# Patient Record
Sex: FEMALE | Race: BLACK OR AFRICAN AMERICAN | NOT HISPANIC OR LATINO | Employment: UNEMPLOYED
[De-identification: names, ages, dates, MRNs, and addresses within clinical notes are randomized per-mention and may not be internally consistent; named-entity substitution may affect disease eponyms.]

---

## 2017-01-04 ENCOUNTER — RECORDS - HEALTHEAST (OUTPATIENT)
Dept: ADMINISTRATIVE | Facility: OTHER | Age: 32
End: 2017-01-04

## 2017-01-26 ENCOUNTER — RECORDS - HEALTHEAST (OUTPATIENT)
Dept: ADMINISTRATIVE | Facility: OTHER | Age: 32
End: 2017-01-26

## 2017-02-03 ENCOUNTER — RECORDS - HEALTHEAST (OUTPATIENT)
Dept: ADMINISTRATIVE | Facility: OTHER | Age: 32
End: 2017-02-03

## 2017-02-10 ENCOUNTER — RECORDS - HEALTHEAST (OUTPATIENT)
Dept: ADMINISTRATIVE | Facility: OTHER | Age: 32
End: 2017-02-10

## 2017-02-20 ENCOUNTER — RECORDS - HEALTHEAST (OUTPATIENT)
Dept: ADMINISTRATIVE | Facility: OTHER | Age: 32
End: 2017-02-20

## 2017-04-06 ENCOUNTER — RECORDS - HEALTHEAST (OUTPATIENT)
Dept: ADMINISTRATIVE | Facility: OTHER | Age: 32
End: 2017-04-06

## 2017-11-14 ENCOUNTER — RECORDS - HEALTHEAST (OUTPATIENT)
Dept: ADMINISTRATIVE | Facility: OTHER | Age: 32
End: 2017-11-14

## 2018-03-08 ENCOUNTER — RECORDS - HEALTHEAST (OUTPATIENT)
Dept: ADMINISTRATIVE | Facility: OTHER | Age: 33
End: 2018-03-08

## 2018-03-15 ENCOUNTER — RECORDS - HEALTHEAST (OUTPATIENT)
Dept: ADMINISTRATIVE | Facility: OTHER | Age: 33
End: 2018-03-15

## 2018-03-22 ENCOUNTER — RECORDS - HEALTHEAST (OUTPATIENT)
Dept: ADMINISTRATIVE | Facility: OTHER | Age: 33
End: 2018-03-22

## 2018-04-19 ENCOUNTER — RECORDS - HEALTHEAST (OUTPATIENT)
Dept: ADMINISTRATIVE | Facility: OTHER | Age: 33
End: 2018-04-19

## 2018-05-30 ENCOUNTER — RECORDS - HEALTHEAST (OUTPATIENT)
Dept: ADMINISTRATIVE | Facility: OTHER | Age: 33
End: 2018-05-30

## 2018-08-20 ENCOUNTER — RECORDS - HEALTHEAST (OUTPATIENT)
Dept: ADMINISTRATIVE | Facility: OTHER | Age: 33
End: 2018-08-20

## 2018-09-06 ENCOUNTER — RECORDS - HEALTHEAST (OUTPATIENT)
Dept: ADMINISTRATIVE | Facility: OTHER | Age: 33
End: 2018-09-06

## 2018-09-20 ENCOUNTER — RECORDS - HEALTHEAST (OUTPATIENT)
Dept: ADMINISTRATIVE | Facility: OTHER | Age: 33
End: 2018-09-20

## 2018-09-26 ENCOUNTER — RECORDS - HEALTHEAST (OUTPATIENT)
Dept: ADMINISTRATIVE | Facility: OTHER | Age: 33
End: 2018-09-26

## 2018-10-11 ENCOUNTER — RECORDS - HEALTHEAST (OUTPATIENT)
Dept: ADMINISTRATIVE | Facility: OTHER | Age: 33
End: 2018-10-11

## 2018-10-18 ENCOUNTER — RECORDS - HEALTHEAST (OUTPATIENT)
Dept: ADMINISTRATIVE | Facility: OTHER | Age: 33
End: 2018-10-18

## 2018-10-22 ENCOUNTER — RECORDS - HEALTHEAST (OUTPATIENT)
Dept: ADMINISTRATIVE | Facility: OTHER | Age: 33
End: 2018-10-22

## 2018-10-26 ENCOUNTER — RECORDS - HEALTHEAST (OUTPATIENT)
Dept: ADMINISTRATIVE | Facility: OTHER | Age: 33
End: 2018-10-26

## 2018-12-04 ENCOUNTER — RECORDS - HEALTHEAST (OUTPATIENT)
Dept: ADMINISTRATIVE | Facility: OTHER | Age: 33
End: 2018-12-04

## 2019-03-06 ENCOUNTER — RECORDS - HEALTHEAST (OUTPATIENT)
Dept: ADMINISTRATIVE | Facility: OTHER | Age: 34
End: 2019-03-06

## 2019-03-20 ENCOUNTER — RECORDS - HEALTHEAST (OUTPATIENT)
Dept: ADMINISTRATIVE | Facility: OTHER | Age: 34
End: 2019-03-20

## 2019-07-22 ENCOUNTER — COMMUNICATION - HEALTHEAST (OUTPATIENT)
Dept: SCHEDULING | Facility: CLINIC | Age: 34
End: 2019-07-22

## 2019-07-22 ENCOUNTER — OFFICE VISIT - HEALTHEAST (OUTPATIENT)
Dept: FAMILY MEDICINE | Facility: CLINIC | Age: 34
End: 2019-07-22

## 2019-07-22 DIAGNOSIS — N95.2 ATROPHIC VAGINITIS: ICD-10-CM

## 2019-07-22 DIAGNOSIS — N89.8 VAGINAL ITCHING: ICD-10-CM

## 2019-07-22 DIAGNOSIS — R05.8 ALLERGIC COUGH: ICD-10-CM

## 2019-07-22 DIAGNOSIS — R05.9 COUGH: ICD-10-CM

## 2019-07-22 DIAGNOSIS — R10.9 RIGHT FLANK PAIN: ICD-10-CM

## 2019-07-22 LAB
ALBUMIN UR-MCNC: NEGATIVE MG/DL
APPEARANCE UR: CLEAR
BACTERIA #/AREA URNS HPF: ABNORMAL HPF
BILIRUB UR QL STRIP: NEGATIVE
CLUE CELLS: NORMAL
COLOR UR AUTO: YELLOW
GLUCOSE UR STRIP-MCNC: NEGATIVE MG/DL
HGB UR QL STRIP: ABNORMAL
KETONES UR STRIP-MCNC: NEGATIVE MG/DL
LEUKOCYTE ESTERASE UR QL STRIP: ABNORMAL
NITRATE UR QL: NEGATIVE
PH UR STRIP: 6.5 [PH] (ref 5–8)
RBC #/AREA URNS AUTO: ABNORMAL HPF
SP GR UR STRIP: 1.01 (ref 1–1.03)
SQUAMOUS #/AREA URNS AUTO: ABNORMAL LPF
TRICHOMONAS, WET PREP: NORMAL
UROBILINOGEN UR STRIP-ACNC: ABNORMAL
WBC #/AREA URNS AUTO: ABNORMAL HPF
YEAST, WET PREP: NORMAL

## 2019-07-22 ASSESSMENT — MIFFLIN-ST. JEOR: SCORE: 1616.68

## 2019-07-23 ENCOUNTER — HOSPITAL ENCOUNTER (OUTPATIENT)
Dept: ULTRASOUND IMAGING | Facility: HOSPITAL | Age: 34
Discharge: HOME | End: 2019-07-23

## 2019-07-23 ENCOUNTER — COMMUNICATION - HEALTHEAST (OUTPATIENT)
Dept: FAMILY MEDICINE | Facility: CLINIC | Age: 34
End: 2019-07-23

## 2019-07-23 DIAGNOSIS — A49.1 STREPTOCOCCUS INFECTION, GROUP B: ICD-10-CM

## 2019-07-23 DIAGNOSIS — R10.9 RIGHT FLANK PAIN: ICD-10-CM

## 2019-07-23 LAB — BACTERIA SPEC CULT: ABNORMAL

## 2019-08-02 ENCOUNTER — COMMUNICATION - HEALTHEAST (OUTPATIENT)
Dept: FAMILY MEDICINE | Facility: CLINIC | Age: 34
End: 2019-08-02

## 2019-08-08 ENCOUNTER — COMMUNICATION - HEALTHEAST (OUTPATIENT)
Dept: FAMILY MEDICINE | Facility: CLINIC | Age: 34
End: 2019-08-08

## 2019-09-27 ENCOUNTER — OFFICE VISIT - HEALTHEAST (OUTPATIENT)
Dept: FAMILY MEDICINE | Facility: CLINIC | Age: 34
End: 2019-09-27

## 2019-09-27 DIAGNOSIS — R05.9 COUGH: ICD-10-CM

## 2019-09-27 DIAGNOSIS — G44.209 TENSION-TYPE HEADACHE, NOT INTRACTABLE, UNSPECIFIED CHRONICITY PATTERN: ICD-10-CM

## 2019-09-27 DIAGNOSIS — R06.2 WHEEZING: ICD-10-CM

## 2019-09-27 DIAGNOSIS — J30.9 ALLERGIC RHINITIS, UNSPECIFIED SEASONALITY, UNSPECIFIED TRIGGER: ICD-10-CM

## 2019-09-27 ASSESSMENT — MIFFLIN-ST. JEOR: SCORE: 1575.86

## 2019-11-03 ENCOUNTER — COMMUNICATION - HEALTHEAST (OUTPATIENT)
Dept: FAMILY MEDICINE | Facility: CLINIC | Age: 34
End: 2019-11-03

## 2019-11-03 DIAGNOSIS — G44.209 TENSION-TYPE HEADACHE, NOT INTRACTABLE, UNSPECIFIED CHRONICITY PATTERN: ICD-10-CM

## 2020-01-15 ENCOUNTER — COMMUNICATION - HEALTHEAST (OUTPATIENT)
Dept: FAMILY MEDICINE | Facility: CLINIC | Age: 35
End: 2020-01-15

## 2020-01-16 ENCOUNTER — OFFICE VISIT - HEALTHEAST (OUTPATIENT)
Dept: FAMILY MEDICINE | Facility: CLINIC | Age: 35
End: 2020-01-16

## 2020-01-16 DIAGNOSIS — N92.6 MISSED MENSES: ICD-10-CM

## 2020-01-16 DIAGNOSIS — Z23 NEED FOR IMMUNIZATION AGAINST INFLUENZA: ICD-10-CM

## 2020-01-16 DIAGNOSIS — Z32.01 PREGNANCY TEST POSITIVE: ICD-10-CM

## 2020-01-16 DIAGNOSIS — O20.0 THREATENED ABORTION IN EARLY PREGNANCY: ICD-10-CM

## 2020-01-16 DIAGNOSIS — O46.8X1 SUBCHORIONIC HEMATOMA IN FIRST TRIMESTER, SINGLE OR UNSPECIFIED FETUS: ICD-10-CM

## 2020-01-16 DIAGNOSIS — O41.8X10 SUBCHORIONIC HEMATOMA IN FIRST TRIMESTER, SINGLE OR UNSPECIFIED FETUS: ICD-10-CM

## 2020-01-16 LAB
AMPHETAMINES UR QL SCN: NORMAL
BARBITURATES UR QL: NORMAL
BASOPHILS # BLD AUTO: 0 THOU/UL (ref 0–0.2)
BASOPHILS NFR BLD AUTO: 1 % (ref 0–2)
BENZODIAZ UR QL: NORMAL
CANNABINOIDS UR QL SCN: NORMAL
COCAINE UR QL: NORMAL
CREAT UR-MCNC: 180.7 MG/DL
EOSINOPHIL # BLD AUTO: 0.3 THOU/UL (ref 0–0.4)
EOSINOPHIL NFR BLD AUTO: 5 % (ref 0–6)
ERYTHROCYTE [DISTWIDTH] IN BLOOD BY AUTOMATED COUNT: 13.7 % (ref 11–14.5)
HCG UR QL: POSITIVE
HCT VFR BLD AUTO: 34.7 % (ref 35–47)
HGB BLD-MCNC: 11.2 G/DL (ref 12–16)
HIV 1+2 AB+HIV1 P24 AG SERPL QL IA: NEGATIVE
LYMPHOCYTES # BLD AUTO: 0.9 THOU/UL (ref 0.8–4.4)
LYMPHOCYTES NFR BLD AUTO: 17 % (ref 20–40)
MCH RBC QN AUTO: 28.7 PG (ref 27–34)
MCHC RBC AUTO-ENTMCNC: 32.3 G/DL (ref 32–36)
MCV RBC AUTO: 89 FL (ref 80–100)
MONOCYTES # BLD AUTO: 0.5 THOU/UL (ref 0–0.9)
MONOCYTES NFR BLD AUTO: 9 % (ref 2–10)
NEUTROPHILS # BLD AUTO: 3.6 THOU/UL (ref 2–7.7)
NEUTROPHILS NFR BLD AUTO: 67 % (ref 50–70)
OPIATES UR QL SCN: NORMAL
OXYCODONE UR QL: NORMAL
PCP UR QL SCN: NORMAL
PLATELET # BLD AUTO: 190 THOU/UL (ref 140–440)
PMV BLD AUTO: 12.5 FL (ref 8.5–12.5)
RBC # BLD AUTO: 3.9 MILL/UL (ref 3.8–5.4)
WBC: 5.4 THOU/UL (ref 4–11)

## 2020-01-16 ASSESSMENT — MIFFLIN-ST. JEOR: SCORE: 1576.99

## 2020-01-17 LAB
ABO/RH(D): NORMAL
ABORH REPEAT: NORMAL
ANTIBODY SCREEN: NEGATIVE
C TRACH DNA SPEC QL PROBE+SIG AMP: NEGATIVE
HBV SURFACE AG SERPL QL IA: NEGATIVE
LEAD BLDV-MCNC: <2 UG/DL (ref 0–4.9)
N GONORRHOEA DNA SPEC QL NAA+PROBE: NEGATIVE
RUBV IGG SERPL QL IA: POSITIVE
T PALLIDUM AB SER QL: NEGATIVE

## 2020-01-18 LAB
COLLECTION METHOD: NORMAL
LEAD BLD-MCNC: NORMAL UG/DL

## 2020-02-04 ENCOUNTER — COMMUNICATION - HEALTHEAST (OUTPATIENT)
Dept: SCHEDULING | Facility: CLINIC | Age: 35
End: 2020-02-04

## 2020-02-05 ENCOUNTER — OFFICE VISIT - HEALTHEAST (OUTPATIENT)
Dept: FAMILY MEDICINE | Facility: CLINIC | Age: 35
End: 2020-02-05

## 2020-02-05 ENCOUNTER — COMMUNICATION - HEALTHEAST (OUTPATIENT)
Dept: FAMILY MEDICINE | Facility: CLINIC | Age: 35
End: 2020-02-05

## 2020-02-05 DIAGNOSIS — O21.0 HYPEREMESIS GRAVIDARUM: ICD-10-CM

## 2020-02-05 DIAGNOSIS — R05.9 COUGH: ICD-10-CM

## 2020-02-05 DIAGNOSIS — Z3A.20 20 WEEKS GESTATION OF PREGNANCY: ICD-10-CM

## 2020-02-05 DIAGNOSIS — G47.00 INSOMNIA, UNSPECIFIED TYPE: ICD-10-CM

## 2020-02-05 LAB
DEPRECATED S PYO AG THROAT QL EIA: NORMAL
FLUAV AG SPEC QL IA: NORMAL
FLUBV AG SPEC QL IA: NORMAL

## 2020-02-05 ASSESSMENT — MIFFLIN-ST. JEOR: SCORE: 1559.97

## 2020-02-06 ENCOUNTER — PRENATAL OFFICE VISIT - HEALTHEAST (OUTPATIENT)
Dept: FAMILY MEDICINE | Facility: CLINIC | Age: 35
End: 2020-02-06

## 2020-02-06 DIAGNOSIS — Z34.90 PREGNANCY, UNSPECIFIED GESTATIONAL AGE: ICD-10-CM

## 2020-02-06 DIAGNOSIS — Z23 NEED FOR IMMUNIZATION AGAINST INFLUENZA: ICD-10-CM

## 2020-02-06 DIAGNOSIS — J98.01 BRONCHOSPASM: ICD-10-CM

## 2020-02-06 DIAGNOSIS — K21.9 GASTROESOPHAGEAL REFLUX DISEASE WITHOUT ESOPHAGITIS: ICD-10-CM

## 2020-02-06 LAB — GROUP A STREP BY PCR: NORMAL

## 2020-02-06 RX ORDER — ALBUTEROL SULFATE 90 UG/1
2 AEROSOL, METERED RESPIRATORY (INHALATION) EVERY 6 HOURS PRN
Qty: 18 G | Refills: 11 | Status: ON HOLD | OUTPATIENT
Start: 2020-02-06 | End: 2021-10-31

## 2020-02-06 ASSESSMENT — MIFFLIN-ST. JEOR: SCORE: 1559.97

## 2020-02-07 ENCOUNTER — HOSPITAL ENCOUNTER (OUTPATIENT)
Dept: ULTRASOUND IMAGING | Facility: HOSPITAL | Age: 35
Discharge: HOME | End: 2020-02-07
Attending: FAMILY MEDICINE

## 2020-02-07 DIAGNOSIS — Z3A.20 20 WEEKS GESTATION OF PREGNANCY: ICD-10-CM

## 2020-02-10 ENCOUNTER — COMMUNICATION - HEALTHEAST (OUTPATIENT)
Dept: FAMILY MEDICINE | Facility: CLINIC | Age: 35
End: 2020-02-10

## 2020-02-17 ENCOUNTER — RECORDS - HEALTHEAST (OUTPATIENT)
Dept: ADMINISTRATIVE | Facility: OTHER | Age: 35
End: 2020-02-17

## 2020-03-02 ENCOUNTER — RECORDS - HEALTHEAST (OUTPATIENT)
Dept: ADMINISTRATIVE | Facility: OTHER | Age: 35
End: 2020-03-02

## 2020-03-05 ENCOUNTER — RECORDS - HEALTHEAST (OUTPATIENT)
Dept: ADMINISTRATIVE | Facility: OTHER | Age: 35
End: 2020-03-05

## 2020-03-10 ENCOUNTER — COMMUNICATION - HEALTHEAST (OUTPATIENT)
Dept: SCHEDULING | Facility: CLINIC | Age: 35
End: 2020-03-10

## 2020-03-16 ENCOUNTER — PRENATAL OFFICE VISIT - HEALTHEAST (OUTPATIENT)
Dept: FAMILY MEDICINE | Facility: CLINIC | Age: 35
End: 2020-03-16

## 2020-03-16 DIAGNOSIS — O21.0 HYPEREMESIS GRAVIDARUM: ICD-10-CM

## 2020-03-16 DIAGNOSIS — O22.02 OBSTETRIC VARICOSE VEINS IN SECOND TRIMESTER: ICD-10-CM

## 2020-03-16 DIAGNOSIS — G47.00 INSOMNIA, UNSPECIFIED TYPE: ICD-10-CM

## 2020-03-16 DIAGNOSIS — Z34.90 PREGNANCY, UNSPECIFIED GESTATIONAL AGE: ICD-10-CM

## 2020-03-16 ASSESSMENT — MIFFLIN-ST. JEOR: SCORE: 1571.31

## 2020-03-23 ENCOUNTER — COMMUNICATION - HEALTHEAST (OUTPATIENT)
Dept: FAMILY MEDICINE | Facility: CLINIC | Age: 35
End: 2020-03-23

## 2020-03-23 DIAGNOSIS — Z34.90 PREGNANCY, UNSPECIFIED GESTATIONAL AGE: ICD-10-CM

## 2020-04-03 ENCOUNTER — COMMUNICATION - HEALTHEAST (OUTPATIENT)
Dept: FAMILY MEDICINE | Facility: CLINIC | Age: 35
End: 2020-04-03

## 2020-04-13 ENCOUNTER — AMBULATORY - HEALTHEAST (OUTPATIENT)
Dept: FAMILY MEDICINE | Facility: CLINIC | Age: 35
End: 2020-04-13

## 2020-04-13 ENCOUNTER — COMMUNICATION - HEALTHEAST (OUTPATIENT)
Dept: FAMILY MEDICINE | Facility: CLINIC | Age: 35
End: 2020-04-13

## 2020-04-13 DIAGNOSIS — Z34.90 PREGNANCY, UNSPECIFIED GESTATIONAL AGE: ICD-10-CM

## 2020-04-14 ENCOUNTER — AMBULATORY - HEALTHEAST (OUTPATIENT)
Dept: LAB | Facility: CLINIC | Age: 35
End: 2020-04-14

## 2020-04-14 ENCOUNTER — PRENATAL OFFICE VISIT - HEALTHEAST (OUTPATIENT)
Dept: FAMILY MEDICINE | Facility: CLINIC | Age: 35
End: 2020-04-14

## 2020-04-14 DIAGNOSIS — O21.0 HYPEREMESIS GRAVIDARUM: ICD-10-CM

## 2020-04-14 DIAGNOSIS — K59.01 SLOW TRANSIT CONSTIPATION: ICD-10-CM

## 2020-04-14 DIAGNOSIS — Z34.90 PREGNANCY, UNSPECIFIED GESTATIONAL AGE: ICD-10-CM

## 2020-04-14 DIAGNOSIS — H10.13 ALLERGIC CONJUNCTIVITIS, BILATERAL: ICD-10-CM

## 2020-04-14 DIAGNOSIS — D50.8 IRON DEFICIENCY ANEMIA SECONDARY TO INADEQUATE DIETARY IRON INTAKE: ICD-10-CM

## 2020-04-14 DIAGNOSIS — G47.00 INSOMNIA, UNSPECIFIED TYPE: ICD-10-CM

## 2020-04-14 LAB
ALBUMIN UR-MCNC: NEGATIVE MG/DL
APPEARANCE UR: ABNORMAL
BACTERIA #/AREA URNS HPF: ABNORMAL HPF
BILIRUB UR QL STRIP: NEGATIVE
COLOR UR AUTO: YELLOW
FASTING STATUS PATIENT QL REPORTED: NO
GLUCOSE 1H P 50 G GLC PO SERPL-MCNC: 124 MG/DL (ref 70–139)
GLUCOSE UR STRIP-MCNC: NEGATIVE MG/DL
HGB BLD-MCNC: 9.5 G/DL (ref 12–16)
HGB UR QL STRIP: NEGATIVE
KETONES UR STRIP-MCNC: ABNORMAL MG/DL
LEUKOCYTE ESTERASE UR QL STRIP: ABNORMAL
MUCOUS THREADS #/AREA URNS LPF: ABNORMAL LPF
NITRATE UR QL: NEGATIVE
PH UR STRIP: 7 [PH] (ref 5–8)
RBC #/AREA URNS AUTO: ABNORMAL HPF
SP GR UR STRIP: 1.01 (ref 1–1.03)
SQUAMOUS #/AREA URNS AUTO: >100 LPF
UROBILINOGEN UR STRIP-ACNC: ABNORMAL
WBC #/AREA URNS AUTO: ABNORMAL HPF

## 2020-04-14 ASSESSMENT — MIFFLIN-ST. JEOR: SCORE: 1576.98

## 2020-04-15 LAB — BACTERIA SPEC CULT: NORMAL

## 2020-04-20 ENCOUNTER — RECORDS - HEALTHEAST (OUTPATIENT)
Dept: ADMINISTRATIVE | Facility: OTHER | Age: 35
End: 2020-04-20

## 2020-04-28 ENCOUNTER — PRENATAL OFFICE VISIT - HEALTHEAST (OUTPATIENT)
Dept: FAMILY MEDICINE | Facility: CLINIC | Age: 35
End: 2020-04-28

## 2020-04-28 DIAGNOSIS — J30.2 SEASONAL ALLERGIC RHINITIS, UNSPECIFIED TRIGGER: ICD-10-CM

## 2020-04-28 DIAGNOSIS — O09.893 SUPERVISION OF OTHER HIGH RISK PREGNANCIES, THIRD TRIMESTER: ICD-10-CM

## 2020-04-28 DIAGNOSIS — O22.00 VARICOSE VEINS DURING PREGNANCY, ANTEPARTUM: ICD-10-CM

## 2020-04-28 ASSESSMENT — MIFFLIN-ST. JEOR: SCORE: 1589.46

## 2020-05-08 ENCOUNTER — COMMUNICATION - HEALTHEAST (OUTPATIENT)
Dept: FAMILY MEDICINE | Facility: CLINIC | Age: 35
End: 2020-05-08

## 2020-05-15 ENCOUNTER — PRENATAL OFFICE VISIT - HEALTHEAST (OUTPATIENT)
Dept: FAMILY MEDICINE | Facility: CLINIC | Age: 35
End: 2020-05-15

## 2020-05-15 DIAGNOSIS — O09.893 SUPERVISION OF OTHER HIGH RISK PREGNANCIES, THIRD TRIMESTER: ICD-10-CM

## 2020-05-15 DIAGNOSIS — O28.3 ABNORMAL FETAL ULTRASOUND: ICD-10-CM

## 2020-05-15 DIAGNOSIS — K21.9 GASTROESOPHAGEAL REFLUX DISEASE WITHOUT ESOPHAGITIS: ICD-10-CM

## 2020-05-15 DIAGNOSIS — D50.8 IRON DEFICIENCY ANEMIA SECONDARY TO INADEQUATE DIETARY IRON INTAKE: ICD-10-CM

## 2020-05-15 LAB
FERRITIN SERPL-MCNC: <2 NG/ML (ref 10–130)
HGB BLD-MCNC: 8.9 G/DL (ref 12–16)
IRON SERPL-MCNC: 30 UG/DL (ref 42–175)

## 2020-05-15 RX ORDER — CALCIUM CARBONATE 500 MG/1
1 TABLET, CHEWABLE ORAL DAILY
Qty: 90 TABLET | Refills: 3 | Status: SHIPPED | OUTPATIENT
Start: 2020-05-15 | End: 2023-04-28

## 2020-05-15 ASSESSMENT — MIFFLIN-ST. JEOR: SCORE: 1603.07

## 2020-05-16 LAB
ALLERGIC TO PENICILLIN: NO
GP B STREP DNA SPEC QL NAA+PROBE: NEGATIVE

## 2020-05-19 ENCOUNTER — AMBULATORY - HEALTHEAST (OUTPATIENT)
Dept: FAMILY MEDICINE | Facility: CLINIC | Age: 35
End: 2020-05-19

## 2020-05-19 DIAGNOSIS — D50.8 IRON DEFICIENCY ANEMIA SECONDARY TO INADEQUATE DIETARY IRON INTAKE: ICD-10-CM

## 2020-05-19 DIAGNOSIS — Z34.90 PREGNANCY, UNSPECIFIED GESTATIONAL AGE: ICD-10-CM

## 2020-05-19 DIAGNOSIS — O21.0 HYPEREMESIS GRAVIDARUM: ICD-10-CM

## 2020-05-21 ENCOUNTER — COMMUNICATION - HEALTHEAST (OUTPATIENT)
Dept: FAMILY MEDICINE | Facility: CLINIC | Age: 35
End: 2020-05-21

## 2020-05-26 ENCOUNTER — OFFICE VISIT - HEALTHEAST (OUTPATIENT)
Dept: FAMILY MEDICINE | Facility: CLINIC | Age: 35
End: 2020-05-26

## 2020-05-26 DIAGNOSIS — O09.893 SUPERVISION OF OTHER HIGH RISK PREGNANCIES, THIRD TRIMESTER: ICD-10-CM

## 2020-05-26 DIAGNOSIS — D50.8 IRON DEFICIENCY ANEMIA SECONDARY TO INADEQUATE DIETARY IRON INTAKE: ICD-10-CM

## 2020-05-26 DIAGNOSIS — Z34.90 PREGNANCY, UNSPECIFIED GESTATIONAL AGE: ICD-10-CM

## 2020-05-27 ENCOUNTER — RECORDS - HEALTHEAST (OUTPATIENT)
Dept: ADMINISTRATIVE | Facility: OTHER | Age: 35
End: 2020-05-27

## 2020-05-27 ENCOUNTER — COMMUNICATION - HEALTHEAST (OUTPATIENT)
Dept: FAMILY MEDICINE | Facility: CLINIC | Age: 35
End: 2020-05-27

## 2020-05-29 ENCOUNTER — INFUSION - HEALTHEAST (OUTPATIENT)
Dept: INFUSION THERAPY | Facility: HOSPITAL | Age: 35
End: 2020-05-29

## 2020-05-29 DIAGNOSIS — D50.8 IRON DEFICIENCY ANEMIA SECONDARY TO INADEQUATE DIETARY IRON INTAKE: ICD-10-CM

## 2020-05-29 DIAGNOSIS — Z34.90 PREGNANCY, UNSPECIFIED GESTATIONAL AGE: ICD-10-CM

## 2020-05-29 DIAGNOSIS — O21.0 HYPEREMESIS GRAVIDARUM: ICD-10-CM

## 2020-06-01 ENCOUNTER — INFUSION - HEALTHEAST (OUTPATIENT)
Dept: INFUSION THERAPY | Facility: HOSPITAL | Age: 35
End: 2020-06-01

## 2020-06-01 DIAGNOSIS — O21.0 HYPEREMESIS GRAVIDARUM: ICD-10-CM

## 2020-06-01 DIAGNOSIS — D50.8 IRON DEFICIENCY ANEMIA SECONDARY TO INADEQUATE DIETARY IRON INTAKE: ICD-10-CM

## 2020-06-01 DIAGNOSIS — Z34.90 PREGNANCY, UNSPECIFIED GESTATIONAL AGE: ICD-10-CM

## 2020-06-03 ENCOUNTER — INFUSION - HEALTHEAST (OUTPATIENT)
Dept: INFUSION THERAPY | Facility: HOSPITAL | Age: 35
End: 2020-06-03

## 2020-06-03 ENCOUNTER — PRENATAL OFFICE VISIT - HEALTHEAST (OUTPATIENT)
Dept: FAMILY MEDICINE | Facility: CLINIC | Age: 35
End: 2020-06-03

## 2020-06-03 DIAGNOSIS — Z34.90 PREGNANCY, UNSPECIFIED GESTATIONAL AGE: ICD-10-CM

## 2020-06-03 DIAGNOSIS — O21.0 HYPEREMESIS GRAVIDARUM: ICD-10-CM

## 2020-06-03 DIAGNOSIS — O09.893 SUPERVISION OF OTHER HIGH RISK PREGNANCIES, THIRD TRIMESTER: ICD-10-CM

## 2020-06-03 DIAGNOSIS — D50.8 IRON DEFICIENCY ANEMIA SECONDARY TO INADEQUATE DIETARY IRON INTAKE: ICD-10-CM

## 2020-06-03 ASSESSMENT — MIFFLIN-ST. JEOR: SCORE: 1591.73

## 2020-06-05 ENCOUNTER — INFUSION - HEALTHEAST (OUTPATIENT)
Dept: INFUSION THERAPY | Facility: HOSPITAL | Age: 35
End: 2020-06-05

## 2020-06-05 DIAGNOSIS — O21.0 HYPEREMESIS GRAVIDARUM: ICD-10-CM

## 2020-06-05 DIAGNOSIS — Z34.90 PREGNANCY, UNSPECIFIED GESTATIONAL AGE: ICD-10-CM

## 2020-06-05 DIAGNOSIS — D50.8 IRON DEFICIENCY ANEMIA SECONDARY TO INADEQUATE DIETARY IRON INTAKE: ICD-10-CM

## 2020-06-08 ENCOUNTER — COMMUNICATION - HEALTHEAST (OUTPATIENT)
Dept: FAMILY MEDICINE | Facility: CLINIC | Age: 35
End: 2020-06-08

## 2020-06-09 ENCOUNTER — INFUSION - HEALTHEAST (OUTPATIENT)
Dept: INFUSION THERAPY | Facility: HOSPITAL | Age: 35
End: 2020-06-09

## 2020-06-09 ENCOUNTER — COMMUNICATION - HEALTHEAST (OUTPATIENT)
Dept: SCHEDULING | Facility: CLINIC | Age: 35
End: 2020-06-09

## 2020-06-09 DIAGNOSIS — Z34.90 PREGNANCY, UNSPECIFIED GESTATIONAL AGE: ICD-10-CM

## 2020-06-09 DIAGNOSIS — D50.8 IRON DEFICIENCY ANEMIA SECONDARY TO INADEQUATE DIETARY IRON INTAKE: ICD-10-CM

## 2020-06-09 DIAGNOSIS — O21.0 HYPEREMESIS GRAVIDARUM: ICD-10-CM

## 2020-06-10 ENCOUNTER — PRENATAL OFFICE VISIT - HEALTHEAST (OUTPATIENT)
Dept: FAMILY MEDICINE | Facility: CLINIC | Age: 35
End: 2020-06-10

## 2020-06-10 DIAGNOSIS — O09.893 SUPERVISION OF OTHER HIGH RISK PREGNANCIES, THIRD TRIMESTER: ICD-10-CM

## 2020-06-10 DIAGNOSIS — D50.8 IRON DEFICIENCY ANEMIA SECONDARY TO INADEQUATE DIETARY IRON INTAKE: ICD-10-CM

## 2020-06-10 DIAGNOSIS — Z34.90 PREGNANCY, UNSPECIFIED GESTATIONAL AGE: ICD-10-CM

## 2020-06-19 ENCOUNTER — COMMUNICATION - HEALTHEAST (OUTPATIENT)
Dept: FAMILY MEDICINE | Facility: CLINIC | Age: 35
End: 2020-06-19

## 2020-06-19 ENCOUNTER — PRENATAL OFFICE VISIT - HEALTHEAST (OUTPATIENT)
Dept: FAMILY MEDICINE | Facility: CLINIC | Age: 35
End: 2020-06-19

## 2020-06-19 DIAGNOSIS — O09.893 SUPERVISION OF OTHER HIGH RISK PREGNANCIES, THIRD TRIMESTER: ICD-10-CM

## 2020-06-19 ASSESSMENT — MIFFLIN-ST. JEOR: SCORE: 1582.65

## 2020-06-24 ENCOUNTER — COMMUNICATION - HEALTHEAST (OUTPATIENT)
Dept: FAMILY MEDICINE | Facility: CLINIC | Age: 35
End: 2020-06-24

## 2020-06-25 ENCOUNTER — PRENATAL OFFICE VISIT - HEALTHEAST (OUTPATIENT)
Dept: FAMILY MEDICINE | Facility: CLINIC | Age: 35
End: 2020-06-25

## 2020-06-25 ENCOUNTER — HOSPITAL ENCOUNTER (OUTPATIENT)
Dept: OBGYN | Facility: HOSPITAL | Age: 35
Discharge: HOME | End: 2020-06-25
Attending: FAMILY MEDICINE | Admitting: FAMILY MEDICINE

## 2020-06-25 ENCOUNTER — HOSPITAL ENCOUNTER (OUTPATIENT)
Dept: ULTRASOUND IMAGING | Facility: HOSPITAL | Age: 35
Discharge: HOME | End: 2020-06-25
Attending: FAMILY MEDICINE

## 2020-06-25 ENCOUNTER — COMMUNICATION - HEALTHEAST (OUTPATIENT)
Dept: FAMILY MEDICINE | Facility: CLINIC | Age: 35
End: 2020-06-25

## 2020-06-25 DIAGNOSIS — O48.0 POST-TERM PREGNANCY, 40-42 WEEKS OF GESTATION: ICD-10-CM

## 2020-06-25 DIAGNOSIS — O09.893 SUPERVISION OF OTHER HIGH RISK PREGNANCIES, THIRD TRIMESTER: ICD-10-CM

## 2020-06-25 ASSESSMENT — MIFFLIN-ST. JEOR
SCORE: 1600.8

## 2020-08-03 ENCOUNTER — COMMUNICATION - HEALTHEAST (OUTPATIENT)
Dept: SCHEDULING | Facility: CLINIC | Age: 35
End: 2020-08-03

## 2020-08-03 DIAGNOSIS — R05.9 COUGH: ICD-10-CM

## 2020-08-03 DIAGNOSIS — Z20.822 EXPOSURE TO COVID-19 VIRUS: ICD-10-CM

## 2020-08-17 ENCOUNTER — COMMUNICATION - HEALTHEAST (OUTPATIENT)
Dept: FAMILY MEDICINE | Facility: CLINIC | Age: 35
End: 2020-08-17

## 2020-10-19 ENCOUNTER — COMMUNICATION - HEALTHEAST (OUTPATIENT)
Dept: FAMILY MEDICINE | Facility: CLINIC | Age: 35
End: 2020-10-19

## 2020-10-19 DIAGNOSIS — J30.9 ALLERGIC RHINITIS, UNSPECIFIED SEASONALITY, UNSPECIFIED TRIGGER: ICD-10-CM

## 2020-10-28 ENCOUNTER — OFFICE VISIT - HEALTHEAST (OUTPATIENT)
Dept: FAMILY MEDICINE | Facility: CLINIC | Age: 35
End: 2020-10-28

## 2020-10-28 DIAGNOSIS — N91.2 AMENORRHEA: ICD-10-CM

## 2020-10-28 DIAGNOSIS — M25.562 PAIN IN BOTH KNEES, UNSPECIFIED CHRONICITY: ICD-10-CM

## 2020-10-28 DIAGNOSIS — H10.13 ALLERGIC CONJUNCTIVITIS, BILATERAL: ICD-10-CM

## 2020-10-28 DIAGNOSIS — M25.561 PAIN IN BOTH KNEES, UNSPECIFIED CHRONICITY: ICD-10-CM

## 2020-10-28 LAB
ERYTHROCYTE [DISTWIDTH] IN BLOOD BY AUTOMATED COUNT: 12 % (ref 11–14.5)
FSH SERPL-ACNC: <3 MIU/ML
HCG UR QL: NEGATIVE
HCT VFR BLD AUTO: 39.2 % (ref 35–47)
HGB BLD-MCNC: 13.1 G/DL (ref 12–16)
MCH RBC QN AUTO: 29.9 PG (ref 27–34)
MCHC RBC AUTO-ENTMCNC: 33.5 G/DL (ref 32–36)
MCV RBC AUTO: 89 FL (ref 80–100)
PLATELET # BLD AUTO: 183 THOU/UL (ref 140–440)
PMV BLD AUTO: 9.1 FL (ref 7–10)
PROLACTIN SERPL-MCNC: 25.2 NG/ML (ref 0–20)
RBC # BLD AUTO: 4.39 MILL/UL (ref 3.8–5.4)
T4 FREE SERPL-MCNC: 0.9 NG/DL (ref 0.7–1.8)
TSH SERPL DL<=0.005 MIU/L-ACNC: 1.75 UIU/ML (ref 0.3–5)
WBC: 5.5 THOU/UL (ref 4–11)

## 2020-10-28 RX ORDER — CARBOXYMETHYLCELLULOSE SODIUM 10 MG/ML
GEL OPHTHALMIC
Qty: 30 ML | Refills: 12 | Status: SHIPPED | COMMUNITY
Start: 2020-10-28 | End: 2022-03-03

## 2020-10-28 ASSESSMENT — MIFFLIN-ST. JEOR: SCORE: 1648.43

## 2020-11-11 ENCOUNTER — COMMUNICATION - HEALTHEAST (OUTPATIENT)
Dept: FAMILY MEDICINE | Facility: CLINIC | Age: 35
End: 2020-11-11

## 2020-11-11 DIAGNOSIS — J30.9 ALLERGIC RHINITIS, UNSPECIFIED SEASONALITY, UNSPECIFIED TRIGGER: ICD-10-CM

## 2020-11-16 RX ORDER — CETIRIZINE HYDROCHLORIDE 5 MG/1
TABLET ORAL
Qty: 30 TABLET | Refills: 11 | Status: SHIPPED | OUTPATIENT
Start: 2020-11-16 | End: 2021-12-03

## 2021-04-29 ENCOUNTER — COMMUNICATION - HEALTHEAST (OUTPATIENT)
Dept: FAMILY MEDICINE | Facility: CLINIC | Age: 36
End: 2021-04-29

## 2021-04-29 ENCOUNTER — AMBULATORY - HEALTHEAST (OUTPATIENT)
Dept: LAB | Facility: CLINIC | Age: 36
End: 2021-04-29

## 2021-04-29 ENCOUNTER — AMBULATORY - HEALTHEAST (OUTPATIENT)
Dept: FAMILY MEDICINE | Facility: CLINIC | Age: 36
End: 2021-04-29

## 2021-04-29 DIAGNOSIS — R11.2 INTRACTABLE VOMITING WITH NAUSEA, UNSPECIFIED VOMITING TYPE: ICD-10-CM

## 2021-04-29 DIAGNOSIS — Z34.90 PREGNANCY, UNSPECIFIED GESTATIONAL AGE: ICD-10-CM

## 2021-04-29 LAB — HCG UR QL: POSITIVE

## 2021-04-29 RX ORDER — PRENATAL VIT/IRON FUM/FOLIC AC 27MG-0.8MG
1 TABLET ORAL DAILY
Qty: 30 TABLET | Refills: 11 | Status: SHIPPED | OUTPATIENT
Start: 2021-04-29 | End: 2021-09-24

## 2021-05-07 ENCOUNTER — AMBULATORY - HEALTHEAST (OUTPATIENT)
Dept: FAMILY MEDICINE | Facility: CLINIC | Age: 36
End: 2021-05-07

## 2021-05-07 DIAGNOSIS — R11.2 INTRACTABLE VOMITING WITH NAUSEA, UNSPECIFIED VOMITING TYPE: ICD-10-CM

## 2021-05-07 RX ORDER — PYRIDOXINE HCL (VITAMIN B6) 50 MG
50 TABLET ORAL 3 TIMES DAILY
Qty: 90 TABLET | Refills: 3 | Status: ON HOLD | OUTPATIENT
Start: 2021-05-07 | End: 2021-11-02

## 2021-05-26 VITALS
SYSTOLIC BLOOD PRESSURE: 98 MMHG | RESPIRATION RATE: 18 BRPM | DIASTOLIC BLOOD PRESSURE: 56 MMHG | TEMPERATURE: 98 F | OXYGEN SATURATION: 98 % | HEART RATE: 96 BPM

## 2021-05-27 VITALS
TEMPERATURE: 97.9 F | HEART RATE: 85 BPM | OXYGEN SATURATION: 99 % | DIASTOLIC BLOOD PRESSURE: 63 MMHG | SYSTOLIC BLOOD PRESSURE: 95 MMHG

## 2021-05-27 VITALS
DIASTOLIC BLOOD PRESSURE: 55 MMHG | SYSTOLIC BLOOD PRESSURE: 98 MMHG | HEART RATE: 86 BPM | TEMPERATURE: 98.1 F | OXYGEN SATURATION: 98 % | RESPIRATION RATE: 18 BRPM

## 2021-05-27 VITALS
DIASTOLIC BLOOD PRESSURE: 56 MMHG | SYSTOLIC BLOOD PRESSURE: 97 MMHG | TEMPERATURE: 98 F | RESPIRATION RATE: 18 BRPM | HEART RATE: 90 BPM

## 2021-05-27 VITALS
DIASTOLIC BLOOD PRESSURE: 59 MMHG | OXYGEN SATURATION: 96 % | HEART RATE: 88 BPM | SYSTOLIC BLOOD PRESSURE: 102 MMHG | TEMPERATURE: 98.8 F | RESPIRATION RATE: 20 BRPM

## 2021-05-28 ENCOUNTER — PRENATAL OFFICE VISIT - HEALTHEAST (OUTPATIENT)
Dept: FAMILY MEDICINE | Facility: CLINIC | Age: 36
End: 2021-05-28

## 2021-05-28 DIAGNOSIS — Z3A.13 13 WEEKS GESTATION OF PREGNANCY: ICD-10-CM

## 2021-05-28 LAB
ALBUMIN UR-MCNC: NEGATIVE G/DL
AMPHETAMINES UR QL SCN: NORMAL
APPEARANCE UR: ABNORMAL
BARBITURATES UR QL: NORMAL
BASOPHILS # BLD AUTO: 0 THOU/UL (ref 0–0.2)
BASOPHILS NFR BLD AUTO: 1 % (ref 0–2)
BENZODIAZ UR QL: NORMAL
BILIRUB UR QL STRIP: NEGATIVE
CANNABINOIDS UR QL SCN: NORMAL
COCAINE UR QL: NORMAL
COLOR UR AUTO: YELLOW
CREAT UR-MCNC: 113.1 MG/DL
EOSINOPHIL # BLD AUTO: 0.3 THOU/UL (ref 0–0.4)
EOSINOPHIL NFR BLD AUTO: 5 % (ref 0–6)
ERYTHROCYTE [DISTWIDTH] IN BLOOD BY AUTOMATED COUNT: 13.4 % (ref 11–14.5)
GLUCOSE UR STRIP-MCNC: NEGATIVE MG/DL
HCT VFR BLD AUTO: 34 % (ref 35–47)
HGB BLD-MCNC: 11.3 G/DL (ref 12–16)
HGB UR QL STRIP: NEGATIVE
HIV 1+2 AB+HIV1 P24 AG SERPL QL IA: NEGATIVE
IMM GRANULOCYTES # BLD: 0.1 THOU/UL
IMM GRANULOCYTES NFR BLD: 1 %
KETONES UR STRIP-MCNC: NEGATIVE MG/DL
LEUKOCYTE ESTERASE UR QL STRIP: ABNORMAL
LYMPHOCYTES # BLD AUTO: 1.1 THOU/UL (ref 0.8–4.4)
LYMPHOCYTES NFR BLD AUTO: 18 % (ref 20–40)
MCH RBC QN AUTO: 29.4 PG (ref 27–34)
MCHC RBC AUTO-ENTMCNC: 33.2 G/DL (ref 32–36)
MCV RBC AUTO: 89 FL (ref 80–100)
MONOCYTES # BLD AUTO: 0.4 THOU/UL (ref 0–0.9)
MONOCYTES NFR BLD AUTO: 6 % (ref 2–10)
NEUTROPHILS # BLD AUTO: 4.2 THOU/UL (ref 2–7.7)
NEUTROPHILS NFR BLD AUTO: 69 % (ref 50–70)
NITRATE UR QL: NEGATIVE
OPIATES UR QL SCN: NORMAL
OXYCODONE UR QL: NORMAL
PCP UR QL SCN: NORMAL
PH UR STRIP: 7 [PH] (ref 5–8)
PLATELET # BLD AUTO: 173 THOU/UL (ref 140–440)
PMV BLD AUTO: 12.5 FL (ref 8.5–12.5)
RBC # BLD AUTO: 3.84 MILL/UL (ref 3.8–5.4)
SP GR UR STRIP: 1.02 (ref 1–1.03)
UROBILINOGEN UR STRIP-ACNC: ABNORMAL
WBC: 6.1 THOU/UL (ref 4–11)

## 2021-05-28 RX ORDER — FERROUS SULFATE 325(65) MG
1 TABLET ORAL DAILY
Qty: 90 TABLET | Refills: 3 | Status: SHIPPED | OUTPATIENT
Start: 2021-05-28 | End: 2021-09-24

## 2021-05-29 LAB
ABO/RH(D): NORMAL
ABORH REPEAT: NORMAL
ANTIBODY SCREEN: NEGATIVE
BACTERIA SPEC CULT: NO GROWTH
HBV SURFACE AG SERPL QL IA: NEGATIVE
T PALLIDUM AB SER QL: NEGATIVE

## 2021-05-30 NOTE — TELEPHONE ENCOUNTER
Spoke with patient, let her know alternative med was sent, she will call pharmacy to check if it is covered.

## 2021-05-30 NOTE — TELEPHONE ENCOUNTER
"Pt reports the medication that was sent today for her cough is not covered by her insurance. Pt states \"everything else is covered\". Pt requests an alternate Rx be sent.    Advised pt message would be sent to prescribing provider. Call back if no response.    Pt verbalizes understanding and agrees to plan.     Reason for Disposition    Caller has NON-URGENT medication question about med that PCP prescribed and triager unable to answer question    Protocols used: MEDICATION QUESTION CALL-A-AH      "

## 2021-05-30 NOTE — PROGRESS NOTES
Labs are normal, ultrasound of abdomen normal, pelvic ultrasound still pending.  Mucinex was sent to your pharmacy.  Follow-up as planned.  Please call results to the patient or send a letter if not reachable by phone.

## 2021-05-30 NOTE — PROGRESS NOTES
Pelvic ultrasound is normal, please call results to the patient or send a letter if not reachable by phone.

## 2021-05-30 NOTE — PROGRESS NOTES
HPI:  Cynthia Abdul is a 33 y.o. female who is seen for   Chief Complaint   Patient presents with     Cough     x 5 day, mucus, pain on the left side when sleeping and urine     Vaginal Itching     x 1 wk, white discharge    Cynthia Abdul is seen as a new patient for cough, which is productive of mucus.  She has a history of allergies, is currently sneezing, congested.  She has not had any medical visits for this current cough, has had no fever, shortness of breath, wheezing.  She is also had 1 week of vaginal itching and white discharge.  She denies history of vaginal infection or yeast infection.  She had a Pap smear in the last year.  She recently moved from [place] to the [place] area.    She also has had left upper quadrant pain without constipation or diarrhea.  She has had some dysuria, and flank pain when laying down at night, this pain is on the right side, she would like to be checked before infection or kidney stones.  ROS: Patient denies fever, chills, sweats, fainting, fatigue, weight change, dizziness, sleep problems, chest pain, palpitations, shortness of breath, wheezing, cough,  sore throat, changes in hearing, ear pain,tinnitus,  disphagia, sore throat, globus, changes in vision, eye pain eye redness, acid reflux, nausea, vomiting, diarrhea, constipation, black or bloody stools,  Dysuria, frequency, urinary incontinence, nocturia, hematuria, back pain,joint pain, bone pain, muscle cramps,edema, weakness, numbness, tingling of extremities, rash, itching, skin changes, swollen lymph nodes, thirst, increased urination, breast lumps, breast pain, memory difficulties, anxiety, mood swings, (female)vaginal discharge, dyspareunia, menorrhagia, pelvic pain, sexual dysfunction,   (male) testicular lumps, erectile dysfunction.    No results found for: HGBA1C  No results found for: GLUF, MICROALBUR, LDLCALC, CREATININE  There is no problem list on file for this patient.    No family history on  file.  Social History     Socioeconomic History     Marital status:      Spouse name: None     Number of children: None     Years of education: None     Highest education level: None   Occupational History     None   Social Needs     Financial resource strain: None     Food insecurity:     Worry: None     Inability: None     Transportation needs:     Medical: None     Non-medical: None   Tobacco Use     Smoking status: Never Smoker     Smokeless tobacco: Never Used   Substance and Sexual Activity     Alcohol use: None     Drug use: None     Sexual activity: None   Lifestyle     Physical activity:     Days per week: None     Minutes per session: None     Stress: None   Relationships     Social connections:     Talks on phone: None     Gets together: None     Attends Rastafari service: None     Active member of club or organization: None     Attends meetings of clubs or organizations: None     Relationship status: None     Intimate partner violence:     Fear of current or ex partner: None     Emotionally abused: None     Physically abused: None     Forced sexual activity: None   Other Topics Concern     None   Social History Narrative     None     History reviewed. No pertinent surgical history.  No current outpatient medications on file prior to visit.     No current facility-administered medications on file prior to visit.      No Known Allergies  OB History    None       I have reviewed the patient's medical history in detail and updated the computerized patient record.  OBJECTIVE:  Wt Readings from Last 3 Encounters:   07/22/19 196 lb (88.9 kg)     Temp Readings from Last 3 Encounters:   07/22/19 98.2  F (36.8  C) (Oral)     BP Readings from Last 3 Encounters:   07/22/19 120/81     Pulse Readings from Last 3 Encounters:   07/22/19 70     Body mass index is 30.7 kg/m .     Alert, cooperative, well-hydrated. Appears well.  Eyes: Pupils equal, round, reactive to light.  HEENT: Sclera white, nares patent, MMM,  TM's pearly bilaterally  Neck: supple, without lymphadenopathy, Thyroid freely movable and without hypotrophy or nodularity.   Lungs: Clear to auscultation. No retractions, no increased work of respiration, equal chest rise.   Heart: Regular rate and rhythm, no murmurs, clicks,   Gallops.  Abdomen: Soft, bowel sounds in 4 quadrants with LUQ tenderness to palpation, no organomegaly or masses, no aortic or renal bruits.  Back: No cervical, thoracic or lumbar tenderness to spinous processes or musculature.  No CVA tenderness bilaterally.  : Normal female, no rashes, blood in the vaginal vault, no visible white exudates.  Cervix is without lesions.    Labs:  No results found for any previous visit.     ASSESMENT/PLAN:  1. Allergic cough  benzonatate (TESSALON PERLES) 100 MG capsule   2. Right flank pain  Urinalysis-UC if Indicated    US Abdomen Complete    US Pelvis Non OB    US Abdomen Complete    Urinalysis-UC if Indicated   3. Atrophic vaginitis     4. Vaginal itching  Wet Prep, Vaginal   Discussed with patient that she could use over-the-counter treatments for yeast infection or we could initially try a single dose of Diflucan.  Patient was unwilling to do these, wanted to have a pelvic exam which was also done along with her other concerns, explained that she would be called with wet prep results.  In the meantime, it appeared after the exam that she had started her period, and I advised her of this.  She will use over-the-counter Mucinex as needed for cough, continue to use Flonase for allergies, Tessalon Perles for the cough as well.    Patient requested MD provider when she was being roomed by the medical assistant.  MD provider was offered at the end of the visit for follow-up and patient states she does not want to see an MD but she wants to continue to see the PA, 2-week follow-up with PA.    Tracey Vazquez, MS, PA-C 07/22/19

## 2021-05-31 LAB
COLLECTION METHOD: NORMAL
LEAD BLD-MCNC: NORMAL UG/DL
LEAD BLDV-MCNC: <2 UG/DL

## 2021-05-31 NOTE — TELEPHONE ENCOUNTER
Test Results  Who is calling?:  patient  Who ordered the test:  Dr. Vazquez  Type of test: Lab and Other: u/s pelvis  Date of test:  7/22 and 7/23  Where [facility] the test performed[facility]  What are your questions/concerns?:  Requesting results, please advise!  Okay to leave a detailed message?:  Yes

## 2021-06-01 LAB — RUBV IGG SERPL QL IA: POSITIVE

## 2021-06-01 NOTE — PROGRESS NOTES
"  Chief Complaint   Patient presents with     Cough     Ongoing cough, comes and goes, thinks it migth be kind of an allergy      Referral         HPI:   Cynthia Abdul is a 34 y.o. female c/o cough for the last couple of months.  She states she has had the cough intermittently over several months  No fever.  Has ongoing itchy eyes with some drainage.  Also has sneezing.  Always has runny nose.    No sore throat.  No ear pain.  Little head congestion.    She was seen in July for the same symptoms.She was treated with tessalon and advised to continue flonase for allergies and over the counter mucinex as needed. She used the flonase but not consistently  She was seen in the ER on 9/22/2019.  Diagnosis allergic rhinitis and switched from claritin to zyrtec.  She has noted no improvement on zyrtec    ROS:  A 10 point comprehensive review of systems was negative except as noted.     Medications:  Current Outpatient Medications on File Prior to Visit   Medication Sig Dispense Refill     [DISCONTINUED] benzonatate (TESSALON PERLES) 100 MG capsule Take 1 capsule (100 mg total) by mouth 3 (three) times a day as needed for cough. 90 capsule 0     [DISCONTINUED] cetirizine (ZYRTEC) 5 MG tablet Take 1 tablet (5 mg total) by mouth daily. 30 tablet 0     [DISCONTINUED] guaiFENesin ER (MUCINEX) 600 mg 12 hr tablet Take 1 tablet (600 mg total) by mouth 2 (two) times a day. 60 tablet 2     No current facility-administered medications on file prior to visit.          Social History:  Social History     Tobacco Use     Smoking status: Never Smoker     Smokeless tobacco: Never Used   Substance Use Topics     Alcohol use: Never     Frequency: Never         Physical Exam:   Vitals:    09/27/19 1514   BP: 96/62   Pulse: 80   Resp: 20   Temp: 97.7  F (36.5  C)   TempSrc: Oral   SpO2: 97%   Weight: 187 lb (84.8 kg)   Height: 5' 7\" (1.702 m)       GENERAL:   Alert. Oriented.  EYES: Clear  HENT:  Ears: R TM pearly gray. Normal landmarks. L TM " pearly gray.  Normal landmarks  Nose: Clear.  Sinuses: Nontender.  Oropharynx:  No erythema. No exudate.  NECK: Supple. No adenopathy.  LUNGS: Clear to ascultation.  No crackles. Soft, wheezes both bases  HEART: RRR  SKIN:  No rash.         Assessment/Plan:    1. Allergic rhinitis, unspecified seasonality, unspecified trigger  fluticasone propionate (FLONASE) 50 mcg/actuation nasal spray    cetirizine (ZYRTEC) 5 MG tablet   2. Cough  benzonatate (TESSALON PERLES) 100 MG capsule   3. Wheezing  albuterol (PROAIR HFA;PROVENTIL HFA;VENTOLIN HFA) 90 mcg/actuation inhaler   4. Tension-type headache, not intractable, unspecified chronicity pattern  ibuprofen (ADVIL,MOTRIN) 400 MG tablet      Description of symptoms is consistent with allergic rhinitis, possibly with some post nasal drainage.  She also has very faint wheezing today on her exam, which is likely contributing to her cough.  She doesn't note a previous history of wheezing/asthma.    Will start fluticasone nasal spray once daily--will help itchy eyes, runny nose also, continue zyrtec once daily and add albuterol for the wheezing three times a day and up to three times prn.  Discussed it may take two weeks of consistent use before she starts to see improvement, and then she will need to continue the medications for several more weeks to get out of the allergy season and get every thing settled down.  May use benzonatate as needed for cough--it won't cure the cough, just suppress some.    She also requested a prescription of ibuprofen for occasional headaches--this was given.    Recheck in 4 weeks or before if problems.        Sharon Neri MD      9/27/2019    The following portions of the patient's history were reviewed and updated as appropriate: allergies, current medications, past family history, past medical history, past social history, past surgical history and problem list.

## 2021-06-02 ENCOUNTER — HOSPITAL ENCOUNTER (OUTPATIENT)
Dept: ULTRASOUND IMAGING | Facility: HOSPITAL | Age: 36
Discharge: HOME | End: 2021-06-02
Attending: FAMILY MEDICINE

## 2021-06-02 DIAGNOSIS — Z3A.13 13 WEEKS GESTATION OF PREGNANCY: ICD-10-CM

## 2021-06-02 NOTE — TELEPHONE ENCOUNTER
RN cannot approve Refill Request    RN can NOT refill this medication med is not covered by policy/route to provider. Last office visit: 9/27/2019 Sharon Neri MD Last Physical: Visit date not found Last MTM visit: Visit date not found Last visit same specialty: 9/27/2019 Sharon Neri MD.  Next visit within 3 mo: Visit date not found  Next physical within 3 mo: Visit date not found      Rowena Conner, [facility] Triage/Med Refill 11/3/2019    Requested Prescriptions   Pending Prescriptions Disp Refills     ibuprofen (ADVIL,MOTRIN) 400 MG tablet [Pharmacy Med Name: IBUPROFEN 400 MG TABLET] 100 tablet 0     Sig: TAKE 1 TABLET BY MOUTH EVERY 6 HOURS AS NEEDED FOR PAIN.       There is no refill protocol information for this order

## 2021-06-03 VITALS
SYSTOLIC BLOOD PRESSURE: 96 MMHG | TEMPERATURE: 97.7 F | BODY MASS INDEX: 29.35 KG/M2 | HEART RATE: 80 BPM | HEIGHT: 67 IN | WEIGHT: 187 LBS | OXYGEN SATURATION: 97 % | DIASTOLIC BLOOD PRESSURE: 62 MMHG | RESPIRATION RATE: 20 BRPM

## 2021-06-03 VITALS — BODY MASS INDEX: 30.76 KG/M2 | HEIGHT: 67 IN | WEIGHT: 196 LBS

## 2021-06-03 NOTE — TELEPHONE ENCOUNTER
Per chart review, patient has not often seen a provider at our clinic. Last saw Dr. Davis in September.

## 2021-06-03 NOTE — TELEPHONE ENCOUNTER
Correction- patient was seen by Dr. Neri. Reviewed chart. She was given ibuprofen #100 on 9/27/19- this was for occasional tension headaches- she should not yet be out of this medication. I have declined the refill request. If she is having severe headaches requiring using ibuprofen multiple times per day, then she should be seen in clinic.    Sabi Davis

## 2021-06-04 VITALS
DIASTOLIC BLOOD PRESSURE: 68 MMHG | TEMPERATURE: 97.9 F | WEIGHT: 180 LBS | HEIGHT: 68 IN | HEART RATE: 102 BPM | SYSTOLIC BLOOD PRESSURE: 102 MMHG | RESPIRATION RATE: 20 BRPM | BODY MASS INDEX: 27.28 KG/M2 | OXYGEN SATURATION: 98 %

## 2021-06-04 VITALS
DIASTOLIC BLOOD PRESSURE: 60 MMHG | BODY MASS INDEX: 28.26 KG/M2 | WEIGHT: 186.5 LBS | HEART RATE: 83 BPM | OXYGEN SATURATION: 98 % | HEIGHT: 68 IN | RESPIRATION RATE: 24 BRPM | TEMPERATURE: 98 F | SYSTOLIC BLOOD PRESSURE: 100 MMHG

## 2021-06-04 VITALS
WEIGHT: 185 LBS | SYSTOLIC BLOOD PRESSURE: 110 MMHG | BODY MASS INDEX: 28.13 KG/M2 | DIASTOLIC BLOOD PRESSURE: 60 MMHG | HEART RATE: 88 BPM

## 2021-06-04 VITALS
RESPIRATION RATE: 20 BRPM | WEIGHT: 189 LBS | SYSTOLIC BLOOD PRESSURE: 112 MMHG | HEIGHT: 68 IN | TEMPERATURE: 97.7 F | HEART RATE: 103 BPM | OXYGEN SATURATION: 98 % | BODY MASS INDEX: 28.64 KG/M2 | DIASTOLIC BLOOD PRESSURE: 62 MMHG

## 2021-06-04 VITALS
OXYGEN SATURATION: 95 % | TEMPERATURE: 97.7 F | RESPIRATION RATE: 24 BRPM | HEIGHT: 68 IN | SYSTOLIC BLOOD PRESSURE: 100 MMHG | DIASTOLIC BLOOD PRESSURE: 60 MMHG | BODY MASS INDEX: 27.28 KG/M2 | WEIGHT: 180 LBS | HEART RATE: 107 BPM

## 2021-06-04 VITALS
WEIGHT: 185 LBS | SYSTOLIC BLOOD PRESSURE: 84 MMHG | DIASTOLIC BLOOD PRESSURE: 56 MMHG | RESPIRATION RATE: 17 BRPM | TEMPERATURE: 98 F | BODY MASS INDEX: 28.04 KG/M2 | HEART RATE: 85 BPM | OXYGEN SATURATION: 98 % | HEIGHT: 68 IN

## 2021-06-04 VITALS
HEIGHT: 68 IN | TEMPERATURE: 97.3 F | BODY MASS INDEX: 28.72 KG/M2 | DIASTOLIC BLOOD PRESSURE: 64 MMHG | OXYGEN SATURATION: 95 % | SYSTOLIC BLOOD PRESSURE: 106 MMHG | WEIGHT: 189.5 LBS | HEART RATE: 82 BPM

## 2021-06-04 VITALS
DIASTOLIC BLOOD PRESSURE: 60 MMHG | RESPIRATION RATE: 24 BRPM | HEART RATE: 88 BPM | WEIGHT: 187 LBS | OXYGEN SATURATION: 96 % | HEIGHT: 68 IN | SYSTOLIC BLOOD PRESSURE: 90 MMHG | TEMPERATURE: 97.9 F | BODY MASS INDEX: 28.34 KG/M2

## 2021-06-04 VITALS
HEIGHT: 67 IN | RESPIRATION RATE: 16 BRPM | TEMPERATURE: 98 F | OXYGEN SATURATION: 99 % | WEIGHT: 187.25 LBS | BODY MASS INDEX: 29.39 KG/M2 | DIASTOLIC BLOOD PRESSURE: 62 MMHG | HEART RATE: 65 BPM | SYSTOLIC BLOOD PRESSURE: 100 MMHG

## 2021-06-04 VITALS — HEIGHT: 68 IN | BODY MASS INDEX: 28.64 KG/M2 | WEIGHT: 189 LBS

## 2021-06-04 VITALS
HEART RATE: 105 BPM | HEIGHT: 68 IN | DIASTOLIC BLOOD PRESSURE: 52 MMHG | OXYGEN SATURATION: 99 % | WEIGHT: 183.75 LBS | TEMPERATURE: 97.3 F | SYSTOLIC BLOOD PRESSURE: 82 MMHG | BODY MASS INDEX: 27.85 KG/M2 | RESPIRATION RATE: 20 BRPM

## 2021-06-04 VITALS
HEART RATE: 92 BPM | WEIGHT: 182.5 LBS | SYSTOLIC BLOOD PRESSURE: 90 MMHG | HEIGHT: 68 IN | OXYGEN SATURATION: 97 % | BODY MASS INDEX: 27.66 KG/M2 | TEMPERATURE: 97.9 F | DIASTOLIC BLOOD PRESSURE: 54 MMHG

## 2021-06-04 VITALS
WEIGHT: 199.5 LBS | HEIGHT: 68 IN | BODY MASS INDEX: 30.23 KG/M2 | HEART RATE: 56 BPM | TEMPERATURE: 97.9 F | DIASTOLIC BLOOD PRESSURE: 62 MMHG | SYSTOLIC BLOOD PRESSURE: 106 MMHG | RESPIRATION RATE: 20 BRPM

## 2021-06-05 NOTE — PROGRESS NOTES
ASSESSMENT/PLAN:    1. Cough  She may have post-viral cough from influenza (dx'd 15 days ago), but it's so severe and has been present for 3 weeks, so I will treat as bronchitis and treat with azithromycin.  Will try robitussin with codeine, as tessalon and albuterol are not giving her any relief.  - Rapid Strep A Screen- Throat Swab  - Influenza A/B Rapid Test- Nasal Swab  - Group A Strep, RNA Direct Detection, Throat  - codeine-guaiFENesin (GUAIFENESIN AC)  mg/5 mL liquid; Take 5-10 mL by mouth every 6 (six) hours as needed for cough.  Dispense: 240 mL; Refill: 0  - azithromycin (ZITHROMAX Z-JANETT) 250 MG tablet; Take 2 tablets (500 mg) on  Day 1,  followed by 1 tablet (250 mg) once daily on Days 2 through 5.  Dispense: 6 tablet; Refill: 0    2. Insomnia, unspecified type  Hopefully this will much improve with cough control.  Ok prn hydroxyzine.  - hydrOXYzine HCl (ATARAX) 25 MG tablet; Take 2 tablets (50 mg total) by mouth at bedtime as needed (unable to sleep).  Dispense: 20 tablet; Refill: 0    3. 20 weeks gestation of pregnancy (EDC 6/19/20 by 11w2d ultrasound)  Had IOB labs, but needs IOB appt.  Ordered fetal anatomy ultrasound today.  When she scheduled today she thought I would be seeing her for prenatal care, but I am not acceptiong pts with her due date because I will be gone on sabbatical at that time.  Will get her in with someone else asap.  - US OB > = 14 Weeks; Future    4. Hyperemesis gravidarum  Patient is down 10 pounds.  Will get her IV fluids through home care. I've ordered 2L D5NS every other day with multivitamin and 4mg Zofran.  - Ambulatory referral to Home Health       Return within 2 weeks (by 2/19/2020) for First OB Visit.     SUBJECTIVE:  Cynthia Abdul is a 34 y.o. pregnant woman here for cough, fever, vomiting, insomnia, fatigue, achiness, generally not feeling well.    She is worried that her symptoms are affecting her pregnancy.  She feels like she cannot sleep at all the last few  weeks.  This is both because of her cough and general difficulties sleeping. Her whole body hurts, she thinks because of the cough, vomiting, and lack of sleep.    She has had multiple office and ER visits recently:  12/1/2019: [facility] ER for vomiting pregnancy.  Received IV fluids and Reglan.  Discharged on doxylamine, pyridoxine, Reglan.  12/8/2019 [facility] ER for vaginal bleeding.  Normal fetal heart tones.  1/16/2020: Clinic for pregnancy confirmation.  Initial OB labs obtained.  1/21/20: [facility] ER for headache and back pain starting the night before.  Diagnosed with influenza B by nasal swab.  Received oral Zofran although they knew she was pregnant, they did not prescribe Tamiflu.    1/23/2020: [facility] ER for nausea and vomiting and upper respiratory infection.  She had a negative chest x-ray and CBC.  1/26/2020: [facility] ER for headache and cough.  She got some IV fluids and metoclopramide.    Today:  She is significantly better than when she was dx'd with influenza. At the time she had a bad headache and back pain.  These sx are improved. The cough is still really bad, though. Unproductive. Albuterol MDI and tessaolon pearles don't help at all and she's not taking them.    Pregnancy & vomiting: Hasn't really had a prenatal visit. Isn't sure her due date. Does feel movement. She is vague about her previous history.  Her other children were delivered in [place].  She has been vomitting for months, worse with prev pregnancies. It sounds like she got IV fluids with previous pregnancy.  She has so far gotten rx's for the following: Unisom, B6, Reglan, Zofran.  She says nothing has helped.  The Unisom didn't help with sleep either. It's not improving.    ROS:  Remainder review of systems is negative unless previously stated         The following portions of the patient's history were reviewed and updated as appropriate: allergies, current medications and problem list  Current Outpatient Medications on  "File Prior to Visit   Medication Sig     fluticasone propionate (FLONASE) 50 mcg/actuation nasal spray 2 sprays into each nostril daily.     ibuprofen (ADVIL,MOTRIN) 400 MG tablet Take 1 tablet (400 mg total) by mouth every 6 (six) hours as needed for pain.     albuterol (PROAIR HFA;PROVENTIL HFA;VENTOLIN HFA) 90 mcg/actuation inhaler Inhale 2 puffs every 6 (six) hours as needed for wheezing. (Patient not taking: Reported on 2/5/2020)     benzonatate (TESSALON PERLES) 100 MG capsule Take 1 capsule (100 mg total) by mouth 3 (three) times a day as needed for cough. (Patient not taking: Reported on 2/5/2020)     cetirizine (ZYRTEC) 5 MG tablet Take 1 tablet (5 mg total) by mouth daily. (Patient not taking: Reported on 2/5/2020)     doxylamine (UNISOM) 25 mg tablet Take 1 tablet (25 mg total) by mouth 3 (three) times a day as needed for nausea. (Patient not taking: Reported on 2/5/2020)     metoclopramide (REGLAN) 10 MG tablet Take 1 tablet (10 mg total) by mouth 3 (three) times a day as needed for nausea. (Patient not taking: Reported on 2/5/2020)     prenat.vits,winsome,min-iron-folic Tab Take 1 tablet by mouth daily. (Patient not taking: Reported on 2/5/2020)     pyridoxine, vitamin B6, (VITAMIN B-6) 25 MG tablet Take 1 tablet (25 mg total) by mouth 3 (three) times a day as needed (Nausea). (Patient not taking: Reported on 2/5/2020)     No current facility-administered medications on file prior to visit.         Social History     Tobacco Use   Smoking Status Never Smoker   Smokeless Tobacco Never Used       OBJECTIVE:  :  /60   Pulse (!) 107   Temp 97.7  F (36.5  C) (Oral)   Resp 24   Ht 5' 8\" (1.727 m)   Wt 180 lb (81.6 kg)   LMP 10/30/2019 (Within Days)   SpO2 95%   BMI 27.37 kg/m      Wt Readings from Last 6 Encounters:   02/05/20 180 lb (81.6 kg)   01/26/20 190 lb (86.2 kg)   01/21/20 190 lb (86.2 kg)   01/16/20 187 lb 4 oz (84.9 kg)   12/08/19 188 lb (85.3 kg)   12/01/19 190 lb (86.2 kg)          Gen: "  A&A, NAD.  Very frequent coughing - can hardly talk withough coughing.  HEENT: Oropharynx pink moist and benign.  Neck:  supple, no sig LAD or thyromegally  CV:  HRRR, no M/R/G  Resp:  CTAB, but exam difficult due to uncontrolled cough.  Abd:  S&NT, no mass.  +FHT's 150's.  Ext:  W&D, no edema

## 2021-06-05 NOTE — TELEPHONE ENCOUNTER
PT wanted to schedule with the Provider that has been here the longest. Scheduling with Dr. Cadena, but just remembered Dr. Cadena is going to be on Sabbatical leave. Maybe Provider can talk to her tomorrow about her options at her Office visit? It was too difficult to talk to her over the phone since she could not stop coughing. Pt was coughing so much we were not able to schedule her IOB.

## 2021-06-05 NOTE — TELEPHONE ENCOUNTER
New Appointment Needed  What is the reason for the visit:    First OB Appt Request  Have you had a pregnancy confirmation appointment at a [facility] primary care clinic?:  Yes 01.16.20  When was your positive home pregnancy test?:   date: not sure    Provider Preference: OB doctor  How soon do you need to be seen?: open    Waitlist offered?: No  Okay to leave a detailed message:  Yes

## 2021-06-05 NOTE — TELEPHONE ENCOUNTER
Please give form to Dr. Castillo once received, as she is seeing patient today (Thursday at 11:30).

## 2021-06-05 NOTE — TELEPHONE ENCOUNTER
Who is calling:  Karishma  Reason for Call:  Is waiting for the faxed order for hyperemisis order form to be sent to her  Please fax to :  [phone]- please call   Date of last appointment with primary care: n/a  Okay to leave a detailed message: Yes

## 2021-06-05 NOTE — TELEPHONE ENCOUNTER
Medication Question or Clarification  Who is calling: [facility]  What medication are you calling about (include dose and sig)?: Zofran  Who prescribed the medication?: Dr Cadena  What is your question/concern?:  1) The home infusion can not give the first dose of Zofran at home if the patient has not received IV Zofran before.  After the first dose is done then patient can have it home infused.    2)Also the patient is having hyperemesis.  The request for orders to treat this are being faxed now to fax #[phone]. Please fax orders back and call nurse to update on when  first dose of Zofran  can be set up at our infusion centers and that form was received.    Requested Pharmacy: [facility]  Okay to leave a detailed message?: Yes [phone]

## 2021-06-05 NOTE — TELEPHONE ENCOUNTER
Called pt back, L/M to call back to schedule. L/M stating that she would need to schedule with either Dr. Cadena, Dr. Castillo, Dr. Robles, or Dr. Davis. She does have to pick one so that we can schedule her. If call center does not feel comfortable scheduling, please send call to [place] so we can do it. Thanks!

## 2021-06-05 NOTE — PROGRESS NOTES
Cynthia Abdul is a 34 y.o. female here for pregnancy confirmation    ASSESSMENT/PLAN:   Cynthia was seen today for pregnancy confirmation.    Diagnoses and all orders for this visit:    Pregnancy test positive  Threatened  early pregnancy  34-year-old -0-0-5 at 17 weeks 6 days by dating ultrasound in ER here for pregnancy test confirmation.  Pregnancy test was positive, fetal heart tones 150 bpm at umbilicus.  All vaginal deliveries, no complications per patient.  She is not sure where she wants to follow-up for pregnancy, did not make a follow-up appointment here for initial OB.  Labs completed today, will call patient with results.  Currently without any leakage of fluid, no vaginal bleeding.  Threatened  on 2019 in the ER.  Subchorionic hemorrhage and on ultrasound.  -     Cancel: US OB < 14 Weeks  -     ABO/RH Typing (OP order)  -     Hepatitis B Surface antigen (HBsAG)  -     HIV Antigen/Antibody Screening [facility]  -     HM1(CBC and Differential)  -     HML Antibody Screen  -     RPR  -     Rubella Immune Status (IgG)  -     Chlamydia/gonorrhoeae, URINE  -     Lead, Blood  -     Drugs of Abuse 1,Urine  -     HM1 (CBC with Diff)    Missed menses  -     Pregnancy, Urine, positive      Return in about 1 week (around 2020).   Patient needs to schedule initial OB appointment    ======================================================    SUBJECTIVE  Cynthia Abdul is a 34 y.o. female here for preganancy confirmation.     . 3 boys, 2 girls. Vaginal deliveries. No complications.  She did not want to discuss the details of her prior pregnancies.  No history of C-sections, no postpartum hemorrhage, no miscarriages.    Unsure LMP at the end of 2019.  She had a threatened  on 2019, seen in the ER.  Ultrasound on 2019 showed single intrauterine gestational sac,'s small subchorionic hemorrhage, gestation at 11 weeks 2 days, EDC 2020.  All previous pregnancies  "delivered in [place], patient is unsure which hospital/clinic.      ROS  Complete 10 point review of systems negative except as noted above in HPI    Reviewed Past Medical History, Medications, Family History and Social History in Epic and up to date with no new changes.    OBJECTIVE  /62   Pulse 65   Temp 98  F (36.7  C) (Oral)   Resp 16   Ht 5' 7\" (1.702 m)   Wt 187 lb 4 oz (84.9 kg)   LMP 10/30/2019 (Within Days)   SpO2 99%   BMI 29.33 kg/m       General: Cooperative, pleasant, in no acute distress  HEENT: PERRL, EOMI.  TM normal bilaterally.  Pharynx normal without erythema.  Tonsils normal without hypertrophy or exudates.  Neck: no lymphadenopathy, no masses  CV: RRR, normal S1/S2, no murmur, rubs, gallops  Resp: No respiratory distress. Clear to auscultation bilaterally. No wheezes, rales, rhonchi  Abd: Nontender, gravid, bowel sounds present.  Fundal height at umbilicus.  Ext: radial/pedal pulses +2 bilaterally  MSK: Normal muscle tone  Neuro: CN II-XII intact  Skin: warm, well perfused. No rashes  Psych: No suicidal or homicidal ideations, no self-harm.  Normal affect.    Fetal heart tones 150 bpm    LABS & IMAGES   Results for orders placed or performed in visit on 01/16/20   Pregnancy, Urine   Result Value Ref Range    Pregnancy Test, Urine Positive (!) Negative       ======================================================  Spent 25 min face to face with patient with more the 50% spent in counseling, reviewing chart, and coordination of care and discussing problems listed above.     Options for treatment and follow-up care were reviewed with the patient. Cynthia Abdul and/or guardian was engaged and actively involved in the decision making process. Cynthia Abdul and/or guardian verbalized understanding of the options discussed and was satisfied with the final plan.      Rohini Purcell MD        "

## 2021-06-05 NOTE — TELEPHONE ENCOUNTER
New Appointment Needed  What is the reason for the visit:    Pregnancy Confirmation Appt Needed  When was the first day of your last menstrual cycle?: unknown  Have you done a home pregnancy test?: Yes: positive.    Provider Preference: Any available  How soon do you need to be seen?: as soon as possible after ER visit in December  Waitlist offered?: No  Okay to leave a detailed message:  Yes     Self

## 2021-06-05 NOTE — TELEPHONE ENCOUNTER
Who is calling:  [facility]   Reason for Call:  A few reasons for this call:    To clarify Zofran is not being refused it just that using this medication for the first time with infusion needs to be at a monitored in an infusion center prior to giving this in home care setting.    Please return call to update on the status of the form.    The pharmacy should to [facility] not [facility].   Date of last appointment with primary care: 1/16/20  Okay to leave a detailed message: Yes

## 2021-06-05 NOTE — TELEPHONE ENCOUNTER
Who is calling:  Patient  Reason for Call:  Called back to schedule - no instructions on who to schedule with - pt states she is sick and needs to be seen soon. She has cough, nasuea, vomiting and weightloss  Date of last appointment with primary care: na  Okay to leave a detailed message: Yes, if message is left please leave scheduling instructions with Doctors name, date and time available.

## 2021-06-05 NOTE — TELEPHONE ENCOUNTER
Form completed by PCP. Copy sent to medical record scanning. Karishma notified. CMT will fax to [phone] now. Fax confirmation page received.

## 2021-06-05 NOTE — PROGRESS NOTES
PRENATAL VISIT   FIRST OBSTETRICAL EXAM - OB    Assessment / Impression     Hyperemesis gravidarum:  With plan to start home infusion every other day.  Use oral zofran as needed.    Normal first prenatal visit at [facility]  Discussed orientation, general information, lifestyle, nutrition, exercise,warning signs, resources, lab testing.    Questions answered.    Plan:        Initial labs drawn.  Prenatal vitamins.  Problem list reviewed and updated.  Role of ultrasound in pregnancy discussed; fetal survey: ordered.  Use albuterol as needed for cough.  Instructions were given on how to do this today.  She did receive albuterol prior to leaving today.  She was noted to have a tight harsh cough though her lungs were clear.  Follow up: No follow-ups on file.      Subjective:    Cynthia Abdul is a 34 y.o.  here today for her First Obstetrical Exam.   Cough x 4 weeks.  She had a negative CXR 1 week ago at [facility].  She was seen there for a headache and coughing.  She was prescrbied guaifenesin and codeine, but has not used it.  She  Coughs more when she lays down at night.  She also coughs more when she talks a lot.    She has had some issues with hyperemesis and severe vomiting.  She has not yet started her home infusions.  She is starting to be able to keep some food and fluids down.  She continues to urinate.  She denies any dysuria.  OB History    Para Term  AB Living   5             SAB TAB Ectopic Multiple Live Births           5      # Outcome Date GA Lbr Librado/2nd Weight Sex Delivery Anes PTL Lv   5 Current            4             3             2             1                 Expected Date of Delivery: Not found.    Past Medical History:   Diagnosis Date     Seasonal allergies      No past surgical history on file.  Social History     Tobacco Use     Smoking status: Never Smoker     Smokeless tobacco: Never Used   Substance Use Topics     Alcohol use: Never      Frequency: Never     Drug use: Not on file     Current Outpatient Medications   Medication Sig Dispense Refill     albuterol (PROAIR HFA;PROVENTIL HFA;VENTOLIN HFA) 90 mcg/actuation inhaler Inhale 2 puffs every 6 (six) hours as needed for wheezing. 18 g 11     azithromycin (ZITHROMAX Z-JANETT) 250 MG tablet Take 2 tablets (500 mg) on  Day 1,  followed by 1 tablet (250 mg) once daily on Days 2 through 5. 6 tablet 0     calcium, as carbonate, (TUMS) 200 mg calcium (500 mg) chewable tablet Chew 1 tablet (200 mg total) daily. 90 tablet 3     codeine-guaiFENesin (GUAIFENESIN AC)  mg/5 mL liquid Take 5-10 mL by mouth every 6 (six) hours as needed for cough. 240 mL 0     fluticasone propionate (FLONASE) 50 mcg/actuation nasal spray 2 sprays into each nostril daily. 16 g 11     hydrOXYzine HCl (ATARAX) 25 MG tablet Take 2 tablets (50 mg total) by mouth at bedtime as needed (unable to sleep). 20 tablet 0     inhalational spacing device (AEROCHAMBER MV) Spcr Use with albuterol inhaler 1 each 0     metoclopramide (REGLAN) 5 MG tablet        ondansetron (ZOFRAN) 4 MG tablet Take 1 tablet (4 mg total) by mouth every 8 (eight) hours as needed for nausea. 30 tablet 1     prenat.vits,winsome,min-iron-folic Tab Take 1 tablet by mouth daily. (Patient not taking: Reported on 2/5/2020) 30 each 0     pyridoxine, vitamin B6, (VITAMIN B-6) 25 MG tablet Take 1 tablet (25 mg total) by mouth 3 (three) times a day as needed (Nausea). (Patient not taking: Reported on 2/5/2020) 30 tablet 0     No current facility-administered medications for this visit.      No Known Allergies          High Risk Behavior: None    Review of Systems  General:  Denies problem  Eyes: Denies problem  Ears/Nose/Throat: Denies problem  Cardiovascular: Denies problem  Respiratory:  Denies problem  Gastrointestinal:  Denies problem, Genitourinary: Denies problem  Musculoskeletal:  Denies problem  Skin: Denies problem  Neurologic: Denies problem  Psychiatric: Denies  "problem  Endocrine: Denies problem  Heme/Lymphatic: Denies problem   Allergic/Immunologic: Denies problem       Objective:   Objective    Vitals:    02/06/20 1148 02/06/20 1253   BP: 102/68    Pulse: 92 (!) 102   Resp: 20    Temp: 97.9  F (36.6  C)    TempSrc: Oral    SpO2:  98%   Weight: 180 lb (81.6 kg)    Height: 5' 8\" (1.727 m)      Physical Exam:  General Appearance: Alert, cooperative, no distress, appears stated age  Head: Normocephalic, without obvious abnormality, atraumatic  Eyes: PERRL, conjunctiva/corneas clear, EOM's intact  Ears: Normal TM's and external ear canals, both ears  Nose: Nares normal, septum midline,mucosa normal, no drainage  Throat: Lips, mucosa, and tongue normal; teeth and gums normal  Neck: Supple, symmetrical, trachea midline, no adenopathy;  thyroid: not enlarged, symmetric, no tenderness/mass/nodules; no carotid bruit or JVD  Back: Symmetric, no curvature, ROM normal, no CVA tenderness  Lungs: Clear to auscultation bilaterally, respirations unlabored  Breasts: No breast masses, tenderness, asymmetry, or nipple discharge.  Heart: Regular rate and rhythm, S1 and S2 normal, no murmur, rub, or gallop, Abdomen: Soft, non-tender, bowel sounds active all four quadrants,  no masses, no organomegaly  Pelvic:Not examined  Extremities: Extremities normal, atraumatic, no cyanosis or edema  Skin: Skin color, texture, turgor normal, no rashes or lesions  Lymph nodes: Cervical, supraclavicular, and axillary nodes normal  Neurologic: Normal     Lab:   Results for orders placed or performed in visit on 02/05/20   Rapid Strep A Screen- Throat Swab   Result Value Ref Range    Rapid Strep A Antigen No Group A Strep detected, presumptive negative No Group A Strep detected, presumptive negative   Influenza A/B Rapid Test- Nasal Swab   Result Value Ref Range    Influenza  A, Rapid Antigen No Influenza A antigen detected No Influenza A antigen detected    Influenza B, Rapid Antigen No Influenza B antigen " detected No Influenza B antigen detected   Group A Strep, RNA Direct Detection, Throat   Result Value Ref Range    Group A Strep by PCR No Group A Strep rRNA detected No Group A Strep rRNA detected

## 2021-06-05 NOTE — TELEPHONE ENCOUNTER
Nurse calling again. CARLIE found the form at the front it was erroneously routed to Dr. Cadena. CARLIE gave to HAI Barillas. CARLIE will fax when done.

## 2021-06-06 NOTE — PROGRESS NOTES
Her vomiting is a little bit improved.  She still vomits daily or every other day . She is only eating once daily .   She is having a bit of juice.    She is urinating well.    Reviewed baby cares including skin to skin, delayed cord clamping, encouraged breastfeeding, vitamin k, hepatitis b vaccine, eye ointment, 24 hour testing.  Reviewed carseat  Continue with infusions for now .   Varicose veins noted.  Compression stockings rx given . When no longer pregnant, will refer to vein specialist.   Will need 1 hour glucose, hgb, uai, tdap at next visit.

## 2021-06-06 NOTE — TELEPHONE ENCOUNTER
Who is requesting the letter? Patient  Why is the letter needed?  I need this for school to excuse me from class. Please let them know I have Hyperemesis gravidarum and will need infusions every other day along with absent days until this is better.  Please send this with high priority as I need this ASAP  How would you like this letter returned? I will come to pick this up  Okay to leave a detailed message? Yes

## 2021-06-07 NOTE — TELEPHONE ENCOUNTER
Her next visit will be in person, then if she remains lower risk, will have her next visit be a virtual visit.    She does need labs at her next visit, as well as an immunization with tdap

## 2021-06-07 NOTE — TELEPHONE ENCOUNTER
She can come in next week for her prenatal visit, which will be at 4 weeks.  . She will need her lab testing and her tdap at that time.  Her next ob visit needs to be a face to face visit.

## 2021-06-07 NOTE — TELEPHONE ENCOUNTER
Please call pt and do asthma evaluation with her over the phone.  Then send me the results.  Thank you

## 2021-06-07 NOTE — PROGRESS NOTES
"Subjective:     Cynthia Abdul is a 34 y.o.  at 32w4d who is seen for routine prenatal care.    HPI:   Doing well.  Does have a little bit of sneezing and itchy nose.  Has had allergies at this time of year in the past.  Is not otherwise sick with cough, sore throat, fever, shortness of breath, etc. has fluticasone nasal spray but is not using it.  Wants to let me know if symptoms in case coronavirus is a possibility.  Endorses normal fetal movement.  Denies new headache, abdominal pain, nausea, contractions, lower extremity edema.    She had gotten a prescription for compression stockings for her varicose veins in the past, but lost the prescription.    The following portions of the patient's history were reviewed and updated as appropriate: allergies, current medications, and problem list     Objective     Vitals: /60   Pulse 83   Temp 98  F (36.7  C) (Oral)   Resp 24   Ht 5' 8\" (1.727 m)   Wt 186 lb 8 oz (84.6 kg)   LMP 10/30/2019 (Within Days)   SpO2 98%   BMI 28.36 kg/m     Exam: Per flowsheet  Many significant varicose veins present in the right foot and ankle    Assessment/Plan:     34 y.o.  at 33w1d    1. Supervision of other high risk pregnancies, third trimester  Generally doing well.  Discussed contraception; is not sure that she would want to use anything and definitely would not want anything permanent.    2. Varicose veins during pregnancy, antepartum  I reprinted prescription for compression hose for her    3. Seasonal allergic rhinitis, unspecified trigger  Reassured her that her symptoms are extremely unlikely to be due to coronavirus.  Symptoms are pretty mild and I do not recommend particularly using any new medications if avoidable during pregnancy.  Okay to use allergy eyedrops and fluticasone nasal spray as she has in the past.       Return in about 3 weeks (around 2020) for Prenatal Care. Will need GBS.  "

## 2021-06-07 NOTE — PROGRESS NOTES
"SUBJECTIVE:  Cynthia Abdul is a 34 y.o. female  at 30w4d by 11 week u/s. Estimated Date of Delivery: 20.   She is doing well. She denies vaginal bleeding or fluid leaking. Fetal movement is present. She is not having contractions.   Concerns:   -She is not throwing up as often as previously (not every day), but sometimes she throws up 2 or 3 times in one day. She has a hard time eating much. She takes reglan about once per day, not more. Does not take Zofran because she doesn't like taking so many pills.   -She would like to start a pill for constipation.   -Would like eye drops. Eyes itch due to seasonal allergies.  -Needs a refill on hydroxyzine for sleep.   OBJECTIVE:  Blood pressure (!) 82/52, pulse (!) 105, temperature 97.3  F (36.3  C), temperature source Oral, resp. rate 20, height 5' 8\" (1.727 m), weight 183 lb 12 oz (83.3 kg), last menstrual period 10/30/2019, SpO2 99 %.  Comfortable, no acute distress.  See OB Vitals flowsheet.  ASSESSMENT/PLAN:  Cynthia was seen today for routine prenatal visit and eye problem.    Diagnoses and all orders for this visit:    Hyperemesis gravidarum: This is better than it was earlier in pregnancy but she still has quite severe nausea and vomiting, especially for the 3rd trimester. Furthermore, she is still down 3 lbs from her pre-pregnancy weight. Pre-pregnancy BMI was 28, so goal weight gain would be 15-25 lbs. I explained that she should start taking reglan three times daily to see if she can control her nausea better and eat and drink more. We will also check a growth u/s to make sure the baby is growing well. Fundal height is normal today. Will schedule with [facility] because she did not want to go to [facility] given the COVID pandemic.   -     metoclopramide (REGLAN) 5 MG tablet; Take 1 tablet (5 mg total) by mouth 3 (three) times a day as needed for nausea.  -     Culture, Urine  -     Ambulatory referral to Obstetrics / Gynecology    Pregnancy, " unspecified gestational age  -     Glucose,Gestational Challenge (1 Hour)  -     Hemoglobin  -     Ambulatory referral to Obstetrics / Gynecology    Insomnia, unspecified type  -     hydrOXYzine HCL (ATARAX) 25 MG tablet; Take 2 tablets (50 mg total) by mouth at bedtime as needed (unable to sleep).    Allergic conjunctivitis, bilateral  -     ketotifen (ZADITOR/ZYRTEC ITCHY EYES) 0.025 % (0.035 %) ophthalmic solution; Apply 1 drop to each eye daily.    Slow transit constipation: declines Miralax.   -     senna (SENOKOT) 8.6 mg tablet; Take 1 tablet by mouth daily as needed for constipation.    Iron deficiency anemia secondary to inadequate dietary iron intake: hgb Is 9.5. Will start oral iron. Discussed this another reason why we need to try to control her nausea better, so she can eat more iron-rich foods. Discussed options of these.   -     ferrous sulfate 325 (65 FE) MG tablet; Take 1 tablet (325 mg total) by mouth daily with breakfast.  -     Iron  -     Ferritin    Other orders  -     Tdap vaccine,  6yo or older,  IM      -IUP at 30w4d:   Prenatal labs reviewed and Normal. Also passed 1 hr gtt today  Fetal survey was done at 20 weeks and was normal with EFW in 46th percentile.   Breast/Bottle: breast. History of poor milk supply per chart review; should have early lactation support.    RTC in 2 weeks for in-person visit with Dr. Cadena in order to check weight and hyperemesis or sooner with problems.      Tiffany Robles MD

## 2021-06-07 NOTE — TELEPHONE ENCOUNTER
Called pt to relay pt NTBS next week due to lab work needed.  And then again the following month.  Openings with Dr. Robles include 4/14/20  At 940, 10, 1120, and 1140 am.  Will go ahead and cancel apt on Friday with Dr. Russo.  Thanks.

## 2021-06-07 NOTE — PATIENT INSTRUCTIONS - HE
Patient Education   HEALTHY PREGNANCY CARE: 30-34 WEEKS PREGNANT    You have made it to the final months of your pregnancy. By now, your baby is starting to fill out with some fat under his skin, fuzzy hair on his shoulders, and is gaining 4 to 6 ounces per week.    Discuss any travel plans with your midwife or physician.    Review possible changes in sexuality during later pregnancy and discuss these with your midwife or physician, as well as your partner. Alternative love-making positions may be more comfortable.    Discuss labor and delivery issues with your midwife or physician. If you had a  birth in the past, discuss a trial of labor with your midwife or physician. He or she may ask that you sign a consent form, if you wish to have a vaginal birth after  (). Ask your midwife or physician to explain your options for managing pain during your labor and delivery. Sometimes, during the birth process, an episiotomy may need to be cut in the vagina to make the opening bigger or let the baby come out quicker. You may want to discuss the episiotomy and how often it is needed with your midwife or physician.    Plan for your baby's care by selecting a child health care provider (Family physician, Pediatrician, or Pediatric Nurse Practitioner). Practice installing an infant car seat correctly in the car. Ask for car seat information as needed and make sure it is safe and will work in the car your baby will ride in. You will need a car seat to bring your baby home from the hospital. Check the procedure for adding your baby to your health care plan. Review your decision about circumcision and ask for any information you need. As you buy and receive items for your baby, don't put a baby walker on your list. Walkers can be dangerous and can cause serious injury to your child. A safer option is a saucer-type play station, since it doesn't allow baby to travel across the floor.    Discuss your choices and  plans for birth control with your midwife or physician. Women who are breastfeeding can still become pregnant. Use a birth control method if you want to lower your pregnancy risk. Talk to your midwife or physician if you are considering permanent birth control, such as tubal ligation or Essure. You may need to complete a consent form 30 days prior to delivery in order to have this done after you deliver.    Continue to watch for signs and symptoms of preeclampsia:     Sudden swelling of your face, hands, or feet     New vision problems such as blurring, double vision, or flashing lights    A severe headache not relieved with acetaminophen (Tylenol)    Sharp or stabbing pain in your right or middle upper abdomen    Watch for signs and symptoms of premature labor:     Regular contractions. This means having about 6 or more within 1 hour, even after you have had a glass of water and are resting.     A backache that starts and stops regularly.    An increase or change in vaginal discharge, such as heavy, mucus-like, watery, or bloody discharge.     Your water breaks or leaks.    If you have any of the above symptoms or any other concerns, call your provider or their clinic staff at [facility] MEDICINE/OB  at Phone: [phone]. If it's after clinic hours, physician patients should call the [facility] at [phone] ([phone]); midwife patients should call their answering service at [phone].    How can you care for yourself at home?   You can refer to the Starting Out Right book or find it online at [email or web address]-[email or web address]-[email or web address] or http://[email or web address]/stories/[email or web address]-starting-out-[email or web address]-starting-out-right.html#p=[email or web address]     You can sign up for a weekly parenting e-mail that gives support, tips and advice from health care professionals that starts with pregnancy and continues through the toddler years. To register, go to  [email or web address] at any time during your pregnancy.

## 2021-06-07 NOTE — TELEPHONE ENCOUNTER
"So sorry but CMT needs clarification.  Per last OB visit with PCP on 3/17/20, pt was to RTC on 4/13/20.  PCP is not on that week.  Per OB notes from 3/17 pt  \"Will need 1 hour glucose, hgb, uai, tdap at next visit.\"    Should visit this Friday be a F2F with Dr. Russo and then as PCP said, the next one be virtual since labs need to be done?  OR can labs wait until next virtual visit next month?  Thanks   "

## 2021-06-07 NOTE — TELEPHONE ENCOUNTER
Patient was notifed and she will call back to schedule appt with Dr Robles next week. I explained she only had a few open but she declined to schedule at this time.   When patient calls back please assist her in scheduling a Face to Face visit with a Female OB provider

## 2021-06-07 NOTE — TELEPHONE ENCOUNTER
Pharmacy is contacting as patient has multiple rescue inhaler fills without filling a controller medication at [facility] in the last 180 days.    [facility] is reaching out on behalf of the patient to determine if it is appropriate to start a daily asthma controller therapy.    Thank you.

## 2021-06-07 NOTE — TELEPHONE ENCOUNTER
CC called about pt apt as unable to schedule. Went back through this thread and scheduled pt with Dr. Robles next Tuesday at 1140 am.  At 1015 am pt will come in for 1 hr GTT, hbg, urine.     Called pt who understands and verifies. Thanks.

## 2021-06-07 NOTE — TELEPHONE ENCOUNTER
Called patient to do asthma evalutation.  Pt stated that she does NOT have asthma.  She was given the inhaler in the past for cough, so there is no need for an inhaler.    Patient would like a PNV sent to pharmacy.  Please not on prescription that she needs a PNV that does NOT contain gelatin (which contains pork).      Thank you.

## 2021-06-07 NOTE — TELEPHONE ENCOUNTER
Upcoming Appointment Question  When is the appointment: 04/10  What is your appointment for?: routine ob and 1hr glucose test  Who is your appointment scheduled with?: Dr. Russo  What is your question/concern?: Patient is concerned with COVID and is wondering if it's safe for her to come in. She's wondering if she can reschedule the appt for next month.   Okay to leave a detailed message?: Yes

## 2021-06-08 NOTE — TELEPHONE ENCOUNTER
FYI - Status Update  Who is Calling: Home Care  Update: Patient is declining home infusion treatments so we are discharging her.   Okay to leave a detailed message?:  Yes

## 2021-06-08 NOTE — PROGRESS NOTES
cynthia arrived A&Ox4 ambulatory and stable, confirms she is here for her first venofer infusion. POC/medication education reviewed,pt stated understanding and agreed to plan  PIV w ease L FA, excellent blood return, line easily flushed NS  venofer 200mg given as ordered; line flushed with NS until clear . Pt was monitored 30+ min post infusion, VSS and no sxs adverse Rxn  PIV dc'd and site covered with gauze/coban. Dc education/AVS reviewed, pt stated understanding and that her needs were met today  1530 Cynthia dc;d A&Ox4 ambulatory and stable, will return Monday for next infusion

## 2021-06-08 NOTE — PROGRESS NOTES
She is doing well.  Denies any bleeding, cramping ,or lof.  Baby is moving well.    Denies any headaches or vision changes.    Denies any lower extremity edema.    Denies any ruq pain.  Denies itching. .  HC at last US was small.  Will repeat US with [facility] for ECV.    She is eating well.  She is taking her pnv, but not all the time.  She rarely takes her iron due to heart burn.  She is out of tums.    Will do iron studies today, if low will get set up for IV iron.  Recommended to take her pnv and iron.  Reviewed low blood supply.       She needs a maternity belt.  She is starting to have a lot of pain with walking .   Baby is breech, will refer [facility] for possible ECV.  Reviewed risks of breech delivery and recommendation for  if remains breech .

## 2021-06-08 NOTE — TELEPHONE ENCOUNTER
Last office visit: 2020  Last refill: N/A   Last OB check: US OB>=14 WEEKS 05/15/2020  Next appointment: 2020 INFUSION THERAPY     Chart reviewed. Please review findings below.     Assessment/Plan:  1. Iron deficiency anemia secondary to inadequate dietary iron intake  Reviewed the iron infusions, and high iron diet.  Reviewed trying to avoid blood transfusion.    I did encourage her to continue with her prenatal vitamin and her oral iron.  She had some questions about differentiating this from her docusate sodium which she said look very similar.  She also had some questions about why her hemoglobin was so low at this time.  I did review her ferritin and iron levels as well as her dropping hemoglobin.  We did review that if she has significant bleeding following the delivery that she may still require a blood transfusion but that the goal of these iron infusions was to increase her hemoglobin to the point where that would not be necessary.     2. Pregnancy, unspecified gestational age        3. Supervision of other high risk pregnancies, third trimester  Reviewed when to go to the hospital  [facility] for [facility] for ECV.    Reviewed an ECV, risks and benefits.    Reviewed negative GBS.    Reviewed that a  would be recommended if this fails.       4.  Breech position  I will have her be seen in 1 week in person to recheck the position of the baby.

## 2021-06-08 NOTE — PROGRESS NOTES
Cynthia Abdul arrived for dose three Iron Sucrose IV push. Cynthia Abdul was educated on her plan of care. Each medication given today was reviewed prior to administration. Iron Sucrose 200 mg IV push with NS infusing and 30 minute post treatment observation period were completed with no signs of reaction. IV site:peripheral IV patent throughout treatment with positive blood return obtained before and at completion. IV site with no pain, redness, swelling and drainage. IV site flushed with saline and discontinued. Cynthia Abdul has follow-up appointment scheduled on 06/05/200 for dose four. Cynthia Abdul was discharged to another medical appointment. She discharged from unit ambulatory and independent at 1334.    Leonor Rudd

## 2021-06-08 NOTE — TELEPHONE ENCOUNTER
Medication Question or Clarification  Who is calling: Pharmacist  What medication are you calling about (include dose and sig)?: The IV infusion D5 and the Multivitamin   Who prescribed the medication?: Faith Castillo MD    What is your question/concern?: Patient reports she is no longer using these and request they be discontinued.   Requested Pharmacy: [facility]  Okay to leave a detailed message?: Yes

## 2021-06-08 NOTE — PROGRESS NOTES
Cynthia Abdul arrived for dose 5 of 5 Iron Sucrose IV push. Cynthia Abdul was educated on her plan of care. Each medication given today was reviewed prior to administration. Iron Sucrose 200 mg IV push with normal saline infusion treatment and thirty minute post infusion observation period were completed with no signs of reaction. IV site:peripheral IV patent throughout treatment with positive blood return obtained before and at completion. IV site with no pain, redness, swelling and drainage. IV site flushed with saline and discontinued. Cynthia Abdul has follow-up appointment scheduled tomorrow with her provider. Cynthia Abdul was discharged to home. She discharged from unit ambulatory and independent in stable condition at 1518. The after visit summary was declined.     Leonor Rudd

## 2021-06-08 NOTE — PATIENT INSTRUCTIONS - HE
Patient Education   HEALTHY PREGNANCY CARE: 37 to 41 WEEKS PREGNANT    Talk with your midwife or physician about when to call with signs of labor    Regular uterine contractions that are getting closer together and/or stronger    If you think your water has broken or is leaking    Bleeding from the vagina like a period (bloody vaginal discharge is normal)    If you are not feeling your baby move    Make plans for transportation and  as needed for when you are going to the hospital.    Your midwife or physician may offer to check your cervix for changes.     Ask your health care provider about vaccinations you may need following delivery. By now, you should have received a Tdap immunization to protect against pertussis or whooping cough. Fathers and family members who will be in close contact with the baby should also receive a Tdap shot at least two weeks before the expected birth of the baby if they have not had a Td (tetanus) shot for at least two years.    If you are past your due date, discuss the next steps leading to delivery with your midwife or physician. If you don't start labor on your own by 41 or 42 weeks, your midwife or physician may recommend giving you medicines to ripen your cervix and start labor.    Preparing for your baby: Tell your midwife or physician how you plan to feed your baby (breast or bottle), who you have chosen to do pediatric care for your baby, and if you have a boy, whether you have chosen to have him circumcised. You will need a car seat correctly installed in your vehicle to bring your baby home. As you start to set up the nursery at home for your baby, make sure the crib is safe. The mattress needs to fit snugly against the edges of the crib. If you can fit a soda can between the bars, they are too far apart and can allow the baby's head to caught between them.    Learn about infant care and feeding, including information about infant CPR. We recommend that you put  your baby to sleep on his or her back to reduce the chance of Sudden Infant Death Syndrome (SIDS). To maintain a healthy environment in which your child can grow, it's best to keep your home smoke-free. By preparing ahead, your transition into parenthood will go smoothly for you and your baby.    Your midwife or physician will want to see you for a checkup 2 to 6 weeks after delivery.    If you have questions about any symptoms you are experiencing or any other concerns, call your provider or their clinic staff at [facility] MEDICINE/OB  at Phone: [phone]. If it's after clinic hours, physician patients should call the Care Connection at [phone] ([phone]); midwife patients should call their answering service at [phone].    How can you care for yourself at home?   You can refer to the Starting Out Right book or find it online at [email or web address]/[email or web address]/[email or web address]-[email or web address] or [email or web address]/images/[email or web address]/[email or web address]-out-[email or web address]-starting-out-right.html#p=8     You can sign up for a weekly parenting e-mail that gives support, tips and advice from health care professionals that starts with pregnancy and continues through the toddler years. To register, go to [email or web address] at any time during your pregnancy.    Making Early Breastfeeding or Chestfeeding Work: What's Important?  Breastfeeding/chestfeeding is important!     It helps keep babies healthy.    Parents who breastfeed/chestfeed have lower risks of breast and ovarian cancer.    It's convenient: the milk is always ready and warm, and there is nothing to mix or prepare for feeding.    Formula is harder for your baby to digest.    It helps you bond with your baby and protects against postpartum depression.  Lots of early skin-to-skin contact with your baby    Place your naked baby with baby's belly against your bare chest. Cover baby's back with a blanket    Start  "skin-to-skin right after birth, as soon as you are ready    Skin-to-skin:  ? Helps keep baby warm  ? Improves baby's oxygen and blood sugar levels  ? Helps your uterus contract and bleed less  ? Helps baby feel calm and comforted  ? Helps you feel close to baby  ? Helps get breastfeeding started. Being close makes latching on easier, and baby may move over to the nipple and latch without help  ? Baby breastfeeds better and longer when skin-to-skin  Placing baby well for good attachment to the breast or chest    Hold your baby close with baby's tummy touching your tummy.    Wait for baby to open mouth wide, then bring baby onto the breast/chest.    Baby should take a big mouthful of breast/chest, not just the nipple. This helps baby get more milk, and the suckling should feel comfortable.    When baby is latched well to the breast/chest, nipples aren't cracked or painful.  Keeping baby near (called \"rooming-in\" at the hospital)    Baby sleeps better and cries less when birth parent is near. Your room is quiet.    We will place your baby safely on their back in their bassinette. This lets you practice safe sleep for your baby while keeping them at your bedside.    Baby feeds more often, which means your milk supply increases faster, and your baby loses less weight.    Parents have an easier time getting to know and bonding with baby.    Parents feel much more confident about baby care and breastfeeding/chestfeeding.    Maternity staff can help at any time.  Feeding on cue    Feeding on cue simply means feeding whenever your baby shows signs of hunger    Crying is a late hunger sign. Feed baby whenever baby wants for as long as baby wants.    Feeding signs: mouth movements, sticking the tongue out, rooting (baby turns toward chest and may open mouth), hand-to-mouth movements    Advantages of feeding on cue:  ? Frequent breastfeeding/chestfeeding in the first weeks after birth gives you a good milk supply for months to " "come.  ? Babies settle into a relaxing feed more quickly. Babies enjoy feedings more when they don't have to cry to be fed.  ? Feeding is comfort as well as nutrition. Newborns love constant closeness and feeding and can't be held \"too much\" or \"spoiled.\"  ? Newborns need small frequent feedings in the first days of life. Just one to three teaspoons fill a new baby's stomach.  ? Responding to feeding cues helps babies gain weight.  Feeding only human milk in the first six months    Colostrum is the first milk that baby gets at birth. The amount of colostrum matches the baby's stomach, so it will not be overfilled.    The small volumes ready at birth are also easier for baby to handle.    All babies lose weight in the first few days. This is simply \"water weight.\"    Introducing food or fluids other than human milk too early can cause problems for breastfeeding/chestfeeding and for your baby's health.    Feeding only human milk maximizes the protection against disease and infections.    Your body knows how much milk to make by how often your baby feeds. If you give your baby formula, your body may not know how much milk to make.    For informational purposes only. Not to replace the advice of your health care provider. Adapted with permission from \"Getting Started with Infant Care and Breastfeeding,\" by HEATHER Hendrix, ALESHA, Dorinda Mejias, RN, IBCLC, Livia Case MD, ALESHA, and Avis Nevarez MD, IBCLC. [place] [employer], 2017. Clinically reviewed by [facility]. [employer] 232238 - 05/19.        [facility]feeding Resources     Research shows that human milk offers the best  nutrition and protection for babies. So at [facility],  we care for families and babies in a way that  promotes, teaches and supports lactation. We  support all breastfeeding, chestfeeding and human  milk feeding families, as well as families who can t  breastfeed / chestfeed or who choose not to do so.  A " mother or caregiver s own milk is best for a baby,  but if you are unable to breastfeed / chestfeed, we  may suggest pasteurized donor human milk for your  baby while in the hospital.    Lactation support    The following [facility] locations offer  individual outpatient lactation consults. Some  locations offer phone or group lactation consults  with certified lactation consultants. Call to confirm  services and for information and appointments. You  may wish to call your insurance company first to see  if they will cover the cost of a consult.    [facility]: [phone]    [facility]: [phone]    [facility]: [phone]  Offers [facility] First Days program, which includes  group lactation visits and one free information session  about delivering your baby at a designated [facility] hospital and the importance and benefits  of breastfeeding and human milk. These group visits  also help patients with feeding concerns and offer  information about other postpartum topics.                For informational purposes only. Not to replace the advice of your health care provider. Copyright   2005 [facility]. All rights reserved. [employer] 354528 - REV          [facility] ([place]):  [phone]    [facility] ([place]):  [phone] or [phone]    [facility]):  [phone]    [facility]  ([place]): [phone]    [facility] ([place]):  [phone] or [phone]  Includes follow-up visits for caregivers of babies  discharged from the  intensive care unit  (NICU) at [facility].    [facility]):  [phone]    [facility] ([facility],  [facility], primary care clinics):  [phone]  Also offers weight checks, feeding  discussions and support with a lactation consultant as a part of free, weekly support group.    [facility]: [phone]  Also offers a free weekly support group.                                                                                                                                                                                                      (continued)   You may also call [facility] at [phone]  for information about [facility] and [facility]  locations that offer lactation support. For information  about breastfeeding / chestfeeding and childbirth  classes in the [facility], go to [facility] at www.[email or web address]. For [facility], go to [email or web address] and click on  Classes and Events at the bottom of the page. Or, call  [phone].    Supplies    You can get breast pumps from the [facility] nurses  at [facility] or [facility]  nurses at [facility]. Call your health  insurance to see if they will cover the cost of the  pump. Tell your nurse you d like a pump. They will  help you fill out the right paperwork. The pump will  be ready for you when you leave the hospital.    If you decide to get a pump after you leave the  hospital, you can get one from our partners at  [facility]. [facility] carries a range of  feeding supplies and pumps. Please call your health  insurance to see if they will cover the cost of the  pump. Then call [facility]  to find out what supplies and pumps are available.  Some stores may deliver the pump to your home.    [facility]:  [email or web address] Other lactation services    [facility]: [phone] (24 hours a day)  [email or web address]  Offers support for breastfeeding / chestfeeding and  human milk feeding families. Call to find a group in  your area.    National Women s Health Information  Center:  [phone]  www.womenshealth.gov/breastfeeding  Offers a breastfeeding / chestfeeding information  line in English and English.    WIC (Women, Infants and Children) Program:  [phone]  Offers breastfeeding / chestfeeding counseling. Call  to find an office near you.    Milk banking    [facility]  [email or web address]  [phone]  Consider freezing your extra collected milk to donate  to babies in need. Email or call for information.                       If you are deaf or hard of hearing, please let us know. We provide many free services including  sign language interpreters, oral interpreters, TTYs, telephone amplifiers, note takers and written materials.

## 2021-06-08 NOTE — PROGRESS NOTES
"Cynthia Abdul is a 34 y.o. female who is being evaluated via a billable video visit.      The patient has been notified of following:     \"This video visit will be conducted via a call between you and your physician/provider. We have found that certain health care needs can be provided without the need for an in-person physical exam.  This service lets us provide the care you need with a video conversation.  If a prescription is necessary we can send it directly to your pharmacy.  If lab work is needed we can place an order for that and you can then stop by our lab to have the test done at a later time.    Video visits are billed at different rates depending on your insurance coverage. Please reach out to your insurance provider with any questions.    If during the course of the call the physician/provider feels a video visit is not appropriate, you will not be charged for this service.\"    Patient has given verbal consent to a Video visit? Yes    Patient would like to receive their AVS by AVS Preference: Mail a copy.    Patient would like the video invitation sent by: Text to cell phone: [phone]    Will anyone else be joining your video visit? No     Start Time: 2:32 PM    S:  Cynthia Abdul is a 34 y.o. female who joins a telephone visit after she was unable to connect to the video  for   Chief Complaint   Patient presents with     Routine Prenatal Visit     36w4d    she is doing well.  She is feeling the baby move.  No bleeding, cramping or lof.          gbs was negative at last visit.  Reviewed this result today.      She was noted to have very low ferritin and iron , and her hemoglobin was 8.9.  She was referred for iron infusions.  Her first visit is scheduled for 5/29 for iv iron.    She was also recommended to continue to take her oral iron and her pnv.      I reviewed the pertinent family, social, surgical, medical history.      O:  LMP 10/30/2019 (Within Days)   Gen:  nad  No labored breathing.  "     Patient Active Problem List   Diagnosis     Subchorionic hematoma in first trimester     Influenza due to influenza virus, type B     Pregnant; Due [birth year] 19, 2020     Varicose veins during pregnancy, antepartum     Iron deficiency anemia secondary to inadequate dietary iron intake     Hyperemesis gravidarum     Current Outpatient Medications on File Prior to Visit   Medication Sig Dispense Refill     ascorbic acid, vitamin C, (VITAMIN C) 250 MG tablet Take 1 tablet (250 mg total) by mouth daily. 90 tablet 3     calcium, as carbonate, (TUMS) 200 mg calcium (500 mg) chewable tablet Chew 1 tablet (200 mg total) daily. 90 tablet 3     ferrous sulfate 325 (65 FE) MG tablet Take 1 tablet (325 mg total) by mouth daily with breakfast. 90 tablet 3     fluticasone propionate (FLONASE) 50 mcg/actuation nasal spray 2 sprays into each nostril daily. 16 g 11     ketotifen (ZADITOR/ZYRTEC ITCHY EYES) 0.025 % (0.035 %) ophthalmic solution Apply 1 drop to each eye daily. 10 mL 2     prenatal vit-iron fum-folic ac (PRENATAL VITAMIN) 27 mg iron- 0.8 mg Tab tablet Take 1 tablet by mouth daily. 90 tablet 3     senna (SENOKOT) 8.6 mg tablet Take 1 tablet by mouth daily as needed for constipation. 60 tablet 1     albuterol (PROAIR HFA;PROVENTIL HFA;VENTOLIN HFA) 90 mcg/actuation inhaler Inhale 2 puffs every 6 (six) hours as needed for wheezing. 18 g 11     hydrOXYzine HCL (ATARAX) 25 MG tablet Take 2 tablets (50 mg total) by mouth at bedtime as needed (unable to sleep). 60 tablet 1     inhalational spacing device (AEROCHAMBER MV) Spcr Use with albuterol inhaler 1 each 0     metoclopramide (REGLAN) 5 MG tablet Take 1 tablet (5 mg total) by mouth 3 (three) times a day as needed for nausea. 180 tablet 1     ondansetron (ZOFRAN) 4 MG tablet Take 1 tablet (4 mg total) by mouth every 8 (eight) hours as needed for nausea. 30 tablet 1     No current facility-administered medications on file prior to visit.           No results found for this  or any previous visit (from the past 48 hour(s)).     No images are attached to the encounter or orders placed in the encounter.       Assessment/Plan:  1. Iron deficiency anemia secondary to inadequate dietary iron intake  Reviewed the iron infusions, and high iron diet.  Reviewed trying to avoid blood transfusion.    I did encourage her to continue with her prenatal vitamin and her oral iron.  She had some questions about differentiating this from her docusate sodium which she said look very similar.  She also had some questions about why her hemoglobin was so low at this time.  I did review her ferritin and iron levels as well as her dropping hemoglobin.  We did review that if she has significant bleeding following the delivery that she may still require a blood transfusion but that the goal of these iron infusions was to increase her hemoglobin to the point where that would not be necessary.    2. Pregnancy, unspecified gestational age      3. Supervision of other high risk pregnancies, third trimester  Reviewed when to go to the hospital  [facility] for ECV.    Reviewed an ECV, risks and benefits.    Reviewed negative GBS.    Reviewed that a  would be recommended if this fails.      4.  Breech position  I will have her be seen in 1 week in person to recheck the position of the baby.        Faith Castillo   2020 2:33 PM       Length of call 22 minutes      Faith Castillo MD

## 2021-06-08 NOTE — PROGRESS NOTES
Cynthia arrived A&Ox4 ambulatory and stable, confirms she is here for venofer infusion #4. POC/medication education reviewed, pt stated understanding and agreed to plan. She states she had no issues and feels she tolerated venofer well. Seated in chair #6  Pt had OB appt today, she is 37w5d, reports good fetal movement.  PIV w ease R FA, excellent blood return, line easily flushed NS  venofer 200mg given as ordered; line flushed with NS until clear . Pt was monitored  post infusion, VSS and no sxs adverse Rxn  PIV dc'd and site covered with gauze/coban. Dc education/AVS reviewed, pt stated understanding and that her needs were met today  Cynthia dc'd A&Ox4 ambulatory and stable, will return tuesday for her final infusion

## 2021-06-08 NOTE — TELEPHONE ENCOUNTER
Ok to discontinue.  It is not on her med list at this time.  Home care will also d/c infusion therapy.

## 2021-06-08 NOTE — PROGRESS NOTES
"Subjective:   Cynthia Abdul is a 34 y.o.  at 37w5d who is seen for routine prenatal care.   Doing well.  Endorses normal fetal movement.  Has gotten 3 dose of IV iron; no sig change  Saw OB last week and baby was determined to be head-down per patient. She did feel a big movement before that.    The following portions of the patient's history were reviewed and updated as appropriate: allergies, current medications, and problem list   Objective   Vitals: BP 90/60   Pulse 88   Temp 97.9  F (36.6  C) (Oral)   Resp 24   Ht 5' 8\" (1.727 m)   Wt 187 lb (84.8 kg)   LMP 10/30/2019 (Within Days)   SpO2 96%   BMI 28.43 kg/m     Exam: Per flowsheet  Assessment/Plan:   34 y.o.  at 37w5d    1. Supervision of other high risk pregnancies, third trimester    Generally doing well.    Reviewed skin-to-skin, delayed cord clamping, baby meds and vaccines, infant circumcision.    Baby had been breech, but turned cephalic without intervention.     Cephalic presentation confirmed today with hand-held ultrasound.    Per her request, I reprinted compression stocking rx for her varicose veins; she's going to pick these up today.    Is part way through Venofer infusions for iron deficiency anemia and will plan to continue.    Return in 1 week (on 6/10/2020) for prenatal care.   "

## 2021-06-08 NOTE — TELEPHONE ENCOUNTER
Called to reschedule some upcoming appts had to leave .    ----- Message from Taisha Cadena MD sent at 5/2/2020 12:05 PM CDT -----  Regarding: change upcoming ob visits  Could you please let patient know that after she left I realized her remaining appointment should be adjusted slightly as follows:    Cancel virtual visit with Dr. Russo 5/13.    Instead, see Dr Castillo in clinic late the week of 5/18 (ideally around Thur or Fri that week, okay earlier if she can't come then)    Change 5/26 visit with Jonathan to be virtual visit (she will not be in clinic that week)

## 2021-06-08 NOTE — TELEPHONE ENCOUNTER
The baby was head down at the ob's office.  Will continue to check head position when she comes in to the office.

## 2021-06-08 NOTE — PROGRESS NOTES
She is doing well.  Denies any bleeding, cramping ,or lof.  Baby is moving well.    Denies any headaches or vision changes.    Denies any lower extremity edema.    Denies any ruq pain.  Denies itching. .   reviewed when to go to the hospital.    She went to OB and the baby had flipped to vertex.    She feels more pressure in the vaginal area.    She is eating ok.  Her weight is down 2 lbs.  She is eating sometimes only once or twice daily.  Encouraged to eat small meals 3-4 times daily .   Reviewed hip US for baby post delivery.

## 2021-06-08 NOTE — PROGRESS NOTES
joaquina arrived A&Ox4 ambulatory and stable, confirms she is here for venofer infusion #2. POC/medication education reviewed,pt stated understanding and agreed to plan. She states she had no issues over the weekend and feels she tolerated venofer well  PIV w ease R FA, excellent blood return, line easily flushed NS  venofer 200mg given as ordered; line flushed with NS until clear . Pt was monitored 30+ min post infusion, VSS and no sxs adverse Rxn  PIV dc'd and site covered with gauze/coban. Dc education/AVS reviewed, pt stated understanding and that her needs were met today  1545 Jourdanclaudia dc'd A&Ox4 ambulatory and stable, will return Wednesday for next infusion

## 2021-06-08 NOTE — TELEPHONE ENCOUNTER
Patient Returning Call  Reason for call:  Return call.  Information relayed to patient:  Patient declined an . Patient was informed of her results from Dr. Castillo.   Patient has additional questions:  No  If YES, what are your questions/concerns:  n/a  Okay to leave a detailed message?: No call back needed

## 2021-06-08 NOTE — TELEPHONE ENCOUNTER
Who is calling:  Patient  Reason for Call:  Patient is not interested in having another ultrasound in 2 months, patient is not worried about babies weight, or if the head is down, per patient I have had other babies and do not have any problems. Please contact patient, she sounded like she needed to be sure pcp understood she was not interested.   Date of last appointment with primary care: na  Okay to leave a detailed message: Yes

## 2021-06-09 NOTE — TELEPHONE ENCOUNTER
To whom it may concern, pt forgot her appointment on 6/23/2020. I called on that day but no answer and I did not leave message I meant to call back later and did not. So Dr Castillo asked me to call pt on 6/24/2020 to ask pt to come in it was very important but pt said she was too busy and preferred to come in Thursday 6/25/2020 instead. I stressed how important it was for her to make in for NST and check to see how baby was doing.

## 2021-06-09 NOTE — PROGRESS NOTES
She didn't drink much water today.    She has been feeling a lot of contractions in the past 2 days . None of these are very strong.  They don't wake her up.  Advised to increase water intake.    She is in the process of getting ready to move.  She has been working hard and packing boxes.  She is not going to lift anything.    She saw a bit of blood, but that stopped.    Reviewed when to go to the hospital.    Needs nst at next visit.

## 2021-06-09 NOTE — PROGRESS NOTES
Pt presented for an NST. , category 1 tracing, ctx are 2-7 min, lasting 45 sec and are mild. SVE 3.5/60/-1, no bloody show. Dr Castillo updated. Pt is stable to d/c home and call with questions, concerns or if she feels she is laboring. D/c inst provided.

## 2021-06-09 NOTE — PROGRESS NOTES
Has moved house.  Is very tired.   Has a lot of ctx nightly but no lof or bleeding.  Baby is moving a lot.    Reviewed induction with cytotec vs pitocin.  Reviewed risks and benefits of continuing the pregnancy now.  She declines to schedule an induction at this time .   nst done today, but unable to get a reactive strip due to significant fetal movement and position.  Will do bpp and nst later today at 7pm.    bpp and nst scheduled for monday.  Reviewed that if any testing does not turn out well, that the recommendation would be to have an induction at that time.    Needs follow up appt here on Tuesday.   Membranes swept today, with some bloody return on the glove.

## 2021-06-10 NOTE — TELEPHONE ENCOUNTER
The covid 19 test has been ordered.  Someone will call her to schedule at a drive up testing center.    If she has worsening chest pain, sobr, uncontrollable fever, then she should go to the hospital.

## 2021-06-10 NOTE — TELEPHONE ENCOUNTER
Called pt and found copy of DL and mailing to pt.  Pt also needs last 2 copies of apt notes - having Dr. Castillo sign and also will mail to pt to prove her identity.  Thanks.

## 2021-06-10 NOTE — TELEPHONE ENCOUNTER
Who is calling:  Patient   Reason for Call:  Caller stated that her denitrification, ID and social was stolen and was wondering if Faith Castillo MD can give some kind of proof that she is her therefore she can bring it to the county to get help.    Date of last appointment with primary care:   Okay to leave a detailed message: Yes

## 2021-06-10 NOTE — TELEPHONE ENCOUNTER
RN Triage:    Spoke with 34 yr old Cynthia who c/o:    Requests COVID-19 testing.    States she was in direct contact with many family member and neighbors with positive COVID-19 testing.    Reports:    Constant Headache    Slight cough.    Constant chest pain described as mild and seems to be improving.    Body aches     Felt feverish 2 days ago, but denies fever yesterday or today.    Denies shortness of breath.    PLAN:  Advised ED per protocol for constant mild chest pain.  Pt declines stating her chest pain is improving, but constant, requesting COVID-19 testing outpatient.  Pt declines [email or web address] due to financial reasons/copay.  Will consult with PCP regarding virtual visit for this patient or ED.  Please advise.  Advised pt to call back if symptoms are worsening.  Advised to drink plenty of fluids and monitor for fever or shortness of breath.  Advised to stay home.    Jodi Guzman RN   [facility] RN Triage          Reason for Disposition    SEVERE or constant chest pain or pressure (Exception: mild central chest pain, present only when coughing)     Constant chest pain but mild.    Additional Information    Negative: SEVERE difficulty breathing (e.g., struggling for each breath, speaks in single words)    Negative: Difficult to awaken or acting confused (e.g., disoriented, slurred speech)    Negative: Bluish (or gray) lips or face now    Negative: Shock suspected (e.g., cold/pale/clammy skin, too weak to stand, low BP, rapid pulse)    Negative: Sounds like a life-threatening emergency to the triager    Negative: [1] COVID-19 exposure AND [2] no symptoms    Negative: COVID-19 and Breastfeeding, questions about    Negative: [1] Adult with possible COVID-19 symptoms AND [2] triager concerned about severity of symptoms or other causes    Protocols used: CORONAVIRUS (COVID-19) DIAGNOSED OR [id number] 5.16.20

## 2021-06-12 NOTE — PROGRESS NOTES
Please call patient:    All labs look good except one of her hormone levels is very slightly elevated. This can happen after eating. It is not a problem at this time. Let's plan to have her come back the week of Nov 16th while fasting to recheck this lab (lab only visit). If she gets her menstrual cycle before then, she does not need to come back for recheck.     Sharon Falcon

## 2021-06-12 NOTE — TELEPHONE ENCOUNTER
cetirizine (ZYRTEC) 5 MG tablet [[id number]]    Electronically signed by: Sharon Neri MD on 09/27/19 1545 Status: Discontinued   Ordering user: Sharon Neri MD 09/27/19 1545 Authorized by: Sharon Neri MD   Frequency: DAILY 09/27/19 - 02/05/20  Discontinued by: Taisha Cadena MD 02/05/20 1035   Diagnoses

## 2021-06-12 NOTE — PROGRESS NOTES
"       EVER Abdul is a 35 y.o.  female with a PMH significant for:     Patient Active Problem List   Diagnosis     Subchorionic hematoma in first trimester     Influenza due to influenza virus, type B     Varicose veins during pregnancy, antepartum     Iron deficiency anemia secondary to inadequate dietary iron intake     Hyperemesis gravidarum     Vaginal delivery     Postpartum hemorrhage, unspecified type       Presents with the following concerns:  1. Eye itching - she has had this before but not as bad. Requests allergy drops. Itching is worse in winter. Having lots of runny nose. No cough. No itchy nose. No changes in vision or eye pain. Has been prescribed antihistamine drops previously. No pets or known triggers.   2. Knee pain - Bilateral aching pain in both knees going on for past 1 month. worse with walking up stairs. Pain is in the middle of her knees. Develops later in the day. Worried she is not taking enough calcium as she is not drinking much milk. Denies injury or prior knee problems. No fever, chill, nausea, vomiting, diarrhea. No redness or rash. No therapies tried. She is not interested in PT.   3. No period since birth of baby in June. She is wondering why this is.  She stopped breast feeding at the beginning of august. Not using birth control and she is sexually active but does not feel she is pregnant. No nausea, breast tenderness. No fatigue. She has chronic constipation. No dry skin. No vision problems, balance or dizziness problems. She has had 6 babies before and got her periods back after 1 month with each.    Past medical, family and social history, medications, and allergies were reviewed and updated as needed.      A 10 point ROS was negative except for those included above.        OBJECTIVE     Vitals:    10/28/20 1349   BP: 106/62   Pulse: (!) 56   Resp: 20   Temp: 97.9  F (36.6  C)   TempSrc: Oral   Weight: 199 lb 8 oz (90.5 kg)   Height: 5' 8\" (1.727 m)     Body " mass index is 30.33 kg/m .    GENERAL: No acute distress, non-toxic appearing  HEAD: Atraumatic, normocephalic  EYES: PERRLA, EOMI, diffuse conjunctival injection, no discharge, anicteric  NOSE: Septum midline, no discharge  THROAT: Moist mucous membranes, oropharynx is patent, no tonsillar swelling, exudate or asymmetry, no oral lesions, voice is clear, no trismus  NECK: Supple with full ROM, trachea midline, no palpable thyroid nodules, no LAD  CV: Regular rate and rhythm, no murmurs or rubs  LUNGS: Respirations unlabored, no wheezes, crackles or rales  ABD: Soft, non-distended, non-tender throughout, no rebound, guarding or peritoneal signs   EXT: No peripheral edema, atraumatic  MSK: No warmth or swelling of knees, tender to palpation diffusely along b/l medial and lateral joint lines; no other point tenderness, no crepitus, no varus or valgus laxity, neg anterior and posterior drawer  SKIN: No rash, warm and dry  NEURO: Alert, oriented x3, normal tone throughout, gait is normal  PSYCH: euthymic, normal affect, thought content is appropriate      LABS/IMAGING/EKG  Results for orders placed or performed in visit on 10/28/20   Pregnancy (Beta-hCG, Qual), Urine   Result Value Ref Range    Pregnancy Test, Urine Negative Negative   Thyroid Stimulating Hormone (TSH)   Result Value Ref Range    TSH 1.75 0.30 - 5.00 uIU/mL   T4, Free   Result Value Ref Range    Free T4 0.9 0.7 - 1.8 ng/dL   Prolactin   Result Value Ref Range    Prolactin 25.2 (H) 0.0 - 20.0 ng/mL   Follicle Stimulating Hormone (FSH)   Result Value Ref Range    FSH <3.0 mIU/mL   HM2(CBC w/o Differential)   Result Value Ref Range    WBC 5.5 4.0 - 11.0 thou/uL    RBC 4.39 3.80 - 5.40 mill/uL    Hemoglobin 13.1 12.0 - 16.0 g/dL    Hematocrit 39.2 35.0 - 47.0 %    MCV 89 80 - 100 fL    MCH 29.9 27.0 - 34.0 pg    MCHC 33.5 32.0 - 36.0 g/dL    RDW 12.0 11.0 - 14.5 %    Platelets 183 140 - 440 thou/uL    MPV 9.1 7.0 - 10.0 fL         ASSESSMENT AND PLAN   1.  Amenorrhea  Labs checked as above and were all unremarkable apart from slightly high prolactin, which can be affected if not fasting. Asked patient to return in two weeks fasting to recheck this lab unless develops menstruation sooner. She is about 6-7 weeks out from stopping breastfeeding so it could be she just needs a little more time to resume menstruation. We discussed the possibilities today and I answered all her questions.   - Pregnancy (Beta-hCG, Qual), Urine  - Thyroid Stimulating Hormone (TSH)  - T4, Free  - Prolactin  - Follicle Stimulating Hormone (FSH)  - HM2(CBC w/o Differential)  - Prolactin; Future    2. Allergic conjunctivitis, bilateral  History consistent with allergies. Will refill antihistamine drops.   - ketotifen (ZADITOR/ZYRTEC ITCHY EYES) 0.025 % (0.035 %) ophthalmic solution; Apply 1 drop to each eye daily.  Dispense: 10 mL; Refill: 2  - carboxymethylcellulose (REFRESH LIQUIGEL) 1 % ophthalmic solution; Place 1 drop in both eyes as needed throughout the day.  Dispense: 30 mL; Refill: 12    3. Pain in both knees, unspecified chronicity  No history of injury or exam findings suggestive of ligamentous disease. She is a bit young for OA. Due to short nature and otherwise reassuring exam, will try Tylenol or ibuprofen. Follow up if not improving.         Sharon Falcon MD

## 2021-06-13 NOTE — TELEPHONE ENCOUNTER
Called pharmacy and Refresh eye gtts were ordered OTC so CMT called in.  The [facility] gtts are ready to .  Called pt to relay.  Pt appreciative.  Thanks.

## 2021-06-13 NOTE — TELEPHONE ENCOUNTER
Medication Request  Medication name: Eye drops, patient stated there were supposed to be 2 different types pf eye drops.  Requested Pharmacy: [id number]  Reason for request: Allergic conjunctivitis  When did you use medication last?:  n/a  Patient offered appointment:  Patient was recently seen and was informed by Dr. Falcon that she will be sending 2 different types of eye drops. Patient stated the pharmacy does not have these prescriptions. Patient stated she does not know the name of these eye drops.  Okay to leave a detailed message: yes  [phone]

## 2021-06-13 NOTE — TELEPHONE ENCOUNTER
RN cannot approve Refill Request    RN can NOT refill this medication medication not on med list. Last office visit: Visit date not found Last Physical: Visit date not found Last MTM visit: Visit date not found Last visit same specialty: 10/28/2020 Sharon Falcon MD.  Next visit within 3 mo: Visit date not found  Next physical within 3 mo: Visit date not found      Marisela Vazquez, [facility] Triage/Med Refill 11/15/2020    Requested Prescriptions   Pending Prescriptions Disp Refills     cetirizine (ZYRTEC) 5 MG tablet [Pharmacy Med Name: CETIRIZINE HCL 5 MG TABLET] 30 tablet 11     Sig: TAKE 1 TABLET BY MOUTH EVERY DAY       Antihistamine Refill Protocol Passed - 11/11/2020 10:38 AM        Passed - Patient has had office visit/physical in last year     Last office visit with prescriber/PCP: Visit date not found OR same dept: 10/28/2020 Sharon Falcon MD OR same specialty: 10/28/2020 Sharon Falcon MD  Last physical: Visit date not found Last MTM visit: Visit date not found   Next visit within 3 mo: Visit date not found  Next physical within 3 mo: Visit date not found  Prescriber OR PCP: Faith Castillo MD  Last diagnosis associated with med order: 1. Allergic rhinitis, unspecified seasonality, unspecified trigger  - cetirizine (ZYRTEC) 5 MG tablet [Pharmacy Med Name: CETIRIZINE HCL 5 MG TABLET]; TAKE 1 TABLET BY MOUTH EVERY DAY  Dispense: 30 tablet; Refill: 11    If protocol passes may refill for 12 months if within 3 months of last provider visit (or a total of 15 months).

## 2021-06-16 PROBLEM — O41.8X10 SUBCHORIONIC HEMATOMA IN FIRST TRIMESTER: Status: ACTIVE | Noted: 2020-01-16

## 2021-06-16 PROBLEM — O21.0 HYPEREMESIS GRAVIDARUM: Status: ACTIVE | Noted: 2020-05-19

## 2021-06-16 PROBLEM — J10.1 INFLUENZA DUE TO INFLUENZA VIRUS, TYPE B: Status: ACTIVE | Noted: 2020-01-21

## 2021-06-16 PROBLEM — D50.8 IRON DEFICIENCY ANEMIA SECONDARY TO INADEQUATE DIETARY IRON INTAKE: Status: ACTIVE | Noted: 2020-05-19

## 2021-06-16 PROBLEM — O46.8X1 SUBCHORIONIC HEMATOMA IN FIRST TRIMESTER: Status: ACTIVE | Noted: 2020-01-16

## 2021-06-16 PROBLEM — O22.00 VARICOSE VEINS DURING PREGNANCY, ANTEPARTUM: Status: ACTIVE | Noted: 2020-04-28

## 2021-06-17 NOTE — TELEPHONE ENCOUNTER
Telephone Encounter by Ines Enriquez CMA at 11/11/2020 10:54 AM     Author: Ines Enriquez CMA Service: -- Author Type: Certified Medical Assistant    Filed: 11/11/2020 11:01 AM Encounter Date: 11/11/2020 Status: Signed    : Ines Enriquez CMA (Certified Medical Assistant)       FYI - Status Update  Who is Calling: Patient  Update: Patient calling to check test results. Patient was informed of message below. Patient verbalized understanding and has no further questions at this time. Patient transferred to scheduling.    Copied and pasted:   Evangelina Goins MA   10/29/2020  2:54 PM      Called pt about lab results and requested to schedule fasting lab apt for week of November 16th.  Thanks,    Sharon Falcon MD   10/29/2020  8:19 AM      Please call patient:     All labs look good except one of her hormone levels is very slightly elevated. This can happen after eating. It is not a problem at this time. Let's plan to have her come back the week of Nov 16th while fasting to recheck this lab (lab only visit). If she gets her menstrual cycle before then, she does not need to come back for recheck.      Sharon Falcon       Okay to leave a detailed message?:  No return call needed

## 2021-06-18 NOTE — PATIENT INSTRUCTIONS - HE
Patient Instructions by Marielena Rosario RN at 6/1/2020  2:00 PM     Author: Marielena Rosario RN Service: -- Author Type: Registered Nurse    Filed: 6/1/2020  3:33 PM Encounter Date: 6/1/2020 Status: Signed    : Marielena Rosario RN (Registered Nurse)         Patient Education     Iron Sucrose injection  Brand Name: Venofer  What is this medicine?  IRON SUCROSE (AHY raymond SIGRID krohs) is an iron complex. Iron is used to make healthy red blood cells, which carry oxygen and nutrients throughout the body. This medicine is used to treat iron deficiency anemia in people with chronic kidney disease.  How should I use this medicine?  This medicine is for infusion into a vein. It is given by a health care professional in a hospital or clinic setting.  Talk to your pediatrician regarding the use of this medicine in children. While this drug may be prescribed for children as young as 2 years for selected conditions, precautions do apply.  What side effects may I notice from receiving this medicine?  Side effects that you should report to your doctor or health care professional as soon as possible:    allergic reactions like skin rash, itching or hives, swelling of the face, lips, or tongue    breathing problems    changes in blood pressure    cough    fast, irregular heartbeat    feeling faint or lightheaded, falls    fever or chills    flushing, sweating, or hot feelings    joint or muscle aches/pains    seizures    swelling of the ankles or feet    unusually weak or tired  Side effects that usually do not require medical attention (report to your doctor or health care professional if they continue or are bothersome):    diarrhea    feeling achy    headache    irritation at site where injected    nausea, vomiting    stomach upset    tiredness  What may interact with this medicine?  Do not take this medicine with any of the following medications:    deferoxamine    dimercaprol    other iron products  This  medicine may also interact with the following medications:    chloramphenicol    deferasirox  What if I miss a dose?  It is important not to miss your dose. Call your doctor or health care professional if you are unable to keep an appointment.  Where should I keep my medicine?  This drug is given in a hospital or clinic and will not be stored at home.  What should I tell my health care provider before I take this medicine?  They need to know if you have any of these conditions:    anemia not caused by low iron levels    heart disease    high levels of iron in the blood    kidney disease    liver disease    an unusual or allergic reaction to iron, other medicines, foods, dyes, or preservatives    pregnant or trying to get pregnant    breast-feeding  What should I watch for while using this medicine?  Visit your doctor or healthcare professional regularly. Tell your doctor or healthcare professional if your symptoms do not start to get better or if they get worse. You may need blood work done while you are taking this medicine.  You may need to follow a special diet. Talk to your doctor. Foods that contain iron include: whole grains/cereals, dried fruits, beans, or peas, leafy green vegetables, and organ meats (liver, kidney).  NOTE:This sheet is a summary. It may not cover all possible information. If you have questions about this medicine, talk to your doctor, pharmacist, or health care provider. Copyright  2018 [employer]

## 2021-06-18 NOTE — PATIENT INSTRUCTIONS - HE
Patient Instructions by Marielena Rosario RN at 5/29/2020  2:00 PM     Author: Marielena Rosario RN Service: -- Author Type: Registered Nurse    Filed: 5/29/2020  3:02 PM Encounter Date: 5/29/2020 Status: Addendum    : Marielena Rosario RN (Registered Nurse)    Related Notes: Original Note by Marielena Rosario RN (Registered Nurse) filed at 5/29/2020  3:02 PM         Patient Education     Iron Sucrose injection  Brand Name: Venofer  What is this medicine?  IRON SUCROSE (AHY raymond SIGRID krohs) is an iron complex. Iron is used to make healthy red blood cells, which carry oxygen and nutrients throughout the body. This medicine is used to treat iron deficiency anemia in people with chronic kidney disease.  How should I use this medicine?  This medicine is for infusion into a vein. It is given by a health care professional in a hospital or clinic setting.  Talk to your pediatrician regarding the use of this medicine in children. While this drug may be prescribed for children as young as 2 years for selected conditions, precautions do apply.  What side effects may I notice from receiving this medicine?  Side effects that you should report to your doctor or health care professional as soon as possible:    allergic reactions like skin rash, itching or hives, swelling of the face, lips, or tongue    breathing problems    changes in blood pressure    cough    fast, irregular heartbeat    feeling faint or lightheaded, falls    fever or chills    flushing, sweating, or hot feelings    joint or muscle aches/pains    seizures    swelling of the ankles or feet    unusually weak or tired  Side effects that usually do not require medical attention (report to your doctor or health care professional if they continue or are bothersome):    diarrhea    feeling achy    headache    irritation at site where injected    nausea, vomiting    stomach upset    tiredness  What may interact with this medicine?  Do not take this  medicine with any of the following medications:    deferoxamine    dimercaprol    other iron products  This medicine may also interact with the following medications:    chloramphenicol    deferasirox  What if I miss a dose?  It is important not to miss your dose. Call your doctor or health care professional if you are unable to keep an appointment.  Where should I keep my medicine?  This drug is given in a hospital or clinic and will not be stored at home.  What should I tell my health care provider before I take this medicine?  They need to know if you have any of these conditions:    anemia not caused by low iron levels    heart disease    high levels of iron in the blood    kidney disease    liver disease    an unusual or allergic reaction to iron, other medicines, foods, dyes, or preservatives    pregnant or trying to get pregnant    breast-feeding  What should I watch for while using this medicine?  Visit your doctor or healthcare professional regularly. Tell your doctor or healthcare professional if your symptoms do not start to get better or if they get worse. You may need blood work done while you are taking this medicine.  You may need to follow a special diet. Talk to your doctor. Foods that contain iron include: whole grains/cereals, dried fruits, beans, or peas, leafy green vegetables, and organ meats (liver, kidney).  NOTE:This sheet is a summary. It may not cover all possible information. If you have questions about this medicine, talk to your doctor, pharmacist, or health care provider. Copyright  2018 [employer]

## 2021-06-18 NOTE — PATIENT INSTRUCTIONS - HE
Patient Instructions by Leonor Rudd RN at 6/9/2020  2:00 PM     Author: Leonor Rudd RN Service: -- Author Type: Registered Nurse    Filed: 6/9/2020  3:07 PM Encounter Date: 6/9/2020 Status: Signed    : Leonor Rudd RN (Registered Nurse)       Call your doctor if problems or questions and seek medical help as needed.     Patient Education     Iron Sucrose injection  Brand Name: Venofer  What is this medicine?  IRON SUCROSE (MAGNOLIA raymond SIGRID krohs) is an iron complex. Iron is used to make healthy red blood cells, which carry oxygen and nutrients throughout the body. This medicine is used to treat iron deficiency anemia in people with chronic kidney disease.  How should I use this medicine?  This medicine is for infusion into a vein. It is given by a health care professional in a hospital or clinic setting.  Talk to your pediatrician regarding the use of this medicine in children. While this drug may be prescribed for children as young as 2 years for selected conditions, precautions do apply.  What side effects may I notice from receiving this medicine?  Side effects that you should report to your doctor or health care professional as soon as possible:    allergic reactions like skin rash, itching or hives, swelling of the face, lips, or tongue    breathing problems    changes in blood pressure    cough    fast, irregular heartbeat    feeling faint or lightheaded, falls    fever or chills    flushing, sweating, or hot feelings    joint or muscle aches/pains    seizures    swelling of the ankles or feet    unusually weak or tired  Side effects that usually do not require medical attention (report to your doctor or health care professional if they continue or are bothersome):    diarrhea    feeling achy    headache    irritation at site where injected    nausea, vomiting    stomach upset    tiredness  What may interact with this medicine?  Do not take this medicine with any of the following  medications:    deferoxamine    dimercaprol    other iron products  This medicine may also interact with the following medications:    chloramphenicol    deferasirox  What if I miss a dose?  It is important not to miss your dose. Call your doctor or health care professional if you are unable to keep an appointment.  Where should I keep my medicine?  This drug is given in a hospital or clinic and will not be stored at home.  What should I tell my health care provider before I take this medicine?  They need to know if you have any of these conditions:    anemia not caused by low iron levels    heart disease    high levels of iron in the blood    kidney disease    liver disease    an unusual or allergic reaction to iron, other medicines, foods, dyes, or preservatives    pregnant or trying to get pregnant    breast-feeding  What should I watch for while using this medicine?  Visit your doctor or healthcare professional regularly. Tell your doctor or healthcare professional if your symptoms do not start to get better or if they get worse. You may need blood work done while you are taking this medicine.  You may need to follow a special diet. Talk to your doctor. Foods that contain iron include: whole grains/cereals, dried fruits, beans, or peas, leafy green vegetables, and organ meats (liver, kidney).  NOTE:This sheet is a summary. It may not cover all possible information. If you have questions about this medicine, talk to your doctor, pharmacist, or health care provider. Copyright  2018 [employer]

## 2021-06-19 NOTE — LETTER
Letter by Tracey Vazquez PA-C at      Author: Tracey Vazquez PA-C Service: -- Author Type: --    Filed:  Encounter Date: 7/23/2019 Status: (Other)         Cynthia Abdul  [address] [place] [address]             July 23, 2019         Dear MsFawn Abdul,    Below are the results from your recent visit:    Resulted Orders   US Pelvis With Transvaginal Non OB    Narrative    EXAM: US PELVIS WITH TRANSVAGINAL NON OB  LOCATION: [facility]  DATE/TIME: 7/23/2019 2:50 PM    INDICATION: Unspecified pain.  COMPARISON: None.    TECHNIQUE: Transabdominal scans were performed. Endovaginal ultrasound was performed to better visualize the adnexa.    FINDINGS:  Uterus measures 8 x 4 x 5 cm. Normal uterus with no masses.    Endometrial thickness is 9 mm. Normal smooth endometrium.     Right ovary measures 3.5 x 2.1 x 2.7 cm. Normal right ovary. Involuting 1.3 x 0.8 x 1.6 cm right ovarian cyst. Normal arterial duplex.    Left ovary measures 2.2 x 1.5 x 1.7 cm. Normal left ovary. Normal arterial duplex.    No significant free fluid in the cul-de-sac.      Impression    CONCLUSION:  1.  Unremarkable ultrasound of the pelvis.  2.  Involuting small right ovarian cyst.            Pelvic ultrasound is normal    Please call with questions or contact us using [facility].    Sincerely,        Electronically signed by Tracey Vazquez PA-C

## 2021-06-19 NOTE — LETTER
Letter by Tracey Vazquez PA-C at      Author: Tracey Vazquez PA-C Service: -- Author Type: --    Filed:  Encounter Date: 8/8/2019 Status: (Other)         Cynthia Abdul  [address] [place] [address]          08/08/19    Dear  Cynthia Abdul  [id number]    This letter is in regards to the appointment that you had scheduled on August 05, 2019 at the [facility] with Tracey Shields PA-C.     The [facility] strives to see all patients in a timely manner and we need your help to achieve this.  The above-mentioned appointment was missed and we do not have record of a cancellation by you.  Whenever possible, we request appointment cancellations at least 24 hours in advance.  This time allows us to offer the appointment to another patient in need.      If you feel you have received this letter in error, or if you need to reschedule this appointment, please call our office so that we may update our records.      Sincerely,    [facility]

## 2021-06-20 NOTE — LETTER
Letter by Faith Castillo MD at      Author: Faith Castillo MD Service: -- Author Type: --    Filed:  Encounter Date: 2/10/2020 Status: (Other)         February 10, 2020     Patient: Cynthia Abdul   YOB: 1985   Date of Visit: 2/10/2020       To Whom it May Concern:    Cynthia Abdul was seen in my clinic on 2/10/2020. She is suffering from hyperemesis gravidarum.  This will require her to get infusions at a minimum every other day.  It may also require her to have a few days where she is absent due to severe vomiting, and dehydration..    If you have any questions or concerns, please don't hesitate to call.    Sincerely,         Electronically signed by Faith Castillo MD

## 2021-06-25 ENCOUNTER — COMMUNICATION - HEALTHEAST (OUTPATIENT)
Dept: FAMILY MEDICINE | Facility: CLINIC | Age: 36
End: 2021-06-25

## 2021-06-25 DIAGNOSIS — Z3A.21 21 WEEKS GESTATION OF PREGNANCY: ICD-10-CM

## 2021-06-25 NOTE — PROGRESS NOTES
PRENATAL VISIT   FIRST OBSTETRICAL EXAM - OB    Assessment / Impression       Normal first prenatal visit at 13w1d, though these dates may not be correct.    Discussed orientation, general information, lifestyle, nutrition, exercise,warning signs, resources, lab testing, risk screening and discussed cystic fibrosis screening with patient.  Questions answered.    Plan:     Obtain US for dates as she is feeling some movement.  She started feeling this in the past few days.     Initial labs drawn.  Prenatal vitamins.  Problem list reviewed and updated.  Genetic screening test options discussed:  Patient elects to decline all testing  Role of ultrasound in pregnancy discussed; fetal survey: requested.  Follow up: Return in 4 weeks (on 2021) for prenatal care.      Subjective:    Cynthia Abdul is a 35 y.o.  here today for her First Obstetrical Exam.   OB History    Para Term  AB Living   7 6 6     6   SAB TAB Ectopic Multiple Live Births         0 6      # Outcome Date GA Lbr Librado/2nd Weight Sex Delivery Anes PTL Lv   7 Current            6 Term 20 41w0d 07:28 / 00:07 7 lb 7.2 oz (3.38 kg) M Vag-Spont None N GIO   5 Term         GIO   4 Term         GIO   3 Term         GIO   2 Term         GIO   1 Term         GIO       Expected Date of Delivery: 2021, by Last Menstrual Period    Past Medical History:   Diagnosis Date     Seasonal allergies      No past surgical history on file.  Social History     Tobacco Use     Smoking status: Never Smoker     Smokeless tobacco: Never Used   Substance Use Topics     Alcohol use: Never     Frequency: Never     Drug use: Never     Current Outpatient Medications   Medication Sig Dispense Refill     albuterol (PROAIR HFA;PROVENTIL HFA;VENTOLIN HFA) 90 mcg/actuation inhaler Inhale 2 puffs every 6 (six) hours as needed for wheezing. 18 g 11     calcium, as carbonate, (TUMS) 200 mg calcium (500 mg) chewable tablet Chew 1 tablet (200 mg total) daily. 90 tablet  3     carboxymethylcellulose (REFRESH LIQUIGEL) 1 % ophthalmic solution Place 1 drop in both eyes as needed throughout the day. 30 mL 12     cetirizine (ZYRTEC) 5 MG tablet TAKE 1 TABLET BY MOUTH EVERY DAY 30 tablet 11     doxylamine (UNISOM) 25 mg tablet Take 1 tablet (25 mg total) by mouth at bedtime as needed for sleep. 60 tablet 3     fluticasone propionate (FLONASE) 50 mcg/actuation nasal spray 2 sprays into each nostril daily. 16 g 11     inhalational spacing device (AEROCHAMBER MV) Spcr Use with albuterol inhaler 1 each 0     ketotifen (ZADITOR/ZYRTEC ITCHY EYES) 0.025 % (0.035 %) ophthalmic solution Apply 1 drop to each eye daily. 10 mL 2     prenatal vit no.130-iron-folic (PRENATAL VITAMIN) 27 mg iron- 800 mcg Tab tablet Take 1 tablet by mouth daily. 30 tablet 11     pyridoxine, vitamin B6, (VITAMIN B-6) 50 MG tablet Take 1 tablet (50 mg total) by mouth 3 (three) times a day. 90 tablet 3     senna (SENOKOT) 8.6 mg tablet Take 1 tablet by mouth 2 (two) times a day as needed for constipation. 60 tablet 0     No current facility-administered medications for this visit.      No Known Allergies          High Risk Behavior: Last birth within 1 year    Review of Systems  General:  Denies problem  Eyes: Denies problem  Ears/Nose/Throat: Denies problem  Cardiovascular: Denies problem  Respiratory:  Denies problem  Gastrointestinal:  Denies problem, Genitourinary: Denies problem  Musculoskeletal:  Denies problem  Skin: Denies problem  Neurologic: Denies problem  Psychiatric: Denies problem  Endocrine: Denies problem  Heme/Lymphatic: Denies problem   Allergic/Immunologic: Denies problem       Objective:   Objective    Vitals:    05/28/21 1506   BP: 104/62   Pulse: 90   Resp: 18   Temp: (!) 49.8  F (9.9  C)   TempSrc: Oral   SpO2: 97%   Weight: 198 lb (89.8 kg)     Physical Exam:  General Appearance: Alert, cooperative, no distress, appears stated age  Head: Normocephalic, without obvious abnormality, atraumatic  Eyes:  PERRL, conjunctiva/corneas clear, EOM's intact  Ears: Normal TM's and external ear canals, both ears  Nose: Nares normal, septum midline,mucosa normal, no drainage  Throat: Lips, mucosa, and tongue normal; teeth and gums normal  Neck: Supple, symmetrical, trachea midline, no adenopathy;  thyroid: not enlarged, symmetric, no tenderness/mass/nodules; no carotid bruit or JVD  Back: Symmetric, no curvature, ROM normal, no CVA tenderness  Lungs: Clear to auscultation bilaterally, respirations unlabored  Breasts: No breast masses, tenderness, asymmetry, or nipple discharge.  Heart: Regular rate and rhythm, S1 and S2 normal, no murmur, rub, or gallop, Abdomen: Soft, non-tender, bowel sounds active all four quadrants,  no masses, no organomegaly  Pelvic:Not examined  Extremities: Extremities normal, atraumatic, no cyanosis or edema  Skin: Skin color, texture, turgor normal, no rashes or lesions  Lymph nodes: Cervical, supraclavicular, and axillary nodes normal  Neurologic: Normal     Lab:   Results for orders placed or performed in visit on 04/29/21   Pregnancy (Beta-hCG, Qual), Urine   Result Value Ref Range    Pregnancy Test, Urine Positive (!) Negative

## 2021-06-26 NOTE — PATIENT INSTRUCTIONS - HE
Patient Education   HEALTHY PREGNANCY CARE: 6 to 10 WEEKS PREGNANT    Pregnancy is an important time for you to take care of yourself and your baby. There is much that you can do through simple things like nutrition and exercise that will help you achieve the best outcome possible.     Learn about the changes you and your baby will experience during pregnancy. Your baby's facial features, brain, spinal cord and internal organs are developing, and baby's heart is pumping blood. Due to hormonal changes, you may notice nausea, fatigue or breast tenderness.    Common Discomforts of Early Pregnancy  Your body goes through many changes during pregnancy. Some are noticeable like increased breast size or darkening of the color of the nipple, but some changes may cause discomfort like breast tenderness, urinary frequency, fatigue or nausea. If you have questions about the duration or severity of what you are experiencing, contact your midwife or physician for guidance.     Coping with nausea/morning sickness    Sip small amounts of water, juices, or shakes. Try drinking between meals, not with meals.     Eat 5 or 6 small meals a day. Try dry toast, crackers, or cereal when you first get up, and eat breakfast a little later.    Make nixon tea (sliced nixon root in hot water with honey). Sip a cup in the morning before getting up.    Avoid spicy, greasy, and fatty foods.     When you feel sick, open your windows or go for a short walk to get fresh air.     Try nausea wristbands. These help some women.     Call your midwife or physician if you cannot keep fluids down, or if vomiting persists. There are medications that can help.    Choose healthy foods and gain the recommended amount of weight for your size. If you have questions or follow a special diet, talk with your midwife or physician. You should take one prenatal vitamin daily.  If nausea is a problem, try taking only a folic acid supplement of 400-800mcg daily until  the nausea passes.    Follow safe guidelines for exercise. Low impact aerobic activities are generally okay during pregnancy. If you have a regular exercise routine, you should be able to continue it during pregnancy as long as it doesn't cause pain. Talk to your midwife or physician about your activity at your prenatal visits.    You can sign up for a weekly parenting e-mail that gives support, tips and advice from health care professionals that starts with pregnancy and continues through the toddler years. To register, go to [email or web address] at any time during your pregnancy.    Things to Avoid During Pregnancy  A general principal to follow during pregnancy is to stay away from anything that is strong/bad smelling (gas, paint, fumes, etc), or known to cause problems for mom or baby    Smoking (self or others)    Alcohol     Pesticides    Caffeine     Soft cheeses    Fish with high mercury content (such as shark, swordfish, tosin mackerel, or tilefish)    Some over the counter meds (ask your midwife or physician before taking)    Changing the jorge litter box    This is also a good time to think about genetic screening tests. These are tests done during pregnancy to look for possible problems with the baby. First trimester tests for Down's Syndrome, Trisomy 13 and 18 can be done as early as 10 weeks of pregnancy. Some testing can be done as late as 22 weeks of pregnancy, depending on the test. There are other tests that look for spinal defects, cystic fibrosis, Abelardo-Sachs disease. Talk with your midwife or physician about testing.    Warning Signs    Watch for warning signs, such as     vaginal bleeding    fluid leaking from your vagina    severe abdominal pain    nausea and vomiting more than 4-5 times a day, or if you are unable to keep anything down    fever more than 100.4 degrees F.     Contact your midwife or physician at [facility] at Phone: [phone] if you have these or any  other concerns. If it's after clinic hours, physician patients should call the [facility] at [phone] ([phone]); midwife patients should call their answering service at [phone].    How can you care for yourself at home?   You can refer to the Starting Out Right book or find it online at [email or web address]/stories/[email or web address]-Starting-Out-Right.pdf or http://www.[email or web address]/images/stories/flipbooks/[facility]right/[facility]-starting-out-right.html#p=8        Patient Education   HEALTHY PREGNANCY CARE: 10-14 WEEKS PREGNANT     By weeks 10 to 14 of your pregnancy, the placenta has formed inside your uterus. It may be possible to hear your baby's heartbeat with a doppler ultrasound device. Your baby's eyes can and do move. The arms and legs can bend.    The second trimester genetic screening tests for Down's Syndrome, Trisomy 18, and neural tube defects (which are known collectively as a quad screen) are done at 15 to 22 weeks. It's your choice whether to have these tests. You and your partner can talk to your midwife or physician about birth defects tests.    Consider breastfeeding for the healthiest way to feed your baby. Ask your midwife or physician for more information.     As your center of gravity and weight changes, use good body mechanics when changing positions and lifting. For example, use a straight back and your legs for support when lifting instead of bending over. Maintain good posture to prevent straining your muscles. Now is a good time to continue or restart your exercise program. Walking 30-60 minutes daily is an excellent way to keep fit. Yoga and swimming also offer many benefits.    The nausea and fatigue of early pregnancy have usually started to let up, so this is a good time to focus on nutrition. Consider attending a nutrition class. A healthy diet includes about 60 grams of protein each day (3-4 servings of dairy, 2-3 servings of  "meat/fish/poultry/nuts), 4-6 servings of whole grain foods, and 5-6 servings of fruits and vegetables. Remember to drink 6-8 glasses of water daily.    Watch for warning signs, such as     vaginal bleeding    fluid leaking from your vagina    severe abdominal pain    nausea and vomiting more than 4-5 times a day, or if you are unable to keep anything down    fever more than 100.4 degrees F.     Contact your midwife or physician at [facility] at Phone: [phone] if you have these or any other concerns. If it's after clinic hours, physician patients should call the [facility] at [phone] ([phone]); midwife patients should call their answering service at [phone].    How can you care for yourself at home?   You can refer to the Starting Out Right book or find it online at http://[email or web address]/images/stories/maternity/[email or web address]-[email or web address]-Right.pdf or http://www.[facility].org/images/stories/flipbooks/[facility]-starting-out-right/[facility]starting[facility]-right.html#p=8  You can sign up for a weekly parenting e-mail that gives support, tips and advice from health care professionals that starts with pregnancy and continues through the toddler years. To register, go to [email or web address] at any time during your pregnancy.         Patient Education   HEALTHY PREGNANCY CARE: 14 to 18 WEEKS PREGNANT    During this time, you may start to \"show,\" so that you look pregnant to people around you. You may also notice some changes in your skin, such as an increase in acne on your face. You may notice your heart pounding, a sharp stretching ache on either side of your lower abdomen (round ligament), and more vaginal discharge.     Your baby's nerves and muscles are maturing. Sex organs are recognizable. Your baby is now able to pass urine, and your baby's first stool (meconium) is starting to collect in his or her intestines. Hair is also beginning to grow on your baby's head. Your " baby is moving freely inside your uterus but you may not be able to feel it until 18-20 weeks.    Continue making healthy choices for your baby during pregnancy, including good nutrition, exercise and a safe environment free from smoking, alcohol and drugs.    Your genetic screening with a quad screen blood test may have been done today.    Watch for warning signs and contact your midwife or physician at the clinic with any concerns at [facility] at Phone: [phone]. For example, call about cramping, bleeding, abdominal pain, watery vaginal discharge, or if you are unable to keep fluids down for more than 24 hours due to vomiting.   If it's after clinic hours, physician patients should call the [facility] at [phone] ([phone]); midwife patients should call their answering service at [phone].    How can you care for yourself at home?   You can refer to the Starting Out Right book or find it online at http://[email or web address]/images/stories/maternity/[facility]-Starting-Out-Right.pdf or http://www.[facility].org/images/stories/flipbooks/[facility]-starting-out-right/[facility]-starting-out-right.html#p=8     You can sign up for a weekly parenting e-mail that gives support, tips and advice from health care professionals that starts with pregnancy and continues through the toddler years. To register, go to [email or web address] at any time during your pregnancy.

## 2021-07-02 ENCOUNTER — HOSPITAL ENCOUNTER (OUTPATIENT)
Dept: ULTRASOUND IMAGING | Facility: HOSPITAL | Age: 36
Discharge: HOME | End: 2021-07-02
Attending: FAMILY MEDICINE
Payer: COMMERCIAL

## 2021-07-02 DIAGNOSIS — Z3A.21 21 WEEKS GESTATION OF PREGNANCY: ICD-10-CM

## 2021-07-03 NOTE — ADDENDUM NOTE
Addendum Note by Kassandra Huber at 4/14/2020 11:40 AM     Author: Kassandra Huber Service: -- Author Type:     Filed: 4/15/2020  9:58 AM Encounter Date: 4/14/2020 Status: Signed    : Kassandra Huber ()    Addended by: KASSANDRA HUBER on: 4/15/2020 09:58 AM        Modules accepted: Orders

## 2021-07-05 ENCOUNTER — DOCUMENTATION ONLY (OUTPATIENT)
Dept: ADMINISTRATIVE | Facility: OTHER | Age: 36
End: 2021-07-05

## 2021-07-05 NOTE — PROGRESS NOTES
This encounter was created as part of manual pregnancy episode data conversion for the single EHR project. The following information (where applicable) was manually abstracted from the [employer] Epic instance on July 5, 2021: pregnancy episode name/date, dating, episode encounter linking, pregravid weight, number of fetuses, and pregnancy overview and plan.     Germán Cline RN   Clinical Informatics

## 2021-07-06 VITALS
OXYGEN SATURATION: 97 % | RESPIRATION RATE: 18 BRPM | HEART RATE: 90 BPM | WEIGHT: 198 LBS | BODY MASS INDEX: 30.11 KG/M2 | SYSTOLIC BLOOD PRESSURE: 104 MMHG | DIASTOLIC BLOOD PRESSURE: 62 MMHG | TEMPERATURE: 49.8 F

## 2021-07-14 PROBLEM — O20.0 THREATENED ABORTION IN EARLY PREGNANCY: Status: RESOLVED | Noted: 2020-01-16 | Resolved: 2020-02-05

## 2021-07-14 PROBLEM — Z34.90 PREGNANT: Status: RESOLVED | Noted: 2020-02-05 | Resolved: 2020-06-27

## 2021-08-03 ENCOUNTER — PRENATAL OFFICE VISIT (OUTPATIENT)
Dept: FAMILY MEDICINE | Facility: CLINIC | Age: 36
End: 2021-08-03
Payer: COMMERCIAL

## 2021-08-03 VITALS
WEIGHT: 199.75 LBS | HEART RATE: 93 BPM | TEMPERATURE: 97.9 F | BODY MASS INDEX: 30.37 KG/M2 | DIASTOLIC BLOOD PRESSURE: 70 MMHG | SYSTOLIC BLOOD PRESSURE: 102 MMHG | RESPIRATION RATE: 16 BRPM | OXYGEN SATURATION: 97 %

## 2021-08-03 DIAGNOSIS — Z33.1 PREGNANCY, INCIDENTAL: Primary | ICD-10-CM

## 2021-08-03 LAB
ALBUMIN UR-MCNC: NEGATIVE MG/DL
APPEARANCE UR: ABNORMAL
BACTERIA #/AREA URNS HPF: ABNORMAL /HPF
BILIRUB UR QL STRIP: NEGATIVE
COLOR UR AUTO: YELLOW
GLUCOSE 1H P 50 G GLC PO SERPL-MCNC: 87 MG/DL (ref 70–129)
GLUCOSE UR STRIP-MCNC: NEGATIVE MG/DL
HGB BLD-MCNC: 11.4 G/DL (ref 11.7–15.7)
HGB UR QL STRIP: NEGATIVE
KETONES UR STRIP-MCNC: NEGATIVE MG/DL
LEUKOCYTE ESTERASE UR QL STRIP: ABNORMAL
NITRATE UR QL: NEGATIVE
PH UR STRIP: 8.5 [PH] (ref 5–7)
RBC #/AREA URNS AUTO: ABNORMAL /HPF
SP GR UR STRIP: 1.02 (ref 1–1.03)
SQUAMOUS #/AREA URNS AUTO: ABNORMAL /LPF
UROBILINOGEN UR STRIP-ACNC: 0.2 E.U./DL
WBC #/AREA URNS AUTO: ABNORMAL /HPF

## 2021-08-03 PROCEDURE — 86780 TREPONEMA PALLIDUM: CPT | Performed by: FAMILY MEDICINE

## 2021-08-03 PROCEDURE — 81001 URINALYSIS AUTO W/SCOPE: CPT | Performed by: FAMILY MEDICINE

## 2021-08-03 PROCEDURE — 85018 HEMOGLOBIN: CPT | Performed by: FAMILY MEDICINE

## 2021-08-03 PROCEDURE — 36415 COLL VENOUS BLD VENIPUNCTURE: CPT | Performed by: FAMILY MEDICINE

## 2021-08-03 PROCEDURE — 82952 GTT-ADDED SAMPLES: CPT | Performed by: FAMILY MEDICINE

## 2021-08-03 PROCEDURE — 99207 PR PRENATAL VISIT: CPT | Performed by: FAMILY MEDICINE

## 2021-08-03 PROCEDURE — 86850 RBC ANTIBODY SCREEN: CPT | Performed by: FAMILY MEDICINE

## 2021-08-03 NOTE — LETTER
August 4, 2021      Cynthia LUIS ARMANDO Abdul  [address]  [place] [address]        Dear ,    We are writing to inform you of your test results.    Your hemoglobin is ok.  Your glucose is normal . Your urine is ok.      Resulted Orders   Glucose tolerance gest screen 1 hour   Result Value Ref Range    Glu Gest Screen 1hr 50g 87 70 - 129 mg/dL    Narrative    This is a screening test for Gestational Diabetes Mellitus.   If results are 130 mg/dL or greater, a Standard 100 gram Gestational  3 hour Glucose Tolerance should be performed.   Hemoglobin   Result Value Ref Range    Hemoglobin 11.4 (L) 11.7 - 15.7 g/dL   UA reflex to Microscopic and Culture   Result Value Ref Range    Color Urine Yellow Colorless, Straw, Light Yellow, Yellow    Appearance Urine Cloudy (A) Clear    Glucose Urine Negative Negative mg/dL    Bilirubin Urine Negative Negative    Ketones Urine Negative Negative mg/dL    Specific Gravity Urine 1.020 1.005 - 1.030    Blood Urine Negative Negative    pH Urine 8.5 (H) 5.0 - 7.0    Protein Albumin Urine Negative Negative mg/dL    Urobilinogen Urine 0.2 0.2, 1.0 E.U./dL    Nitrite Urine Negative Negative    Leukocyte Esterase Urine Small (A) Negative   Urine Microscopic   Result Value Ref Range    Bacteria Urine Moderate (A) None Seen /HPF    RBC Urine 0-2 0-2 /HPF /HPF    WBC Urine 0-5 0-5 /HPF /HPF    Squamous Epithelials Urine Moderate (A) None Seen /LPF    Narrative    Urine Culture not indicated       If you have any questions or concerns, please call the clinic at the number listed above.       Sincerely,      Faith Castillo MD

## 2021-08-03 NOTE — PROGRESS NOTES
She is doing well.  Denies any bleeding, cramping ,or lof.  Baby is moving well.    Denies any headaches or vision changes.    Denies any lower extremity edema.    Denies any ruq pain.  Denies itching.   We reviewed visitor restrictions at the hospital.    We reviewed covid vaccines in pregnancy.    We reviewed influenza vaccine .   We reviewed her ultrasound.    She is not eating well.  Reviewed eating small bites of food 3-4 times daily ,even if she is not hungry . Encouraged to take her pnv.

## 2021-08-04 LAB
ANTIBODY SCREEN: NEGATIVE
T PALLIDUM AB SER QL: NEGATIVE

## 2021-09-22 ENCOUNTER — TELEPHONE (OUTPATIENT)
Dept: FAMILY MEDICINE | Facility: CLINIC | Age: 36
End: 2021-09-22

## 2021-09-22 NOTE — TELEPHONE ENCOUNTER
Called pt and scheduled for PCP on Friday at 1015 am.  Thanks.    Date of Service: 09/26/2019    TYPE OF REPORT:  Outpatient visit note.    TIME:  10:00 to 10:45.    SESSION TYPE:  Individual, 60573.    CHIEF COMPLAINT:  \"I feel very sad lately and overwhelmed.\"    INTERVENTIONS:  Cognitive, insight, behavioral, supportive, mindfulness, motivational, and emotive therapy.    PATIENT LEVEL OF FUNCTIONING:  Moderate improvement.    DIAGNOSIS:  Bipolar 1, mixed.     The patient denies any current suicide, homicide or violence ideation and her current medication was reconciled in her Epic chart.    PROGRESS NOTE:  I met with Darlene alone in clinic office.  Just ended another relationship.  Very sad.  Did a lot of crying.  Is beginning to look more at self through mindfulness and triggers and how she sabotages her own relationships.  Reiterated to her how to set firm and consistent boundaries and not to share so much information in new relationships.    Response was  good.    PLAN:  Meet at the next available appointment.      Dictated By: Bethany Wilcox LPC  Signing Provider: Bethany Wilcox LPC    PH/SP2 ([id number])  DD: 09/30/2019 11:17:51 TD: 10/01/2019 07:57:58    Copy Sent To:

## 2021-09-22 NOTE — TELEPHONE ENCOUNTER
Reason for Call:  OB F/U, Requested Provider:  PCP    PCP: Faith Castillo    Reason for visit: Routine prenatal    Duration of symptoms: n/a    Have you been treated for this in the past? Yes    Additional comments: Patient request for F/U between 9:30 am and 12:30 pm Writer is unable to schedule at this time.    Can we leave a detailed message on this number? YES    Phone number patient can be reached at: Home number on file [phone] (home)    Best Time: any    Call taken on 9/22/2021 at 11:17 AM by Regine Mix

## 2021-09-24 ENCOUNTER — PRENATAL OFFICE VISIT (OUTPATIENT)
Dept: FAMILY MEDICINE | Facility: CLINIC | Age: 36
End: 2021-09-24
Payer: COMMERCIAL

## 2021-09-24 VITALS
TEMPERATURE: 97.9 F | HEART RATE: 89 BPM | SYSTOLIC BLOOD PRESSURE: 94 MMHG | WEIGHT: 197 LBS | DIASTOLIC BLOOD PRESSURE: 62 MMHG | BODY MASS INDEX: 29.95 KG/M2 | OXYGEN SATURATION: 99 %

## 2021-09-24 DIAGNOSIS — Z3A.13 13 WEEKS GESTATION OF PREGNANCY: ICD-10-CM

## 2021-09-24 DIAGNOSIS — R10.2 PELVIC PAIN IN PREGNANCY: ICD-10-CM

## 2021-09-24 DIAGNOSIS — Z34.90 PREGNANCY, UNSPECIFIED GESTATIONAL AGE: ICD-10-CM

## 2021-09-24 DIAGNOSIS — O26.899 PELVIC PAIN IN PREGNANCY: ICD-10-CM

## 2021-09-24 DIAGNOSIS — Z33.1 PREGNANCY, INCIDENTAL: Primary | ICD-10-CM

## 2021-09-24 DIAGNOSIS — J30.89 SEASONAL ALLERGIC RHINITIS DUE TO OTHER ALLERGIC TRIGGER: ICD-10-CM

## 2021-09-24 LAB
ALBUMIN UR-MCNC: NEGATIVE MG/DL
APPEARANCE UR: CLEAR
BACTERIA #/AREA URNS HPF: ABNORMAL /HPF
BILIRUB UR QL STRIP: NEGATIVE
COLOR UR AUTO: YELLOW
FASTING STATUS PATIENT QL REPORTED: NO
GLUCOSE BLD-MCNC: 102 MG/DL (ref 70–125)
GLUCOSE UR STRIP-MCNC: NEGATIVE MG/DL
HGB BLD-MCNC: 11.9 G/DL (ref 11.7–15.7)
HGB UR QL STRIP: NEGATIVE
KETONES UR STRIP-MCNC: ABNORMAL MG/DL
LEUKOCYTE ESTERASE UR QL STRIP: ABNORMAL
NITRATE UR QL: NEGATIVE
PH UR STRIP: 6.5 [PH] (ref 5–7)
RBC #/AREA URNS AUTO: ABNORMAL /HPF
SP GR UR STRIP: 1.02 (ref 1–1.03)
SQUAMOUS #/AREA URNS AUTO: ABNORMAL /LPF
UROBILINOGEN UR STRIP-ACNC: 0.2 E.U./DL
WBC #/AREA URNS AUTO: ABNORMAL /HPF

## 2021-09-24 PROCEDURE — 99213 OFFICE O/P EST LOW 20 MIN: CPT | Mod: 25 | Performed by: FAMILY MEDICINE

## 2021-09-24 PROCEDURE — 90471 IMMUNIZATION ADMIN: CPT | Performed by: FAMILY MEDICINE

## 2021-09-24 PROCEDURE — 85018 HEMOGLOBIN: CPT | Performed by: FAMILY MEDICINE

## 2021-09-24 PROCEDURE — 86780 TREPONEMA PALLIDUM: CPT | Performed by: FAMILY MEDICINE

## 2021-09-24 PROCEDURE — 87086 URINE CULTURE/COLONY COUNT: CPT | Performed by: FAMILY MEDICINE

## 2021-09-24 PROCEDURE — 90715 TDAP VACCINE 7 YRS/> IM: CPT | Performed by: FAMILY MEDICINE

## 2021-09-24 PROCEDURE — 36415 COLL VENOUS BLD VENIPUNCTURE: CPT | Performed by: FAMILY MEDICINE

## 2021-09-24 PROCEDURE — 82947 ASSAY GLUCOSE BLOOD QUANT: CPT | Performed by: FAMILY MEDICINE

## 2021-09-24 PROCEDURE — 99207 PR PRENATAL VISIT: CPT | Performed by: FAMILY MEDICINE

## 2021-09-24 PROCEDURE — 81001 URINALYSIS AUTO W/SCOPE: CPT | Performed by: FAMILY MEDICINE

## 2021-09-24 RX ORDER — LORATADINE 10 MG/1
10 TABLET ORAL DAILY
Qty: 90 TABLET | Refills: 1 | Status: ON HOLD | OUTPATIENT
Start: 2021-09-24 | End: 2021-11-02

## 2021-09-24 RX ORDER — FERROUS SULFATE 325(65) MG
325 TABLET ORAL DAILY
Qty: 90 TABLET | Refills: 3 | Status: SHIPPED | OUTPATIENT
Start: 2021-09-24 | End: 2023-04-28

## 2021-09-24 RX ORDER — FLUTICASONE PROPIONATE 50 MCG
1 SPRAY, SUSPENSION (ML) NASAL DAILY
Qty: 16 G | Refills: 1 | Status: SHIPPED | OUTPATIENT
Start: 2021-09-24 | End: 2023-04-28

## 2021-09-24 RX ORDER — PRENATAL VIT/IRON FUM/FOLIC AC 27MG-0.8MG
1 TABLET ORAL DAILY
Qty: 30 TABLET | Refills: 11 | Status: SHIPPED | OUTPATIENT
Start: 2021-09-24 | End: 2023-04-28

## 2021-09-24 NOTE — PROGRESS NOTES
She is doing well.  Denies any bleeding, cramping ,or lof.  Baby is moving well.    Denies any headaches or vision changes.    Denies any lower extremity edema.    Denies any ruq pain.  Denies itching. .  She was unable to come in due to lack of childcare.  Her kids are now back in school.    She has lost some weight, and is eating daily.  She eats only a small amount.  She sometimes eats twice daily .   She is drinking more water.    She has a lot of allergies.  A lot of nasal congestion and a lot of watery itchy eyes.  This is been present for the past couple of weeks.  She has not tried anything for it she was waiting to see what was appropriate in pregnancy.  O:  BP 94/62 (BP Location: Right arm, Patient Position: Sitting, Cuff Size: Adult Regular)   Pulse 89   Temp 97.9  F (36.6  C) (Oral)   Wt 89.4 kg (197 lb)   LMP 02/25/2021   SpO2 99%   BMI 29.95 kg/m     Weight loss is noted  Gen:  Nad, alertt  Nasal congestion noted.    Tenderness over the pubic bone.  This reproduces her pain.    A/p:  1. Pregnancy, incidental    - Hemoglobin; Future  - Treponema Abs w Reflex to RPR and Titer; Future  - Glucose; Future  - TDAP VACCINE (Adacel, Boostrix)  [[id number]]  - UA with Microscopic reflex to Culture - Clinic Collect  - Neck/Shoulder/Back/Abdomen Order for DME - ONLY FOR DME  - Hemoglobin  - Treponema Abs w Reflex to RPR and Titer  - Glucose  - Urine Microscopic  - Urine Culture    2. Pelvic pain in pregnancy    - Neck/Shoulder/Back/Abdomen Order for DME - ONLY FOR DME    3. Seasonal allergic rhinitis due to other allergic trigger    - loratadine (CLARITIN) 10 MG tablet; Take 1 tablet (10 mg) by mouth daily  Dispense: 90 tablet; Refill: 1  - fluticasone (FLONASE) 50 MCG/ACT nasal spray; Spray 1 spray into both nostrils daily  Dispense: 16 g; Refill: 1    4. 13 weeks gestation of pregnancy    - ferrous sulfate (FEROSUL) 325 (65 Fe) MG tablet; Take 1 tablet (325 mg) by mouth daily  Dispense: 90 tablet; Refill:  3    5. Pregnancy, unspecified gestational age    - Prenatal Vit-Fe Fumarate-FA (PRENATAL MULTIVITAMIN W/IRON) 27-0.8 MG tablet; Take 1 tablet by mouth daily  Dispense: 30 tablet; Refill: 11      Maternity belt given .     Will rx fluticasone and loratadine.

## 2021-09-25 LAB — T PALLIDUM AB SER QL: NEGATIVE

## 2021-09-26 LAB — BACTERIA UR CULT: NO GROWTH

## 2021-10-01 ENCOUNTER — PRENATAL OFFICE VISIT (OUTPATIENT)
Dept: FAMILY MEDICINE | Facility: CLINIC | Age: 36
End: 2021-10-01
Payer: COMMERCIAL

## 2021-10-01 VITALS
WEIGHT: 199.75 LBS | BODY MASS INDEX: 30.37 KG/M2 | DIASTOLIC BLOOD PRESSURE: 62 MMHG | OXYGEN SATURATION: 97 % | RESPIRATION RATE: 16 BRPM | TEMPERATURE: 97.6 F | HEART RATE: 94 BPM | SYSTOLIC BLOOD PRESSURE: 102 MMHG

## 2021-10-01 DIAGNOSIS — Z33.1 PREGNANCY, INCIDENTAL: Primary | ICD-10-CM

## 2021-10-01 PROCEDURE — 99207 PR PRENATAL VISIT: CPT | Performed by: FAMILY MEDICINE

## 2021-10-01 PROCEDURE — 87653 STREP B DNA AMP PROBE: CPT | Performed by: FAMILY MEDICINE

## 2021-10-01 NOTE — PROGRESS NOTES
She is doing well.  Denies any bleeding, cramping ,or lof.  Baby is moving well.    She was having a hard time today because I was running late and she thought her appt was at 1015, so she was here early.    Denies any headaches or vision changes.    Denies any lower extremity edema.    Denies any ruq pain.  Denies itching. .  Reviewed kick counts.    Reviewed when to go to the hospital.   gbs done today.

## 2021-10-02 LAB
GP B STREP DNA SPEC QL NAA+PROBE: NEGATIVE
PATIENT PENICILLIN, AMOXICILLIN, CEPHALOSPORINS ALLERGY: NO

## 2021-10-19 ENCOUNTER — PRENATAL OFFICE VISIT (OUTPATIENT)
Dept: FAMILY MEDICINE | Facility: CLINIC | Age: 36
End: 2021-10-19
Payer: COMMERCIAL

## 2021-10-19 VITALS
DIASTOLIC BLOOD PRESSURE: 64 MMHG | BODY MASS INDEX: 30.41 KG/M2 | TEMPERATURE: 98 F | OXYGEN SATURATION: 95 % | WEIGHT: 200 LBS | HEART RATE: 105 BPM | SYSTOLIC BLOOD PRESSURE: 98 MMHG

## 2021-10-19 DIAGNOSIS — R10.84 ABDOMINAL PAIN, GENERALIZED: Primary | ICD-10-CM

## 2021-10-19 DIAGNOSIS — Z33.1 PREGNANCY, INCIDENTAL: ICD-10-CM

## 2021-10-19 LAB
ALBUMIN UR-MCNC: NEGATIVE MG/DL
APPEARANCE UR: ABNORMAL
BACTERIA #/AREA URNS HPF: ABNORMAL /HPF
BILIRUB UR QL STRIP: NEGATIVE
COLOR UR AUTO: YELLOW
GLUCOSE UR STRIP-MCNC: NEGATIVE MG/DL
HGB UR QL STRIP: NEGATIVE
KETONES UR STRIP-MCNC: NEGATIVE MG/DL
LEUKOCYTE ESTERASE UR QL STRIP: ABNORMAL
NITRATE UR QL: NEGATIVE
PH UR STRIP: 6.5 [PH] (ref 5–7)
RBC #/AREA URNS AUTO: ABNORMAL /HPF
SP GR UR STRIP: 1.02 (ref 1–1.03)
SQUAMOUS #/AREA URNS AUTO: ABNORMAL /LPF
UROBILINOGEN UR STRIP-ACNC: 0.2 E.U./DL
WBC #/AREA URNS AUTO: ABNORMAL /HPF

## 2021-10-19 PROCEDURE — 99207 PR PRENATAL VISIT: CPT | Performed by: FAMILY MEDICINE

## 2021-10-19 PROCEDURE — 81001 URINALYSIS AUTO W/SCOPE: CPT | Performed by: FAMILY MEDICINE

## 2021-10-19 NOTE — PROGRESS NOTES
She is doing well.  Denies any bleeding, cramping ,or lof.  Baby is moving well.    Denies any headaches or vision changes.    Denies any lower extremity edema.    Denies any ruq pain.  Denies itching. .  She is having pain in her belly . She is drinking some water.  No unusual movement, exercise.  When she is siting, it is better. Worse with moving.  She is stooling, but not very well.  No changes to bowel or bladder habits.  No fevers . No blood in stool or urine.  This pain has been going on x 1 month or longer.  It is not getting worse.  No bruising.  It does not hurt worse when the baby moves.    No unusual vaginal discharge.    Will do an uai today.   She is eating better now .     O:  BP 98/64 (BP Location: Right arm, Patient Position: Sitting, Cuff Size: Adult Regular)   Pulse 105   Temp 98  F (36.7  C) (Oral)   Wt 90.7 kg (200 lb)   LMP 02/25/2021   SpO2 95%   BMI 30.41 kg/m    Gen: nad, alert  No bruising noted over her abdomen .   Fundus is mildly tender.  Umbilical hernia noted that is non tender.  No masses noted.  No rebound or guarding noted.  Mild contractions noted.  No suprapubic tenderness.      A/P:  1. Abdominal pain, generalized  Obtain US.  Reviewed signs of incarcerated hernia.  Reviewed when to seek care, if abd pain is worsening.  Or any signs of fever.    Check uai today.    - US OB > 14 Weeks; Future    2. Pregnancy, incidental  Reviewed membrane sweeping.  She will consider this for her next visit.  Declines covid vaccine today    - UA with Microscopic reflex to Culture - Clinic Collect  - US OB > 14 Weeks; Future  - Urine Microscopic

## 2021-10-28 ENCOUNTER — PRENATAL OFFICE VISIT (OUTPATIENT)
Dept: FAMILY MEDICINE | Facility: CLINIC | Age: 36
End: 2021-10-28
Payer: COMMERCIAL

## 2021-10-28 VITALS
OXYGEN SATURATION: 98 % | RESPIRATION RATE: 16 BRPM | DIASTOLIC BLOOD PRESSURE: 60 MMHG | TEMPERATURE: 98 F | HEART RATE: 95 BPM | WEIGHT: 199.8 LBS | BODY MASS INDEX: 30.28 KG/M2 | SYSTOLIC BLOOD PRESSURE: 110 MMHG | HEIGHT: 68 IN

## 2021-10-28 DIAGNOSIS — Z23 NEED FOR IMMUNIZATION AGAINST INFLUENZA: ICD-10-CM

## 2021-10-28 DIAGNOSIS — Z33.1 PREGNANCY, INCIDENTAL: Primary | ICD-10-CM

## 2021-10-28 PROCEDURE — 90472 IMMUNIZATION ADMIN EACH ADD: CPT | Performed by: FAMILY MEDICINE

## 2021-10-28 PROCEDURE — 99207 PR PRENATAL VISIT: CPT | Performed by: FAMILY MEDICINE

## 2021-10-28 PROCEDURE — 90686 IIV4 VACC NO PRSV 0.5 ML IM: CPT | Performed by: FAMILY MEDICINE

## 2021-10-28 ASSESSMENT — MIFFLIN-ST. JEOR: SCORE: 1644.66

## 2021-10-28 NOTE — PROGRESS NOTES
She is doing well.  Denies any bleeding, cramping ,or lof.  Baby is moving well.    Denies any headaches or vision changes.    Denies any lower extremity edema.    Denies any ruq pain.  Denies itching. .  Flu vaccine given today.    Membranes swept today.   Baby has dropped in the last week.

## 2021-10-31 ENCOUNTER — HOSPITAL ENCOUNTER (INPATIENT)
Facility: HOSPITAL | Age: 36
LOS: 2 days | Discharge: HOME | End: 2021-11-02
Attending: FAMILY MEDICINE | Admitting: FAMILY MEDICINE
Payer: COMMERCIAL

## 2021-10-31 PROBLEM — Z34.90 PREGNANT: Status: ACTIVE | Noted: 2021-10-31

## 2021-10-31 LAB
ABO/RH(D): NORMAL
ANTIBODY SCREEN: NEGATIVE
BASOPHILS # BLD AUTO: 0 10E3/UL (ref 0–0.2)
BASOPHILS NFR BLD AUTO: 1 %
EOSINOPHIL # BLD AUTO: 0.5 10E3/UL (ref 0–0.7)
EOSINOPHIL NFR BLD AUTO: 6 %
ERYTHROCYTE [DISTWIDTH] IN BLOOD BY AUTOMATED COUNT: 15.8 % (ref 10–15)
HCT VFR BLD AUTO: 38.5 % (ref 35–47)
HGB BLD-MCNC: 12.4 G/DL (ref 11.7–15.7)
IMM GRANULOCYTES # BLD: 0.1 10E3/UL
IMM GRANULOCYTES NFR BLD: 2 %
LYMPHOCYTES # BLD AUTO: 1.1 10E3/UL (ref 0.8–5.3)
LYMPHOCYTES NFR BLD AUTO: 14 %
MCH RBC QN AUTO: 28.4 PG (ref 26.5–33)
MCHC RBC AUTO-ENTMCNC: 32.2 G/DL (ref 31.5–36.5)
MCV RBC AUTO: 88 FL (ref 78–100)
MONOCYTES # BLD AUTO: 0.4 10E3/UL (ref 0–1.3)
MONOCYTES NFR BLD AUTO: 5 %
NEUTROPHILS # BLD AUTO: 6 10E3/UL (ref 1.6–8.3)
NEUTROPHILS NFR BLD AUTO: 72 %
NRBC # BLD AUTO: 0 10E3/UL
NRBC BLD AUTO-RTO: 0 /100
PLATELET # BLD AUTO: 152 10E3/UL (ref 150–450)
RBC # BLD AUTO: 4.37 10E6/UL (ref 3.8–5.2)
SARS-COV-2 RNA RESP QL NAA+PROBE: NEGATIVE
SPECIMEN EXPIRATION DATE: NORMAL
WBC # BLD AUTO: 8 10E3/UL (ref 4–11)

## 2021-10-31 PROCEDURE — 59400 OBSTETRICAL CARE: CPT | Performed by: FAMILY MEDICINE

## 2021-10-31 PROCEDURE — 10907ZC DRAINAGE OF AMNIOTIC FLUID, THERAPEUTIC FROM PRODUCTS OF CONCEPTION, VIA NATURAL OR ARTIFICIAL OPENING: ICD-10-PCS | Performed by: FAMILY MEDICINE

## 2021-10-31 PROCEDURE — 722N000001 HC LABOR CARE VAGINAL DELIVERY SINGLE

## 2021-10-31 PROCEDURE — 87635 SARS-COV-2 COVID-19 AMP PRB: CPT | Performed by: FAMILY MEDICINE

## 2021-10-31 PROCEDURE — 250N000009 HC RX 250: Performed by: FAMILY MEDICINE

## 2021-10-31 PROCEDURE — 250N000013 HC RX MED GY IP 250 OP 250 PS 637: Performed by: FAMILY MEDICINE

## 2021-10-31 PROCEDURE — 85025 COMPLETE CBC W/AUTO DIFF WBC: CPT | Performed by: FAMILY MEDICINE

## 2021-10-31 PROCEDURE — 250N000011 HC RX IP 250 OP 636: Performed by: FAMILY MEDICINE

## 2021-10-31 PROCEDURE — 36415 COLL VENOUS BLD VENIPUNCTURE: CPT | Performed by: FAMILY MEDICINE

## 2021-10-31 PROCEDURE — 86780 TREPONEMA PALLIDUM: CPT | Performed by: FAMILY MEDICINE

## 2021-10-31 PROCEDURE — 86900 BLOOD TYPING SEROLOGIC ABO: CPT | Performed by: FAMILY MEDICINE

## 2021-10-31 PROCEDURE — 120N000001 HC R&B MED SURG/OB

## 2021-10-31 RX ORDER — MISOPROSTOL 200 UG/1
TABLET ORAL
Status: DISCONTINUED
Start: 2021-10-31 | End: 2021-10-31 | Stop reason: WASHOUT

## 2021-10-31 RX ORDER — NALOXONE HYDROCHLORIDE 0.4 MG/ML
0.4 INJECTION, SOLUTION INTRAMUSCULAR; INTRAVENOUS; SUBCUTANEOUS
Status: DISCONTINUED | OUTPATIENT
Start: 2021-10-31 | End: 2021-11-02 | Stop reason: HOSPADM

## 2021-10-31 RX ORDER — PROCHLORPERAZINE 25 MG
25 SUPPOSITORY, RECTAL RECTAL EVERY 12 HOURS PRN
Status: DISCONTINUED | OUTPATIENT
Start: 2021-10-31 | End: 2021-10-31 | Stop reason: HOSPADM

## 2021-10-31 RX ORDER — KETOROLAC TROMETHAMINE 30 MG/ML
30 INJECTION, SOLUTION INTRAMUSCULAR; INTRAVENOUS
Status: DISCONTINUED | OUTPATIENT
Start: 2021-10-31 | End: 2021-11-02 | Stop reason: HOSPADM

## 2021-10-31 RX ORDER — ONDANSETRON 4 MG/1
4 TABLET, ORALLY DISINTEGRATING ORAL EVERY 6 HOURS PRN
Status: DISCONTINUED | OUTPATIENT
Start: 2021-10-31 | End: 2021-10-31

## 2021-10-31 RX ORDER — ONDANSETRON 2 MG/ML
4 INJECTION INTRAMUSCULAR; INTRAVENOUS EVERY 6 HOURS PRN
Status: DISCONTINUED | OUTPATIENT
Start: 2021-10-31 | End: 2021-10-31

## 2021-10-31 RX ORDER — BISACODYL 10 MG
10 SUPPOSITORY, RECTAL RECTAL DAILY PRN
Status: DISCONTINUED | OUTPATIENT
Start: 2021-10-31 | End: 2021-11-02 | Stop reason: HOSPADM

## 2021-10-31 RX ORDER — PROCHLORPERAZINE MALEATE 10 MG
10 TABLET ORAL EVERY 6 HOURS PRN
Status: DISCONTINUED | OUTPATIENT
Start: 2021-10-31 | End: 2021-10-31

## 2021-10-31 RX ORDER — HYDROCODONE BITARTRATE AND ACETAMINOPHEN 5; 325 MG/1; MG/1
1 TABLET ORAL EVERY 6 HOURS PRN
Status: DISCONTINUED | OUTPATIENT
Start: 2021-10-31 | End: 2021-11-02 | Stop reason: HOSPADM

## 2021-10-31 RX ORDER — METOCLOPRAMIDE 10 MG/1
10 TABLET ORAL EVERY 6 HOURS PRN
Status: DISCONTINUED | OUTPATIENT
Start: 2021-10-31 | End: 2021-10-31 | Stop reason: HOSPADM

## 2021-10-31 RX ORDER — OXYTOCIN 10 [USP'U]/ML
INJECTION, SOLUTION INTRAMUSCULAR; INTRAVENOUS
Status: DISCONTINUED
Start: 2021-10-31 | End: 2021-10-31 | Stop reason: WASHOUT

## 2021-10-31 RX ORDER — DOCUSATE SODIUM 100 MG/1
100 CAPSULE, LIQUID FILLED ORAL DAILY
Status: DISCONTINUED | OUTPATIENT
Start: 2021-10-31 | End: 2021-11-02 | Stop reason: HOSPADM

## 2021-10-31 RX ORDER — NALOXONE HYDROCHLORIDE 0.4 MG/ML
0.2 INJECTION, SOLUTION INTRAMUSCULAR; INTRAVENOUS; SUBCUTANEOUS
Status: DISCONTINUED | OUTPATIENT
Start: 2021-10-31 | End: 2021-11-02 | Stop reason: HOSPADM

## 2021-10-31 RX ORDER — LIDOCAINE HYDROCHLORIDE 10 MG/ML
INJECTION, SOLUTION EPIDURAL; INFILTRATION; INTRACAUDAL; PERINEURAL
Status: DISCONTINUED
Start: 2021-10-31 | End: 2021-10-31 | Stop reason: WASHOUT

## 2021-10-31 RX ORDER — IBUPROFEN 600 MG/1
600 TABLET, FILM COATED ORAL
Status: DISCONTINUED | OUTPATIENT
Start: 2021-10-31 | End: 2021-11-02 | Stop reason: HOSPADM

## 2021-10-31 RX ORDER — OXYTOCIN/0.9 % SODIUM CHLORIDE 30/500 ML
100-340 PLASTIC BAG, INJECTION (ML) INTRAVENOUS CONTINUOUS PRN
Status: DISCONTINUED | OUTPATIENT
Start: 2021-10-31 | End: 2021-11-02 | Stop reason: HOSPADM

## 2021-10-31 RX ORDER — OXYTOCIN 10 [USP'U]/ML
10 INJECTION, SOLUTION INTRAMUSCULAR; INTRAVENOUS
Status: DISCONTINUED | OUTPATIENT
Start: 2021-10-31 | End: 2021-10-31 | Stop reason: HOSPADM

## 2021-10-31 RX ORDER — HYDROCORTISONE 2.5 %
CREAM (GRAM) TOPICAL 3 TIMES DAILY PRN
Status: DISCONTINUED | OUTPATIENT
Start: 2021-10-31 | End: 2021-11-02 | Stop reason: HOSPADM

## 2021-10-31 RX ORDER — LIDOCAINE HYDROCHLORIDE 20 MG/ML
SOLUTION OROPHARYNGEAL
Status: DISCONTINUED
Start: 2021-10-31 | End: 2021-10-31 | Stop reason: WASHOUT

## 2021-10-31 RX ORDER — MISOPROSTOL 200 UG/1
800 TABLET ORAL
Status: DISCONTINUED | OUTPATIENT
Start: 2021-10-31 | End: 2021-10-31 | Stop reason: HOSPADM

## 2021-10-31 RX ORDER — CARBOPROST TROMETHAMINE 250 UG/ML
250 INJECTION, SOLUTION INTRAMUSCULAR
Status: DISCONTINUED | OUTPATIENT
Start: 2021-10-31 | End: 2021-10-31 | Stop reason: HOSPADM

## 2021-10-31 RX ORDER — METOCLOPRAMIDE HYDROCHLORIDE 5 MG/ML
10 INJECTION INTRAMUSCULAR; INTRAVENOUS EVERY 6 HOURS PRN
Status: DISCONTINUED | OUTPATIENT
Start: 2021-10-31 | End: 2021-10-31 | Stop reason: HOSPADM

## 2021-10-31 RX ORDER — IBUPROFEN 800 MG/1
800 TABLET, FILM COATED ORAL EVERY 6 HOURS PRN
Status: DISCONTINUED | OUTPATIENT
Start: 2021-10-31 | End: 2021-11-02 | Stop reason: HOSPADM

## 2021-10-31 RX ORDER — ACETAMINOPHEN 325 MG/1
650 TABLET ORAL EVERY 4 HOURS PRN
Status: DISCONTINUED | OUTPATIENT
Start: 2021-10-31 | End: 2021-11-02 | Stop reason: HOSPADM

## 2021-10-31 RX ORDER — PROCHLORPERAZINE 25 MG
25 SUPPOSITORY, RECTAL RECTAL EVERY 12 HOURS PRN
Status: DISCONTINUED | OUTPATIENT
Start: 2021-10-31 | End: 2021-10-31

## 2021-10-31 RX ORDER — METHYLERGONOVINE MALEATE 0.2 MG/ML
200 INJECTION INTRAVENOUS
Status: DISCONTINUED | OUTPATIENT
Start: 2021-10-31 | End: 2021-10-31 | Stop reason: HOSPADM

## 2021-10-31 RX ORDER — METOCLOPRAMIDE HYDROCHLORIDE 5 MG/ML
10 INJECTION INTRAMUSCULAR; INTRAVENOUS EVERY 6 HOURS PRN
Status: DISCONTINUED | OUTPATIENT
Start: 2021-10-31 | End: 2021-10-31

## 2021-10-31 RX ORDER — PROCHLORPERAZINE MALEATE 10 MG
10 TABLET ORAL EVERY 6 HOURS PRN
Status: DISCONTINUED | OUTPATIENT
Start: 2021-10-31 | End: 2021-10-31 | Stop reason: HOSPADM

## 2021-10-31 RX ORDER — NALOXONE HYDROCHLORIDE 0.4 MG/ML
0.4 INJECTION, SOLUTION INTRAMUSCULAR; INTRAVENOUS; SUBCUTANEOUS
Status: DISCONTINUED | OUTPATIENT
Start: 2021-10-31 | End: 2021-10-31 | Stop reason: HOSPADM

## 2021-10-31 RX ORDER — MODIFIED LANOLIN
OINTMENT (GRAM) TOPICAL
Status: DISCONTINUED | OUTPATIENT
Start: 2021-10-31 | End: 2021-11-02 | Stop reason: HOSPADM

## 2021-10-31 RX ORDER — ONDANSETRON 2 MG/ML
4 INJECTION INTRAMUSCULAR; INTRAVENOUS EVERY 6 HOURS PRN
Status: DISCONTINUED | OUTPATIENT
Start: 2021-10-31 | End: 2021-10-31 | Stop reason: HOSPADM

## 2021-10-31 RX ORDER — NALOXONE HYDROCHLORIDE 0.4 MG/ML
0.2 INJECTION, SOLUTION INTRAMUSCULAR; INTRAVENOUS; SUBCUTANEOUS
Status: DISCONTINUED | OUTPATIENT
Start: 2021-10-31 | End: 2021-10-31 | Stop reason: HOSPADM

## 2021-10-31 RX ORDER — OXYTOCIN 10 [USP'U]/ML
10 INJECTION, SOLUTION INTRAMUSCULAR; INTRAVENOUS
Status: DISCONTINUED | OUTPATIENT
Start: 2021-10-31 | End: 2021-11-02 | Stop reason: HOSPADM

## 2021-10-31 RX ORDER — OXYTOCIN/0.9 % SODIUM CHLORIDE 30/500 ML
340 PLASTIC BAG, INJECTION (ML) INTRAVENOUS CONTINUOUS PRN
Status: COMPLETED | OUTPATIENT
Start: 2021-10-31 | End: 2021-10-31

## 2021-10-31 RX ORDER — MISOPROSTOL 200 UG/1
400 TABLET ORAL
Status: DISCONTINUED | OUTPATIENT
Start: 2021-10-31 | End: 2021-10-31 | Stop reason: HOSPADM

## 2021-10-31 RX ORDER — METOCLOPRAMIDE 10 MG/1
10 TABLET ORAL EVERY 6 HOURS PRN
Status: DISCONTINUED | OUTPATIENT
Start: 2021-10-31 | End: 2021-10-31

## 2021-10-31 RX ORDER — ONDANSETRON 4 MG/1
4 TABLET, ORALLY DISINTEGRATING ORAL EVERY 6 HOURS PRN
Status: DISCONTINUED | OUTPATIENT
Start: 2021-10-31 | End: 2021-10-31 | Stop reason: HOSPADM

## 2021-10-31 RX ORDER — SODIUM CHLORIDE, SODIUM LACTATE, POTASSIUM CHLORIDE, CALCIUM CHLORIDE 600; 310; 30; 20 MG/100ML; MG/100ML; MG/100ML; MG/100ML
INJECTION, SOLUTION INTRAVENOUS CONTINUOUS PRN
Status: DISCONTINUED | OUTPATIENT
Start: 2021-10-31 | End: 2021-11-02 | Stop reason: HOSPADM

## 2021-10-31 RX ADMIN — ACETAMINOPHEN 650 MG: 325 TABLET ORAL at 22:19

## 2021-10-31 RX ADMIN — TRANEXAMIC ACID 1 G: 100 INJECTION, SOLUTION INTRAVENOUS at 17:09

## 2021-10-31 RX ADMIN — Medication 340 ML/HR: at 20:08

## 2021-10-31 RX ADMIN — HYDROCODONE BITARTRATE AND ACETAMINOPHEN 1 TABLET: 5; 325 TABLET ORAL at 23:38

## 2021-10-31 RX ADMIN — KETOROLAC TROMETHAMINE 30 MG: 30 INJECTION, SOLUTION INTRAMUSCULAR at 20:42

## 2021-10-31 ASSESSMENT — MIFFLIN-ST. JEOR: SCORE: 1641.16

## 2021-10-31 NOTE — PROGRESS NOTES
Cynthia, , presents at 40 0/7 weeks with regular labor contractions since 1400.  She reports contractions initiated around 5 am but irregular until intensity and regularity changed around 2 pm. [place] [facility] [place]20.  Denies any vaginal bleeding or leaking of fluids.  Reports last cervical exam in clinic 1 cm.  SVE by Rn 4-5 cm/70%/-2 station with BOW and unable to examine for sutures.  RN requested bedside US  for confirmation of vertex.  Dr Davis on unit and confirmed vertex with ultrasound.  IV Saline lock started by lynne BOLAND.      Dr Castillo called by charge DEBBY BOLAND and updated of pateient arrival and status.

## 2021-10-31 NOTE — PLAN OF CARE
Problem: Adult Inpatient Plan of Care  Goal: Plan of Care Review  Outcome: Improving  Flowsheets (Taken 10/31/2021 1805)  Plan of Care Reviewed With: patient  Progress: improving     Problem: Change in Fetal Wellbeing (Labor)  Goal: Stable Fetal Wellbeing  Outcome: Improving     Problem: Delayed Labor Progression (Labor)  Goal: Effective Progression to Delivery  Outcome: Improving     Problem: Labor Pain (Labor)  Goal: Acceptable Pain Control  Outcome: Improving     Problem: Uterine Tachysystole (Labor)  Goal: Normal Uterine Contraction Pattern  Outcome: Improving     Plan of care reviewed with patient.  Anticipate normal vaginal delivery with normal labor progression.  Fetal monitoring assessed and cervical exams as indicated.

## 2021-10-31 NOTE — H&P
Maternal Admission H&P  [facility]  Date of Admission: 10/31/2021  Date of Service: 10/31/2021    Name      Cynthia Abdul         1985  MRN       [id number]  PCP        Faith Castillo at [facility], [phone].    ________________________________________________________________________    Assessment and Plan:  36 year old  at 40w0d.    Baby AGA. Anticipate .    Group B strep: negative  ________________________________________________________________________    Chief Complaint: active labor management.    HPI: Cynthia Abdul is a 36 year old woman at 40w0d, based on an Estimated Date of Delivery: Oct 31, 2021. She presents with active labor.    Patient Active Problem List    Diagnosis Date Noted     Pregnant 10/31/2021     Priority: High     Postpartum hemorrhage, unspecified type      Priority: Medium     Varicose veins during pregnancy, antepartum 2020     Priority: Low      OB History    Para Term  AB Living   7 6 6 0 0 6   SAB TAB Ectopic Multiple Live Births   0 0 0 0 6      # Outcome Date GA Lbr Librado/2nd Weight Sex Delivery Anes PTL Lv   7 Current            6 Term 20 41w0d 07:28 / 00:07 3.38 kg (7 lb 7.2 oz) M Vag-Spont None N GIO      Name: TRUE ABDUL-CYNTHIA      Apgar1: 8  Apgar5: 9   5 Term         GIO   4 Term         GOI   3 Term         GIO   2 Term         GIO   1 Term         GIO     Review of Systems Negative except what is noted in HPI.   Past Medical History:   Diagnosis Date     Seasonal allergies       Past Surgical History:   Procedure Laterality Date     NO PAST SURGERIES     Patient has no known allergies.  Family History   Problem Relation Age of Onset     No Known Problems Mother      No Known Problems Father      Social History     Socioeconomic History     Marital status:      Spouse name: Whitley Bach     Number of children: 6     Years of education: Not on file      Highest education level: Not on file   Occupational History     Not on file   Tobacco Use     Smoking status: Never Smoker     Smokeless tobacco: Never Used   Substance and Sexual Activity     Alcohol use: Never     Drug use: Never     Sexual activity: Yes     Partners: Male     Birth control/protection: None   Other Topics Concern     Not on file   Social History Narrative     Not on file     Social Determinants of Health     Financial Resource Strain:      Difficulty of Paying Living Expenses:    Food Insecurity:      Worried About Running Out of Food in the Last Year:      Ran Out of Food in the Last Year:    Transportation Needs:      Lack of Transportation (Medical):      Lack of Transportation (Non-Medical):    Physical Activity:      Days of Exercise per Week:      Minutes of Exercise per Session:    Stress:      Feeling of Stress :    Social Connections:      Frequency of Communication with Friends and Family:      Frequency of Social Gatherings with Friends and Family:      Attends Jew Services:      Active Member of Clubs or Organizations:      Attends Club or Organization Meetings:      Marital Status:    Intimate Partner Violence:      Fear of Current or Ex-Partner:      Emotionally Abused:      Physically Abused:      Sexually Abused:      Prior to Admission Medication List  Medications Prior to Admission   Medication Sig Dispense Refill Last Dose     calcium, as carbonate, (TUMS) 200 mg calcium (500 mg) chewable tablet [CALCIUM, AS CARBONATE, (TUMS) 200 MG CALCIUM (500 MG) CHEWABLE TABLET] Chew 1 tablet (200 mg total) daily. 90 tablet 3 Past Month at Unknown time     carboxymethylcellulose (REFRESH LIQUIGEL) 1 % ophthalmic solution [CARBOXYMETHYLCELLULOSE (REFRESH LIQUIGEL) 1 % OPHTHALMIC SOLUTION] Place 1 drop in both eyes as needed throughout the day. 30 mL 12 10/30/2021 at Unknown time     cetirizine (ZYRTEC) 5 MG tablet [CETIRIZINE (ZYRTEC) 5 MG TABLET] TAKE 1 TABLET BY MOUTH EVERY DAY 30  tablet 11 10/30/2021 at Unknown time     cholecalciferol, vitamin D3, (VITAMIN D3) 50 mcg (2,000 unit) Tab [CHOLECALCIFEROL, VITAMIN D3, (VITAMIN D3) 50 MCG (2,000 UNIT) TAB] Take 1 tablet (2,000 Units total) by mouth daily. 90 tablet 4 10/30/2021 at Unknown time     doxylamine (UNISOM) 25 mg tablet [DOXYLAMINE (UNISOM) 25 MG TABLET] Take 1 tablet (25 mg total) by mouth at bedtime as needed for sleep. 60 tablet 3 More than a month at Unknown time     ferrous sulfate (FEROSUL) 325 (65 Fe) MG tablet Take 1 tablet (325 mg) by mouth daily 90 tablet 3 10/30/2021 at Unknown time     fluticasone (FLONASE) 50 MCG/ACT nasal spray Spray 1 spray into both nostrils daily 16 g 1 Past Month at Unknown time     inhalational spacing device (AEROCHAMBER MV) Spcr [INHALATIONAL SPACING DEVICE (AEROCHAMBER MV) SPCR] Use with albuterol inhaler 1 each 0 Unknown at Unknown time     ketotifen (ZADITOR/ZYRTEC ITCHY EYES) 0.025 % (0.035 %) ophthalmic solution [KETOTIFEN (ZADITOR/ZYRTEC ITCHY EYES) 0.025 % (0.035 %) OPHTHALMIC SOLUTION] Apply 1 drop to each eye daily. 10 mL 2 10/30/2021 at Unknown time     loratadine (CLARITIN) 10 MG tablet Take 1 tablet (10 mg) by mouth daily 90 tablet 1 10/30/2021 at Unknown time     Prenatal Vit-Fe Fumarate-FA (PRENATAL MULTIVITAMIN W/IRON) 27-0.8 MG tablet Take 1 tablet by mouth daily 30 tablet 11 10/30/2021 at Unknown time     pyridoxine, vitamin B6, (VITAMIN B-6) 50 MG tablet [PYRIDOXINE, VITAMIN B6, (VITAMIN B-6) 50 MG TABLET] Take 1 tablet (50 mg total) by mouth 3 (three) times a day. 90 tablet 3 10/30/2021 at Unknown time     senna (SENOKOT) 8.6 mg tablet [SENNA (SENOKOT) 8.6 MG TABLET] Take 1 tablet by mouth 2 (two) times a day as needed for constipation. 60 tablet 0 More than a month at Unknown time      Allergies  No Known Allergies  Immunization History   Administered Date(s) Administered     Influenza Vaccine IM > 6 months Valent IIV4 (Kelly,Fluzone) 02/06/2020, 10/28/2021     Tdap  (Adacel,Boostrix) 04/14/2020, 09/24/2021      Physical Exam  Patient Vitals for the past 24 hrs:   Temp Temp src Resp   10/31/21 1536 97.8  F (36.6  C) Oral 16     Wt Readings from Last 1 Encounters:   10/28/21 90.6 kg (199 lb 12.8 oz)   Prepregnancy: 89.4 kg (197 lb), BMI 29.96. Total gain: 1.27 kg (2 lb 12.8 oz) (expected gain: 7 kg (15 lb)-11.5 kg (25 lb)).    HEART: RRR, no murmur  LUNGS: CTA bilaterally  Fetal Heart Tones: Baseline 130 bpm, variability moderate (6-25 bpm), early decelerations. Reactive.  CONTRACTIONS:  regular, every 2-4 minutes.  CERVIX: dilation 5-6 cm per RN  FLUID: None noted.  Fetal Presentation: vertex.  Labs    Group B Strep PCR   Date Value Ref Range Status   10/01/2021 Negative Negative Final     Comment:     Presumed negative for Streptococcus agalactiae (Group B Streptococcus) or the number of organisms may be below the limit of detection of the assay.      Antibody Screen   Date Value Ref Range Status   08/03/2021 Negative  Final     Hepatitis B Surface Antigen   Date Value Ref Range Status   05/28/2021 Negative Negative Final     Neisseria gonorrhoeae   Date Value Ref Range Status   01/16/2020 Negative Negative Final     Hemoglobin   Date Value Ref Range Status   10/31/2021 12.4 11.7 - 15.7 g/dL Final      Admission on 10/31/2021   Component Date Value Ref Range Status     WBC Count 10/31/2021 8.0  4.0 - 11.0 10e3/uL Final     RBC Count 10/31/2021 4.37  3.80 - 5.20 10e6/uL Final     Hemoglobin 10/31/2021 12.4  11.7 - 15.7 g/dL Final     Hematocrit 10/31/2021 38.5  35.0 - 47.0 % Final     MCV 10/31/2021 88  78 - 100 fL Final     MCH 10/31/2021 28.4  26.5 - 33.0 pg Final     MCHC 10/31/2021 32.2  31.5 - 36.5 g/dL Final     RDW 10/31/2021 15.8* 10.0 - 15.0 % Final     Platelet Count 10/31/2021 152  150 - 450 10e3/uL Final     % Neutrophils 10/31/2021 72  % Final     % Lymphocytes 10/31/2021 14  % Final     % Monocytes 10/31/2021 5  % Final     % Eosinophils 10/31/2021 6  % Final     %  Basophils 10/31/2021 1  % Final     % Immature Granulocytes 10/31/2021 2  % Final     NRBCs per 100 WBC 10/31/2021 0  <1 /100 Final     Absolute Neutrophils 10/31/2021 6.0  1.6 - 8.3 10e3/uL Final     Absolute Lymphocytes 10/31/2021 1.1  0.8 - 5.3 10e3/uL Final     Absolute Monocytes 10/31/2021 0.4  0.0 - 1.3 10e3/uL Final     Absolute Eosinophils 10/31/2021 0.5  0.0 - 0.7 10e3/uL Final     Absolute Basophils 10/31/2021 0.0  0.0 - 0.2 10e3/uL Final     Absolute Immature Granulocytes 10/31/2021 0.1* <=0.0 10e3/uL Final     Absolute NRBCs 10/31/2021 0.0  10e3/uL Final        Completed by:   Qing Ramsey MD  [facility]  10/31/2021 5:30 PM   used: Not needed.

## 2021-10-31 NOTE — PROGRESS NOTES
COVID test collection performed earlier, lab called and said specimen unable to read.  Discussed with patient, declines recollection at this time.  Dr Ramsey notified and has reviewed with patient. Patient agreeable to reswab after infant delivered

## 2021-11-01 LAB — T PALLIDUM AB SER QL: NONREACTIVE

## 2021-11-01 PROCEDURE — 250N000013 HC RX MED GY IP 250 OP 250 PS 637: Performed by: FAMILY MEDICINE

## 2021-11-01 PROCEDURE — 120N000001 HC R&B MED SURG/OB

## 2021-11-01 PROCEDURE — 99207 PR SUBSEQUENT HOSPITAL CARE FOR MOTHER, 15 MINUTES: CPT | Performed by: FAMILY MEDICINE

## 2021-11-01 RX ORDER — HYDROXYZINE HYDROCHLORIDE 25 MG/1
25 TABLET, FILM COATED ORAL EVERY 6 HOURS PRN
Status: DISCONTINUED | OUTPATIENT
Start: 2021-11-01 | End: 2021-11-02 | Stop reason: HOSPADM

## 2021-11-01 RX ADMIN — IBUPROFEN 800 MG: 800 TABLET, FILM COATED ORAL at 09:27

## 2021-11-01 RX ADMIN — HYDROXYZINE HYDROCHLORIDE 25 MG: 25 TABLET, FILM COATED ORAL at 20:08

## 2021-11-01 RX ADMIN — HYDROCODONE BITARTRATE AND ACETAMINOPHEN 1 TABLET: 5; 325 TABLET ORAL at 13:50

## 2021-11-01 RX ADMIN — IBUPROFEN 800 MG: 800 TABLET, FILM COATED ORAL at 02:58

## 2021-11-01 RX ADMIN — DOCUSATE SODIUM 100 MG: 100 CAPSULE, LIQUID FILLED ORAL at 09:26

## 2021-11-01 RX ADMIN — HYDROCODONE BITARTRATE AND ACETAMINOPHEN 1 TABLET: 5; 325 TABLET ORAL at 20:08

## 2021-11-01 RX ADMIN — HYDROCODONE BITARTRATE AND ACETAMINOPHEN 1 TABLET: 5; 325 TABLET ORAL at 07:58

## 2021-11-01 RX ADMIN — IBUPROFEN 800 MG: 800 TABLET, FILM COATED ORAL at 17:12

## 2021-11-01 RX ADMIN — HYDROXYZINE HYDROCHLORIDE 25 MG: 25 TABLET, FILM COATED ORAL at 06:00

## 2021-11-01 NOTE — PLAN OF CARE
Problem: Bleeding (Postpartum Vaginal Delivery)  Goal: Hemostasis  Outcome: Improving     Problem: Adjustment to Role Transition (Postpartum Vaginal Delivery)  Goal: Successful Maternal Role Transition  Outcome: Improving     Bonding well with infant, breastfeeding well.  Bleeding stable, up to bathroom to void without difficulty.

## 2021-11-01 NOTE — PROGRESS NOTES
Dr Castillo paged due to patient pain level.  Order for Vicodin received after reviewing patient status and medications toradol and tylenol already administered.

## 2021-11-01 NOTE — PLAN OF CARE
Problem: Adjustment to Role Transition (Postpartum Vaginal Delivery)  Goal: Successful Maternal Role Transition  Outcome: Improving     Mom is independent with infant cares, doing well      Problem: Pain (Postpartum Vaginal Delivery)  Goal: Acceptable Pain Control  Outcome: Improving     Mom is using the aqua k pad and has pain meds on board      Problem: Bleeding (Postpartum Vaginal Delivery)  Goal: Hemostasis  Outcome: Improving     Bleeding appropriate

## 2021-11-01 NOTE — L&D DELIVERY NOTE
Delivery Summary  [facility]  Date of Service: 10/31/2021    Name      Cynthia Abdul         1985  MRN       [id number]  PCP        Faith Ramon at [facility], [phone].    DELIVERY NARRATIVE    On 10/31/2021 Cynthia Abdul delivered a viable female infant at 40w0d with apgars of 8 and 9 via Vaginal, Spontaneous Delivery.    Cynthia presented to Maternity Care on 10/31/2021 with Dr. Castillo. Her group B Strep (GBS) carrier status was negative. She was admitted in active labor. She received AROM for augmentation, with moderate meconium. No analgesia. Tranexamic acid was given due to history of postpartum hemorrhage with prior delivery.    Delivery was via vaginal, spontaneous  to a sterile field under no anesthesia. Infant delivered in vertex  left  occiput  anterior  position. Shoulders delivered without difficulty. The baby was placed on the patient's abdomen.  Cord complications: none . Delayed cord clamping was performed, then baby was passed to waiting delivery team.  The cord was doubly clamped and cut 3 vessels  were noted.     Placenta delivered at 10/31/2021  8:09 PM . Placental disposition was Hospital disposal . Fundal massage performed and fundus found to be  firm. The following uterotonics were given: Pitocin (IV). Perineum, vagina, cervix were inspected, and the following lacerations were noted: None.. Delivery QBL (mL): 100 mL.    Excellent hemostasis was noted. Needle and sponge count correct. Infant and patient in delivery room in good and stable condition.   _________________    GA: 40w0d  GP:   Labor Complications: None   Additional Complications:    QBL:    Delivery Type: Vaginal, Spontaneous   Duration of Ruptured Membranes: 1h 46m  [place] Weight: 3.1 kg (6 lb 13.4 oz)   Apgar scores: 8 , 9         Bridger Abdul-Cynthia [[id number]]    Labor Event Times    Labor onset date: 10/31/21 Onset time:  2:00 PM    Dilation complete date: 10/31/21 Complete time:  8:00 PM   Start pushing date/time: 10/31/2021 1956      Labor Length    1st Stage (hrs): 6 (min): 0   2nd Stage (hrs): 0 (min): 7   3rd Stage (hrs): 0 (min): 2      Labor Events     labor?: No   steroids: None  Labor Type: Spontaneous, AROM     Antibiotics received during labor?: No     Rupture date/time: 10/31/21 1821   Rupture type: Artificial Rupture of Membranes  Fluid color: Meconium  Fluid odor: Normal     Augmentation: AROM  Indications for augmentation: Ineffective Contraction Pattern     Delivery/Placenta Date and Time    Delivery Date: 10/31/21 Delivery Time:  8:07 PM   Placenta Date/Time: 10/31/2021  8:09 PM  Oxytocin given at the time of delivery: after delivery of baby  Delivering clinician: Qing Ramsey MD   Other personnel present at delivery:  Provider Role   Shanell Holder, RN Registered Nurse   Dayana Samaniego RN Registered Nurse   Rowena Olivares RN Registered Nurse   Jocy, Patrica Bartholomew, APRN CNP Nurse Practitioner         Vaginal Counts     Initial count performed by 2 team members:  Two Team Members   Alcon Ramsey       [place] Suture Needles Sponges (RETIRED) Instruments   Initial counts 0 0 5    Added to count       Relief counts       Final counts 0 0 5          Placed during labor Accounted for at the end of labor   FSE NA NA   IUPC NA NA   Cervadil NA NA              Final count performed by 2 team members:  Two Team Members   marisela holder      Final count correct?: Yes     Apgars    Living status: Living   1 Minute 5 Minute 10 Minute 15 Minute 20 Minute   Skin color: 0  1       Heart rate: 2  2       Reflex irritability: 2  2       Muscle tone: 2  2       Respiratory effort: 2  2       Total: 8  9       Apgars assigned by: ANDREEA UGARTE     Cord    Vessels: 3 Vessels    Cord Complications: None               Cord Blood Disposition: Discard    Gases Sent?: No    Delayed cord clamping?: Yes    Cord Clamping  "Delay (seconds):  seconds    Stem cell collection?: No       [place] Resuscitation    Methods: Suctioning, None   Care at Delivery: NNP at delivery for meconium fluid. Infant had spontaneous cry then bulb suctioned on mother and stimulated. Brought to warmer at 1 minute and given routine drying and stimulation, delee suctioned for 6 ml thick green meconium fluid. Vigorous with lusty cry.  ANDREEA Huynh  10/31/2021 8:14 pm  Output in Delivery Room: Stool     [place] Measurements    Weight: 6 lb 13.4 oz Length: 1' 8.47\"   Head circumference: 33 cm    Output in delivery room: Stool     Skin to Skin and Feeding Plan    Skin to skin initiation date/time: 1/10/1841    Skin to skin with: Mother  Skin to skin end date/time:     Breastfeeding initiated date/time: 10/31/2021 2015     Labor Events and Shoulder Dystocia    Fetal Tracing Prior to Delivery: Category 2  Shoulder dystocia present?: Neg     Delivery (Maternal) (Provider to Complete) (236042)    Episiotomy: None  Perineal lacerations: None    Repair suture: None  Genital tract inspection done: Pos     Blood Loss  Mother: Cynthia Abdul #[id number]   Start of Mother's Information    Delivery Blood Loss  10/31/21 1400 - 21 0834    Delivery QBL (mL) Hospital Encounter 100 mL    Postpartum QBL (mL) Hospital Encounter 176 mL    Total  276 mL         End of Mother's Information  Mother: Cynthia Abdul #[id number]          Delivery - Provider to Complete (753804)    Delivering clinician: Qing Ramsey MD  Attempted Delivery Types (Choose all that apply): Spontaneous Vaginal Delivery  Delivery Type (Choose the 1 that will go to the Birth History): Vaginal, Spontaneous                   Other personnel:  Provider Role   Shanell Rondon RN Registered Nurse   Dayana Samaniego RN Registered Nurse   Rowena Olivares RN Registered Nurse   Patrica Sandra APRN CNP Nurse Practitioner                Placenta    Date/Time: 10/31/2021  8:09 " PM  Removal: Spontaneous  Disposition: Hospital disposal           Anesthesia    Method: None                Presentation and Position    Presentation: Vertex    Position: Left Occiput Anterior                 Completed by:   Qing Ramsey MD  [facility]  10/31/2021 8:16 PM   used: Not needed.

## 2021-11-01 NOTE — PROGRESS NOTES
"Vaginal Delivery Postpartum Day 1    Patient Name:  Cynthia Abdul  :      1985  MRN:     [id number]    Subjective:  Feeling well.  Lochia not concerning.   Pain is well controlled.  Voiding without difficulty, tolerating normal diet, and passing flatus.   The patient has no emotional concerns.  The baby is well and being fed by both breast and bottle.    Objective:  Patient Vitals for the past 24 hrs:   BP Temp Temp src Pulse Resp SpO2 Height Weight   21 0525 93/54 97.9  F (36.6  C) Oral 77 18 98 % -- --   21 0125 95/63 98  F (36.7  C) Oral 91 18 97 % -- --   10/31/21 2240 92/54 -- -- -- -- -- -- --   10/31/21 2225 112/57 -- -- -- -- -- -- --   10/31/21 2210 121/66 -- -- -- -- -- -- --   10/31/21 2154 110/69 -- -- -- -- -- -- --   10/31/21 2140 115/65 -- -- -- -- -- -- --   10/31/21 2125 122/65 -- -- -- -- -- -- --   10/31/21 2110 113/67 -- -- 78 -- -- -- --   10/31/21 2054 116/63 -- -- -- -- -- -- --   10/31/21 2039 122/73 -- -- -- -- -- -- --   10/31/21 2024 119/62 98.2  F (36.8  C) Oral 84 18 -- -- --   10/31/21 1947 -- 97.5  F (36.4  C) Oral -- -- -- -- --   10/31/21 1814 -- 97.5  F (36.4  C) Oral -- -- -- -- --   10/31/21 175 -- -- -- -- -- -- 1.727 m (5' 8\") 90.3 kg (199 lb)   10/31/21 1536 -- 97.8  F (36.6  C) Oral -- 16 -- -- --   10/31/21 1527 115/68 -- -- -- -- -- -- --      GEN: alert, oriented, not distressed    Patient requested that I not perform an examination as I am a male, due to her cultural preferences.    Recent Results (from the past 24 hour(s))   Adult Type and Screen    Collection Time: 10/31/21  4:23 PM   Result Value Ref Range    ABO/RH(D) A POS     Antibody Screen Negative Negative    SPECIMEN EXPIRATION DATE [id number]    CBC with platelets and differential    Collection Time: 10/31/21  4:23 PM   Result Value Ref Range    WBC Count 8.0 4.0 - 11.0 10e3/uL    RBC Count 4.37 3.80 - 5.20 10e6/uL    Hemoglobin 12.4 11.7 - 15.7 g/dL    Hematocrit 38.5 35.0 - 47.0 %    " MCV 88 78 - 100 fL    MCH 28.4 26.5 - 33.0 pg    MCHC 32.2 31.5 - 36.5 g/dL    RDW 15.8 (H) 10.0 - 15.0 %    Platelet Count 152 150 - 450 10e3/uL    % Neutrophils 72 %    % Lymphocytes 14 %    % Monocytes 5 %    % Eosinophils 6 %    % Basophils 1 %    % Immature Granulocytes 2 %    NRBCs per 100 WBC 0 <1 /100    Absolute Neutrophils 6.0 1.6 - 8.3 10e3/uL    Absolute Lymphocytes 1.1 0.8 - 5.3 10e3/uL    Absolute Monocytes 0.4 0.0 - 1.3 10e3/uL    Absolute Eosinophils 0.5 0.0 - 0.7 10e3/uL    Absolute Basophils 0.0 0.0 - 0.2 10e3/uL    Absolute Immature Granulocytes 0.1 (H) <=0.0 10e3/uL    Absolute NRBCs 0.0 10e3/uL   Asymptomatic COVID-19 Virus (Coronavirus) by PCR Nasopharyngeal    Collection Time: 10/31/21  8:54 PM    Specimen: Nasopharyngeal; Swab   Result Value Ref Range    SARS CoV2 PCR Negative Negative        Immunization History   Administered Date(s) Administered     Influenza Vaccine IM > 6 months Valent IIV4 (Alfuria,Fluzone) 02/06/2020, 10/28/2021     Tdap (Adacel,Boostrix) 04/14/2020, 09/24/2021       Assessment:   Post partum day 1.  Doing well.    Plan:    Continue current care.  Likely discharge to home, tomorrow.

## 2021-11-01 NOTE — PLAN OF CARE
Postpartum assessment WNL. Uterine cramping pain managed with Norco prn q 6 hours, as well as Ibuprofen, Aqua K heating pad and abdominal binder.    Patient is tired today, trying to rest between cares.   arrived this afternoon.     Plan for discharge tomorrow.       Problem: Bleeding (Postpartum Vaginal Delivery)  Goal: Hemostasis  Outcome: Improving     Problem: Pain (Postpartum Vaginal Delivery)  Goal: Acceptable Pain Control  Outcome: Improving

## 2021-11-02 VITALS
HEIGHT: 68 IN | TEMPERATURE: 98.8 F | BODY MASS INDEX: 30.16 KG/M2 | RESPIRATION RATE: 22 BRPM | DIASTOLIC BLOOD PRESSURE: 65 MMHG | OXYGEN SATURATION: 97 % | HEART RATE: 76 BPM | WEIGHT: 199 LBS | SYSTOLIC BLOOD PRESSURE: 110 MMHG

## 2021-11-02 PROBLEM — Z34.90 PREGNANT: Status: RESOLVED | Noted: 2021-10-31 | Resolved: 2021-11-02

## 2021-11-02 PROCEDURE — 250N000013 HC RX MED GY IP 250 OP 250 PS 637: Performed by: FAMILY MEDICINE

## 2021-11-02 PROCEDURE — 99207 PR SUBSEQUENT HOSPITAL CARE FOR MOTHER, 15 MINUTES: CPT | Performed by: FAMILY MEDICINE

## 2021-11-02 RX ORDER — HYDROXYZINE PAMOATE 25 MG/1
25 CAPSULE ORAL
Qty: 20 CAPSULE | Refills: 0 | Status: SHIPPED | OUTPATIENT
Start: 2021-11-02 | End: 2023-04-28

## 2021-11-02 RX ORDER — IBUPROFEN 800 MG/1
800 TABLET, FILM COATED ORAL EVERY 6 HOURS PRN
Qty: 30 TABLET | Refills: 0 | Status: SHIPPED | OUTPATIENT
Start: 2021-11-02 | End: 2023-04-28

## 2021-11-02 RX ORDER — DOCUSATE SODIUM 100 MG/1
100 CAPSULE, LIQUID FILLED ORAL 2 TIMES DAILY PRN
Qty: 60 CAPSULE | Refills: 0 | Status: SHIPPED | OUTPATIENT
Start: 2021-11-02 | End: 2023-04-28

## 2021-11-02 RX ORDER — HYDROCODONE BITARTRATE AND ACETAMINOPHEN 5; 325 MG/1; MG/1
1 TABLET ORAL EVERY 6 HOURS PRN
Qty: 7 TABLET | Refills: 0 | Status: SHIPPED | OUTPATIENT
Start: 2021-11-02 | End: 2022-03-03

## 2021-11-02 RX ADMIN — IBUPROFEN 800 MG: 800 TABLET, FILM COATED ORAL at 02:59

## 2021-11-02 RX ADMIN — ACETAMINOPHEN 650 MG: 325 TABLET ORAL at 02:59

## 2021-11-02 RX ADMIN — HYDROCODONE BITARTRATE AND ACETAMINOPHEN 1 TABLET: 5; 325 TABLET ORAL at 02:59

## 2021-11-02 RX ADMIN — IBUPROFEN 800 MG: 800 TABLET, FILM COATED ORAL at 08:59

## 2021-11-02 RX ADMIN — DOCUSATE SODIUM 100 MG: 100 CAPSULE, LIQUID FILLED ORAL at 08:30

## 2021-11-02 RX ADMIN — HYDROCODONE BITARTRATE AND ACETAMINOPHEN 1 TABLET: 5; 325 TABLET ORAL at 08:59

## 2021-11-02 NOTE — PLAN OF CARE
Problem: Adult Inpatient Plan of Care  Goal: Plan of Care Review  Outcome: Improving  Flowsheets (Taken 11/2/2021 8055)  Plan of Care Reviewed With:   patient   spouse   Pt' s vitals remain stable, pain well control with Motrin 800 mg, and Norco 1 tab every 6 hours prn. Mom is breastfeeding and supplementing with formula.

## 2021-11-02 NOTE — LACTATION NOTE
"This note was copied from a baby's chart.  This writer attempted to see Cynthia earlier in the shift but she was sleeping.  Lactation visit made.  Cynthia states, \"I have no milk.\"  She allowed this writer to do hand expression and several large drops were immediately expressed from her breast.  She was instructed to pump her breasts for every bottle infant receives, in order to build and protect her milk supply.  She has a breast pump at home.  Her nipples are large and she was informed she may need to buy a larger size flange.  She verbalizes understanding of all education given.  She denies any further questions.      "

## 2021-11-02 NOTE — PLAN OF CARE
Problem: Adjustment to Role Transition (Postpartum Vaginal Delivery)  Goal: Successful Maternal Role Transition  Outcome: Improving     Problem: Bleeding (Postpartum Vaginal Delivery)  Goal: Hemostasis  Outcome: Improving     Problem: Infection (Postpartum Vaginal Delivery)  Goal: Absence of Infection Signs and Symptoms  Outcome: Improving     Problem: Pain (Postpartum Vaginal Delivery)  Goal: Acceptable Pain Control  Outcome: Improving     Problem: Urinary Retention (Postpartum Vaginal Delivery)  Goal: Effective Urinary Elimination  Outcome: Improving     VSS, FFU/1 with scant lochia, endorses intermittent cramping 7-8/10 and taking PRN Ibuprofen and Norco with good pain relief. Voiding and good appetite. Bradley and caring towards baby.

## 2021-11-02 NOTE — PROGRESS NOTES
Outreach Note for [facility]    Cynthia Abdul  [id number]  1985    Chart reviewed, discharge plan discussed with attending MD and patient, Cynthia, needs assessed. Cynthia verbalizes understanding of plan, requests postpartum home care visit, nurse visit planned for Wednesday, 11/3, Home Care Intake updated. Address and phone number reported as being correct in EMR. Follow-up post-delivery appointment with  planned in 4-6 weeks at the [facility].    Cynthia states she has good support at home, has baby care essentials, and feels ready to discharge today with . Outreach RN will continue to follow and assist if needed with discharge plan. No additional needs identified at this time.

## 2021-11-02 NOTE — DISCHARGE SUMMARY
Vaginal Delivery Postpartum Day 2/Discharge Summary    Patient Name:  Cynthia Abdul  :      1985  MRN:      [id number]    Admission Date:  10/31/2021  Delivery Date:  10/31/2021  Gestational Age at Delivery:  40w0d  Discharge Date:  2021    Subjective:  Patient has no concerns.  Lochia is decreasing.  Pain is well controlled.  Eating and ambulating well.  Normal urine output.   The baby is well and being fed by both breast and bottle.    Mother concerned about low milk supply  Has pump at home.  Will have lactation see patient today.    She request NORCO for pain control  Says uterus is cramping    Also requests a sleeping medicine.  We discussed the risks of sedation and that she can absolutely not use an opioid and another sedating medicine while she is caring for her baby.  It would be reasonable to use hydroxyzine while she is not caring for baby (ex. when her  is clearly engaged in caring for her).  We discussed the risks of falling asleep with baby in bed while nursing, etc.  Patient expressed understanding of those risks.    Discharge Exam:    Patient Vitals for the past 24 hrs:   BP Temp Temp src Pulse Resp SpO2   21 0830 110/65 98.8  F (37.1  C) Oral 76 22 97 %   21 0100 105/66 98.2  F (36.8  C) Oral 84 18 99 %   21 1712 96/62 98  F (36.7  C) Oral 82 16 97 %      GEN: alert, not distressed  Rn reports normal fundal checks.    Patient again requested that I not perform an examination as I am a male, due to her cultural preferences.      Immunization History   Administered Date(s) Administered     Influenza Vaccine IM > 6 months Valent IIV4 (Alfuria,Fluzone) 2020, 10/28/2021     Tdap (Adacel,Boostrix) 2020, 2021     Principal Diagnosis:  Well ppd #2, s/p NVD    Patient Active Problem List   Diagnosis     Varicose veins during pregnancy, antepartum     Postpartum hemorrhage, unspecified type     Pregnant      (normal spontaneous vaginal delivery)        Procedure(s) Performed:    Indication for Induction:  n/a    Plan:  Discharge to home.  Follow up with Dr. Castillo in 4-6 weeks  Patient Instructions:      Physical activity: at tolerated    Diet:  As tolerated  Recommended COVID vaccine.  Patient requests to follow up in clinic for this.    Discharge Medications:    Cynthia Abdul   Home Medication Instructions JENNIFER:[id number]    Printed on:21 5091   Medication Information                      calcium, as carbonate, (TUMS) 200 mg calcium (500 mg) chewable tablet  [CALCIUM, AS CARBONATE, (TUMS) 200 MG CALCIUM (500 MG) CHEWABLE TABLET] Chew 1 tablet (200 mg total) daily.             carboxymethylcellulose (REFRESH LIQUIGEL) 1 % ophthalmic solution  [CARBOXYMETHYLCELLULOSE (REFRESH LIQUIGEL) 1 % OPHTHALMIC SOLUTION] Place 1 drop in both eyes as needed throughout the day.             cetirizine (ZYRTEC) 5 MG tablet  [CETIRIZINE (ZYRTEC) 5 MG TABLET] TAKE 1 TABLET BY MOUTH EVERY DAY             cholecalciferol, vitamin D3, (VITAMIN D3) 50 mcg (2,000 unit) Tab  [CHOLECALCIFEROL, VITAMIN D3, (VITAMIN D3) 50 MCG (2,000 UNIT) TAB] Take 1 tablet (2,000 Units total) by mouth daily.             docusate sodium (COLACE) 100 MG capsule  Take 1 capsule (100 mg) by mouth 2 times daily as needed for constipation             ferrous sulfate (FEROSUL) 325 (65 Fe) MG tablet  Take 1 tablet (325 mg) by mouth daily             fluticasone (FLONASE) 50 MCG/ACT nasal spray  Spray 1 spray into both nostrils daily             HYDROcodone-acetaminophen (NORCO) 5-325 MG tablet  Take 1 tablet by mouth every 6 hours as needed for moderate to severe pain             hydrOXYzine (VISTARIL) 25 MG capsule  Take 1 capsule (25 mg) by mouth nightly as needed for itching (sleep)             ibuprofen (ADVIL/MOTRIN) 800 MG tablet  Take 1 tablet (800 mg) by mouth every 6 hours as needed for other (cramping)             inhalational spacing device (AEROCHAMBER MV) Spcr  [INHALATIONAL SPACING  DEVICE (AEROCHAMBER MV) SPCR] Use with albuterol inhaler             ketotifen (ZADITOR/ZYRTEC ITCHY EYES) 0.025 % (0.035 %) ophthalmic solution  [KETOTIFEN (ZADITOR/ZYRTEC ITCHY EYES) 0.025 % (0.035 %) OPHTHALMIC SOLUTION] Apply 1 drop to each eye daily.             Prenatal Vit-Fe Fumarate-FA (PRENATAL MULTIVITAMIN W/IRON) 27-0.8 MG tablet  Take 1 tablet by mouth daily

## 2021-11-02 NOTE — DISCHARGE INSTRUCTIONS
You have a Home Care nurse visit planned for Wednesday, 2021. The nurse will contact you to confirm the appointment time. If you do not receive a call by Wednesday morning, please call Home Care at [phone]. Please do not schedule a clinic appointment on the same day as home nurse visit.    *Keep and use Thursday, 2021, OB appointment with , bring baby to clinic for  check.    Postpartum Vaginal Delivery Instructions    Activity       Ask family and friends for help when you need it.    Do not place anything in your vagina for 6 weeks.    You are not restricted on other activities, but take it easy for a few weeks to allow your body to recover from delivery.  You are able to do any activities you feel up to that point.    No driving until you have stopped taking your pain medications (usually two weeks after delivery).     Call your health care provider if you have any of these symptoms:       Increased pain, swelling, redness, or fluid around your stiches from an episiotomy or perineal tear.    A fever above 100.4 F (38 C) with or without chills when placing a thermometer under your tongue.    You soak a sanitary pad with blood within 1 hour, or you see blood clots larger than a golf ball.    Bleeding that lasts more than 6 weeks.    Vaginal discharge that smells bad.    Severe pain, cramping or tenderness in your lower belly area.    A need to urinate more frequently (use the toilet more often), more urgently (use the toilet very quickly), or it burns when you urinate.    Nausea and vomiting.    Redness, swelling or pain around a vein in your leg.    Problems breastfeeding or a red or painful area on your breast.    Chest pain and cough or are gasping for air.    Problems coping with sadness, anxiety, or depression.  If you have any concerns about hurting yourself or the baby, call your provider immediately.     You have questions or concerns after you return home.     Keep your  hands clean:  Always wash your hands before touching your perineal area and stitches.  This helps reduce your risk of infection.  If your hands aren't dirty, you may use an alcohol hand-rub to clean your hands. Keep your nails clean and short.

## 2021-11-03 ENCOUNTER — MEDICAL CORRESPONDENCE (OUTPATIENT)
Dept: HEALTH INFORMATION MANAGEMENT | Facility: CLINIC | Age: 36
End: 2021-11-03

## 2021-12-03 DIAGNOSIS — Z76.0 ENCOUNTER FOR MEDICATION REFILL: Primary | ICD-10-CM

## 2021-12-03 DIAGNOSIS — J30.9 ALLERGIC RHINITIS, UNSPECIFIED SEASONALITY, UNSPECIFIED TRIGGER: ICD-10-CM

## 2021-12-03 RX ORDER — CETIRIZINE HYDROCHLORIDE 5 MG/1
TABLET ORAL
Qty: 30 TABLET | Refills: 11 | Status: SHIPPED | OUTPATIENT
Start: 2021-12-03 | End: 2022-03-03

## 2021-12-03 NOTE — TELEPHONE ENCOUNTER
Reason for Call:  Medication or medication refill:    Do you use a [facility]?  Name of the pharmacy and phone number for the current request:  [facility] on file    Name of the medication requested: cetirizine (ZYRTEC) 5 MG tablet    Other request: Patient called to request a refill of this medication. Please send refills to pharmacy.    Can we leave a detailed message on this number? YES    Phone number patient can be reached at: Home number on file [phone] (home)    Best Time: any    Call taken on 12/3/2021 at 2:54 PM by Annalisa Suresh

## 2021-12-06 ENCOUNTER — TELEPHONE (OUTPATIENT)
Dept: FAMILY MEDICINE | Facility: CLINIC | Age: 36
End: 2021-12-06
Payer: COMMERCIAL

## 2022-01-11 DIAGNOSIS — Z91.89 AT RISK FOR INEFFECTIVE BREASTFEEDING: ICD-10-CM

## 2022-01-11 DIAGNOSIS — Z30.9 ENCOUNTER FOR CONTRACEPTIVE MANAGEMENT, UNSPECIFIED TYPE: Primary | ICD-10-CM

## 2022-01-11 RX ORDER — ACETAMINOPHEN AND CODEINE PHOSPHATE 120; 12 MG/5ML; MG/5ML
0.35 SOLUTION ORAL DAILY
Qty: 90 TABLET | Refills: 3 | Status: SHIPPED | OUTPATIENT
Start: 2022-01-11 | End: 2023-04-28

## 2022-03-03 ENCOUNTER — OFFICE VISIT (OUTPATIENT)
Dept: FAMILY MEDICINE | Facility: CLINIC | Age: 37
End: 2022-03-03
Payer: COMMERCIAL

## 2022-03-03 VITALS
WEIGHT: 210.38 LBS | RESPIRATION RATE: 16 BRPM | TEMPERATURE: 97.6 F | HEIGHT: 68 IN | HEART RATE: 67 BPM | BODY MASS INDEX: 31.89 KG/M2 | SYSTOLIC BLOOD PRESSURE: 104 MMHG | OXYGEN SATURATION: 96 % | DIASTOLIC BLOOD PRESSURE: 60 MMHG

## 2022-03-03 DIAGNOSIS — H10.13 ALLERGIC CONJUNCTIVITIS, BILATERAL: Primary | ICD-10-CM

## 2022-03-03 DIAGNOSIS — K92.1 MELENA: ICD-10-CM

## 2022-03-03 PROCEDURE — 99213 OFFICE O/P EST LOW 20 MIN: CPT | Performed by: FAMILY MEDICINE

## 2022-03-03 RX ORDER — OLOPATADINE HYDROCHLORIDE 1 MG/ML
1 SOLUTION/ DROPS OPHTHALMIC 2 TIMES DAILY
Qty: 15 ML | Refills: 0 | Status: SHIPPED | OUTPATIENT
Start: 2022-03-03 | End: 2023-04-28

## 2022-03-03 RX ORDER — CETIRIZINE HYDROCHLORIDE 5 MG/1
TABLET ORAL
Qty: 30 TABLET | Refills: 11 | Status: SHIPPED | OUTPATIENT
Start: 2022-03-03 | End: 2023-04-28

## 2022-03-03 RX ORDER — CALCIUM CARBONATE 500 MG/1
TABLET, CHEWABLE ORAL DAILY
Status: CANCELLED | OUTPATIENT
Start: 2022-03-03

## 2022-03-03 RX ORDER — FLUOCINOLONE ACETONIDE, HYDROQUINONE, AND TRETINOIN .1; 40; .5 MG/G; MG/G; MG/G
CREAM TOPICAL
Qty: 30 G | Refills: 1 | Status: SHIPPED | OUTPATIENT
Start: 2022-03-03 | End: 2023-04-28

## 2022-03-03 RX ORDER — FERROUS SULFATE 325(65) MG
325 TABLET ORAL DAILY
Qty: 90 TABLET | Refills: 3 | Status: CANCELLED | OUTPATIENT
Start: 2022-03-03

## 2022-03-03 NOTE — PROGRESS NOTES
"ASSESMENT AND PLAN:  Allergic conjunctivitis, bilateral  She will continue Zyrtec.  - olopatadine (PATANOL) 0.1 % ophthalmic solution; Place 1 drop into both eyes 2 times daily    Melena  Prescribed Tri-Fab.  Informed the patient that this will not be covered by her insurance but she is willing to pay out-of-pocket.  Stressed importance of using sunscreen daily.  Instructed not to use Tri-Fab cream for a long period of time.   - fluocin-hydroquinone-tretinoin (TRI-FAB) 0.01-4-0.05 % CREA; Use very thin layer to affected she is after daily moisturizer once a day.  Do not use it for more than 2-3 consecutive weeks.    This transcription uses voice recognition software, which may contain typographical errors.      SUBJECTIVE: Cynthia Abdul is a 36-year-old female here with concerns of allergy symptoms and numbness on her cheeks.  She takes Zyrtec.  She has been using eyedrops for watery itchy eyes but is not helping.  She wants to try something else.  She wants \" a good cream\" to use on her cheeks.  She has darkness on her cheeks and wants to get rid of it.  She is also wondering a prescription antiaging cream.    Past Medical History:   Diagnosis Date     Atypical squamous cells of undetermined significance (ASCUS) on Papanicolaou smear of cervix 2015     Papanicolaou smear of cervix with low grade squamous intraepithelial lesion (LGSIL) 2014     Seasonal allergies      Wheezing     no documented asthma     Patient Active Problem List   Diagnosis     Varicose veins during pregnancy, antepartum     Postpartum hemorrhage, unspecified type      (normal spontaneous vaginal delivery)       Allergies:  No Known Allergies    History   Smoking Status     Never Smoker   Smokeless Tobacco     Never Used       Review of systems otherwise negative except as listed in HPI.   History   Smoking Status     Never Smoker   Smokeless Tobacco     Never Used       OBJECTICE: /60 (BP Location: Left arm, Patient " "Position: Sitting, Cuff Size: Adult Large)   Pulse 67   Temp 97.6  F (36.4  C) (Oral)   Resp 16   Ht 1.727 m (5' 8\")   Wt 95.4 kg (210 lb 6 oz)   LMP 02/06/2022 (Approximate)   SpO2 96%   BMI 31.99 kg/m            GEN-alert,  in no apparent distress.  HEENT- neck is supple.  CV-regular rate and rhythm with no murmur.   RESP-lungs clear to auscultation .  ABDOMEN- Soft , not tender.  SKIN-melasma both cheeks        Tommie Angeles MD   3/3/2022   "

## 2022-04-25 ENCOUNTER — TELEPHONE (OUTPATIENT)
Dept: FAMILY MEDICINE | Facility: CLINIC | Age: 37
End: 2022-04-25
Payer: COMMERCIAL

## 2022-04-25 DIAGNOSIS — K92.1 MELENA: ICD-10-CM

## 2022-04-25 RX ORDER — FLUOCINOLONE ACETONIDE, HYDROQUINONE, AND TRETINOIN .1; 40; .5 MG/G; MG/G; MG/G
CREAM TOPICAL
Qty: 30 G | Refills: 3 | Status: CANCELLED | OUTPATIENT
Start: 2022-04-25

## 2022-04-25 NOTE — TELEPHONE ENCOUNTER
Reason for Call:  Other prescription    Detailed comments: [facility] from [facility] called requesting for a refill of Tri-Mikala 0.01-4-0.05 % External Cream (fluocin-hydroquinone-tretinoin). Pharmacy is not listed in patient chart. Writer checked and patient still have another refill of medication at [facility] pharmacy. Please call patient to verify of new pharmacy and send refill to [facility] in [facility], phone number [phone], option 3.    Phone Number Patient can be reached at: Cell number on file:    Telephone Information:   Mobile [phone]       Best Time: Anytime    Can we leave a detailed message on this number? YES    Call taken on 4/25/2022 at 1:52 PM by Hilary Antunez

## 2022-04-25 NOTE — TELEPHONE ENCOUNTER
[facility] updated preferred pharmacy.     Medication refill queued and pended. Prescriber please review, sign, and send if appropriate. Thank you.

## 2022-04-28 NOTE — TELEPHONE ENCOUNTER
Vangie from [facility] called to check status of refill request. TC informed her that request has been denied by PCP. They will have pt contact clinic if she wishes to get rx from there. Thanks.

## 2022-05-05 NOTE — TELEPHONE ENCOUNTER
Message routed to PCP,Dr Castillo for Rx order to [facility].    Lissett Sheehan RN, covering for [facility]

## 2022-05-05 NOTE — TELEPHONE ENCOUNTER
This medication is only to be used for a few weeks, then stopped.  It is not to be used for a long period of time.    So it is not appropriate to refill at this time.

## 2022-05-05 NOTE — TELEPHONE ENCOUNTER
Patient call back states patient had to pay for this medication out of pocket and it was costing too much at [facility] pharmacy. Writer inform patient that provider has denied to send prescription to [facility] pharmacy. Per caller, the out of pocket cost is $200 plus at [facility] pharmacy. Patient also states [facility] cost for medication is $170, and is a little bit more affordable for patient to pay out of pocket. Patient does not want other cream medication, but this one only because this cream has helped patien face very much. Patient insist on requesting for this medication to be send to [facility] pharmacy.    Please recommend and advise Dr. Castillo.

## 2023-04-20 ENCOUNTER — LAB REQUISITION (OUTPATIENT)
Dept: LAB | Facility: CLINIC | Age: 38
End: 2023-04-20
Payer: COMMERCIAL

## 2023-04-20 DIAGNOSIS — L65.0 TELOGEN EFFLUVIUM: ICD-10-CM

## 2023-04-20 LAB
DEPRECATED CALCIDIOL+CALCIFEROL SERPL-MC: 25 UG/L (ref 20–75)
FERRITIN SERPL-MCNC: 36 NG/ML (ref 6–175)
IRON BINDING CAPACITY (ROCHE): 246 UG/DL (ref 240–430)
IRON SATN MFR SERPL: 13 % (ref 15–46)
IRON SERPL-MCNC: 33 UG/DL (ref 37–145)
TSH SERPL DL<=0.005 MIU/L-ACNC: 2.7 UIU/ML (ref 0.3–4.2)

## 2023-04-20 PROCEDURE — 82728 ASSAY OF FERRITIN: CPT | Mod: ORL | Performed by: DERMATOLOGY

## 2023-04-20 PROCEDURE — 83550 IRON BINDING TEST: CPT | Mod: ORL | Performed by: DERMATOLOGY

## 2023-04-20 PROCEDURE — 84443 ASSAY THYROID STIM HORMONE: CPT | Mod: ORL | Performed by: DERMATOLOGY

## 2023-04-20 PROCEDURE — 82306 VITAMIN D 25 HYDROXY: CPT | Mod: ORL | Performed by: DERMATOLOGY

## 2023-04-28 ENCOUNTER — OFFICE VISIT (OUTPATIENT)
Dept: FAMILY MEDICINE | Facility: CLINIC | Age: 38
End: 2023-04-28
Payer: COMMERCIAL

## 2023-04-28 VITALS
OXYGEN SATURATION: 100 % | HEART RATE: 76 BPM | WEIGHT: 172 LBS | TEMPERATURE: 97.4 F | RESPIRATION RATE: 16 BRPM | DIASTOLIC BLOOD PRESSURE: 71 MMHG | HEIGHT: 66 IN | SYSTOLIC BLOOD PRESSURE: 102 MMHG | BODY MASS INDEX: 27.64 KG/M2

## 2023-04-28 DIAGNOSIS — L65.9 HAIR LOSS: ICD-10-CM

## 2023-04-28 DIAGNOSIS — Z00.00 ADULT GENERAL MEDICAL EXAM: Primary | ICD-10-CM

## 2023-04-28 DIAGNOSIS — Z11.59 NEED FOR HEPATITIS C SCREENING TEST: ICD-10-CM

## 2023-04-28 DIAGNOSIS — R10.32 ABDOMINAL PAIN, CHRONIC, LEFT LOWER QUADRANT: ICD-10-CM

## 2023-04-28 DIAGNOSIS — G89.29 ABDOMINAL PAIN, CHRONIC, LEFT LOWER QUADRANT: ICD-10-CM

## 2023-04-28 DIAGNOSIS — E55.9 VITAMIN D DEFICIENCY: ICD-10-CM

## 2023-04-28 DIAGNOSIS — Z86.2 HISTORY OF ANEMIA: ICD-10-CM

## 2023-04-28 DIAGNOSIS — Z12.4 CERVICAL CANCER SCREENING: ICD-10-CM

## 2023-04-28 DIAGNOSIS — Z13.220 LIPID SCREENING: ICD-10-CM

## 2023-04-28 DIAGNOSIS — N89.8 VAGINAL DISCHARGE: ICD-10-CM

## 2023-04-28 LAB
ALBUMIN SERPL BCG-MCNC: 3.8 G/DL (ref 3.5–5.2)
ALP SERPL-CCNC: 73 U/L (ref 35–104)
ALT SERPL W P-5'-P-CCNC: 10 U/L (ref 10–35)
ANION GAP SERPL CALCULATED.3IONS-SCNC: 11 MMOL/L (ref 7–15)
AST SERPL W P-5'-P-CCNC: 25 U/L (ref 10–35)
BASOPHILS # BLD AUTO: 0 10E3/UL (ref 0–0.2)
BASOPHILS NFR BLD AUTO: 1 %
BILIRUB SERPL-MCNC: 0.2 MG/DL
BUN SERPL-MCNC: 13.1 MG/DL (ref 6–20)
CALCIUM SERPL-MCNC: 9.1 MG/DL (ref 8.6–10)
CHLORIDE SERPL-SCNC: 101 MMOL/L (ref 98–107)
CHOLEST SERPL-MCNC: 179 MG/DL
CLUE CELLS: NORMAL
CREAT SERPL-MCNC: 0.56 MG/DL (ref 0.51–0.95)
DEPRECATED CALCIDIOL+CALCIFEROL SERPL-MC: 25 UG/L (ref 20–75)
DEPRECATED HCO3 PLAS-SCNC: 26 MMOL/L (ref 22–29)
EOSINOPHIL # BLD AUTO: 0.6 10E3/UL (ref 0–0.7)
EOSINOPHIL NFR BLD AUTO: 11 %
ERYTHROCYTE [DISTWIDTH] IN BLOOD BY AUTOMATED COUNT: 12.9 % (ref 10–15)
FERRITIN SERPL-MCNC: 29 NG/ML (ref 6–175)
GFR SERPL CREATININE-BSD FRML MDRD: >90 ML/MIN/1.73M2
GLUCOSE SERPL-MCNC: 64 MG/DL (ref 70–99)
HCT VFR BLD AUTO: 35.3 % (ref 35–47)
HDLC SERPL-MCNC: 51 MG/DL
HGB BLD-MCNC: 11.2 G/DL (ref 11.7–15.7)
IMM GRANULOCYTES # BLD: 0 10E3/UL
IMM GRANULOCYTES NFR BLD: 0 %
IRON BINDING CAPACITY (ROCHE): 244 UG/DL (ref 240–430)
IRON SATN MFR SERPL: 16 % (ref 15–46)
IRON SERPL-MCNC: 38 UG/DL (ref 37–145)
LDLC SERPL CALC-MCNC: 115 MG/DL
LYMPHOCYTES # BLD AUTO: 1.7 10E3/UL (ref 0.8–5.3)
LYMPHOCYTES NFR BLD AUTO: 34 %
MAGNESIUM SERPL-MCNC: 1.7 MG/DL (ref 1.7–2.3)
MCH RBC QN AUTO: 28.8 PG (ref 26.5–33)
MCHC RBC AUTO-ENTMCNC: 31.7 G/DL (ref 31.5–36.5)
MCV RBC AUTO: 91 FL (ref 78–100)
MONOCYTES # BLD AUTO: 0.3 10E3/UL (ref 0–1.3)
MONOCYTES NFR BLD AUTO: 6 %
NEUTROPHILS # BLD AUTO: 2.5 10E3/UL (ref 1.6–8.3)
NEUTROPHILS NFR BLD AUTO: 48 %
NONHDLC SERPL-MCNC: 128 MG/DL
PLATELET # BLD AUTO: 206 10E3/UL (ref 150–450)
POTASSIUM SERPL-SCNC: 3.6 MMOL/L (ref 3.4–5.3)
PROT SERPL-MCNC: 7.1 G/DL (ref 6.4–8.3)
RBC # BLD AUTO: 3.89 10E6/UL (ref 3.8–5.2)
SODIUM SERPL-SCNC: 138 MMOL/L (ref 136–145)
TRICHOMONAS, WET PREP: NORMAL
TRIGL SERPL-MCNC: 65 MG/DL
TSH SERPL DL<=0.005 MIU/L-ACNC: 2.6 UIU/ML (ref 0.3–4.2)
WBC # BLD AUTO: 5.2 10E3/UL (ref 4–11)
WBC'S/HIGH POWER FIELD, WET PREP: NORMAL
YEAST, WET PREP: NORMAL

## 2023-04-28 PROCEDURE — 83735 ASSAY OF MAGNESIUM: CPT | Performed by: FAMILY MEDICINE

## 2023-04-28 PROCEDURE — 87210 SMEAR WET MOUNT SALINE/INK: CPT | Performed by: FAMILY MEDICINE

## 2023-04-28 PROCEDURE — 87624 HPV HI-RISK TYP POOLED RSLT: CPT | Performed by: FAMILY MEDICINE

## 2023-04-28 PROCEDURE — 80050 GENERAL HEALTH PANEL: CPT | Performed by: FAMILY MEDICINE

## 2023-04-28 PROCEDURE — 82306 VITAMIN D 25 HYDROXY: CPT | Performed by: FAMILY MEDICINE

## 2023-04-28 PROCEDURE — 82728 ASSAY OF FERRITIN: CPT | Performed by: FAMILY MEDICINE

## 2023-04-28 PROCEDURE — 80061 LIPID PANEL: CPT | Performed by: FAMILY MEDICINE

## 2023-04-28 PROCEDURE — 36415 COLL VENOUS BLD VENIPUNCTURE: CPT | Performed by: FAMILY MEDICINE

## 2023-04-28 PROCEDURE — 99395 PREV VISIT EST AGE 18-39: CPT | Performed by: FAMILY MEDICINE

## 2023-04-28 PROCEDURE — G0145 SCR C/V CYTO,THINLAYER,RESCR: HCPCS | Performed by: FAMILY MEDICINE

## 2023-04-28 PROCEDURE — 83540 ASSAY OF IRON: CPT | Performed by: FAMILY MEDICINE

## 2023-04-28 PROCEDURE — 83550 IRON BINDING TEST: CPT | Performed by: FAMILY MEDICINE

## 2023-04-28 ASSESSMENT — ENCOUNTER SYMPTOMS
HEMATOCHEZIA: 0
HEMATURIA: 0
FEVER: 0
DIARRHEA: 0
CONSTIPATION: 0
COUGH: 0
JOINT SWELLING: 0
HEARTBURN: 0
HEADACHES: 0
ABDOMINAL PAIN: 0
NERVOUS/ANXIOUS: 0
CHILLS: 0
DIZZINESS: 0
EYE PAIN: 0
BREAST MASS: 0
FREQUENCY: 0
ARTHRALGIAS: 0

## 2023-04-28 NOTE — PROGRESS NOTES
"   SUBJECTIVE:   CC: Cynthia is an 37 year old who presents for preventive health visit.       4/28/2023     9:15 AM   Additional Questions   Roomed by alicia   Patient has been advised of split billing requirements and indicates understanding: Yes  Hair loss   Thinning - hair isn't as coarse as previous     7 kids - 19 yo to 2 yo     Was in [place] x 1 year -   Was \"on vacation for her kids\"          Healthy Habits:     Getting at least 3 servings of Calcium per day:  NO    Bi-annual eye exam:  Yes    Dental care twice a year:  Yes    Sleep apnea or symptoms of sleep apnea:  Daytime drowsiness    Diet:  Low salt, Low fat/cholesterol, Gluten-free/reduced and Other    Frequency of exercise:  None    Taking medications regularly:  Yes    Medication side effects:  None    PHQ-2 Total Score: 0    Additional concerns today:  No          Today's PHQ-2 Score:       4/28/2023     9:12 AM   PHQ-2 ( 1999 [employer])   Q1: Little interest or pleasure in doing things 0   Q2: Feeling down, depressed or hopeless 0   PHQ-2 Score 0   Q1: Little interest or pleasure in doing things Not at all   Q2: Feeling down, depressed or hopeless Not at all   PHQ-2 Score 0       Have you ever done Advance Care Planning? (For example, a Health Directive, POLST, or a discussion with a medical provider or your loved ones about your wishes): discussed - no written documents    Social History     Tobacco Use     Smoking status: Never     Passive exposure: Never     Smokeless tobacco: Never   Vaping Use     Vaping status: Not on file   Substance Use Topics     Alcohol use: Never             4/28/2023     9:02 AM   Alcohol Use   Prescreen: >3 drinks/day or >7 drinks/week? Not Applicable     Reviewed orders with patient.  Reviewed health maintenance and updated orders accordingly - Yes  BP Readings from Last 3 Encounters:   04/28/23 102/71   03/03/22 104/60   11/02/21 110/65    Wt Readings from Last 3 Encounters:   04/28/23 78 kg (172 lb)   03/03/22 95.4 kg " (210 lb 6 oz)   10/31/21 90.3 kg (199 lb)                  Patient Active Problem List   Diagnosis     Varicose veins during pregnancy, antepartum     Postpartum hemorrhage, unspecified type      (normal spontaneous vaginal delivery)     Past Surgical History:   Procedure Laterality Date     NO PAST SURGERIES         Social History     Tobacco Use     Smoking status: Never     Passive exposure: Never     Smokeless tobacco: Never   Vaping Use     Vaping status: Not on file   Substance Use Topics     Alcohol use: Never     Family History   Problem Relation Age of Onset     No Known Problems Mother      No Known Problems Father          Current Outpatient Medications   Medication Sig Dispense Refill     hydroquinone (ANN) 4 % external cream APPLY A SMALL AMOUNT TO AFFECTED AREA TWICE A DAY APPLY TO DARK SPOTS TWICE DAILY       RETIN-A 0.025 % external cream Apply daily to affected areas.       No Known Allergies    Breast Cancer Screenin/28/2023     9:15 AM   Breast CA Risk Assessment (FHS-7)   Do you have a family history of breast, colon, or ovarian cancer? No / Unknown         Patient under 40 years of age: Routine Mammogram Screening not recommended.   Pertinent mammograms are reviewed under the imaging tab.    History of abnormal Pap smear: NO - age 30-65 PAP every 5 years with negative HPV co-testing recommended     Reviewed and updated as needed this visit by clinical staff   Tobacco  Allergies  Meds  Problems  Med Hx  Surg Hx  Fam Hx          Reviewed and updated as needed this visit by Provider   Tobacco  Allergies  Meds  Problems  Med Hx  Surg Hx  Fam Hx         Past Medical History:   Diagnosis Date     Atypical squamous cells of undetermined significance (ASCUS) on Papanicolaou smear of cervix 2015     Papanicolaou smear of cervix with low grade squamous intraepithelial lesion (LGSIL) 2014     Seasonal allergies      Wheezing     no documented asthma      Past  "Surgical History:   Procedure Laterality Date     NO PAST SURGERIES       OB History    Para Term  AB Living   7 7 7 0 0 7   SAB IAB Ectopic Multiple Live Births   0 0 0 0 7      # Outcome Date GA Lbr Librado/2nd Weight Sex Delivery Anes PTL Lv   7 Term 10/31/21 40w0d 06:00 / 00:07 3.1 kg (6 lb 13.4 oz) F Vag-Spont None N GIO      Name: CRISPINFEMALE-ELIEZER      Apgar1: 8  Apgar5: 9   6 Term 20 41w0d 07:28 / 00:07 3.38 kg (7 lb 7.2 oz) M Vag-Spont None N GIO      Name: CRISPINMALE-ELIEZER      Apgar1: 8  Apgar5: 9   5 Term 2015 40w0d    Vag-Spont   GIO   4 Term 2014 40w0d    Vag-Spont   GIO   3 Term 2005 40w0d    Vag-Spont   GIO   2 Term         GIO   1 Term         GIO       Review of Systems   Constitutional: Negative for chills and fever.   HENT: Negative for congestion, ear pain and hearing loss.    Eyes: Negative for pain.   Respiratory: Negative for cough.    Cardiovascular: Negative for chest pain.   Gastrointestinal: Negative for abdominal pain, constipation, diarrhea, heartburn and hematochezia.   Breasts:  Negative for tenderness, breast mass and discharge.   Genitourinary: Negative for frequency, genital sores, hematuria, pelvic pain, vaginal bleeding and vaginal discharge.   Musculoskeletal: Negative for arthralgias and joint swelling.   Neurological: Negative for dizziness and headaches.   Psychiatric/Behavioral: The patient is not nervous/anxious.    All other systems reviewed and are negative.         OBJECTIVE:   /71 (BP Location: Left arm, Patient Position: Sitting, Cuff Size: Adult Regular)   Pulse 76   Temp 97.4  F (36.3  C) (Temporal)   Resp 16   Ht 1.676 m (5' 6\")   Wt 78 kg (172 lb)   LMP 2023   SpO2 100%   BMI 27.76 kg/m    Physical Exam  Vitals reviewed.   Constitutional:       General: She is not in acute distress.     Appearance: Normal appearance.   HENT:      Head: Normocephalic.      Right Ear: Tympanic membrane, ear canal and external ear normal. "      Left Ear: Tympanic membrane, ear canal and external ear normal.      Nose: Nose normal.      Mouth/Throat:      Mouth: Mucous membranes are moist.      Pharynx: No posterior oropharyngeal erythema.   Eyes:      Extraocular Movements: Extraocular movements intact.      Conjunctiva/sclera: Conjunctivae normal.      Pupils: Pupils are equal, round, and reactive to light.   Cardiovascular:      Rate and Rhythm: Normal rate and regular rhythm.      Pulses: Normal pulses.      Heart sounds: Normal heart sounds. No murmur heard.  Pulmonary:      Effort: Pulmonary effort is normal.      Breath sounds: Normal breath sounds.   Abdominal:      Palpations: Abdomen is soft. There is no mass.      Tenderness: There is no abdominal tenderness. There is no guarding or rebound.   Genitourinary:     Comments: PELVIC EXAM:External genitalia: normal  Vaginal mucosa normal  Vaginal discharge: minimal white  Speculum exam shows a normal appearing cervix .   Bimanual exam: Cervix closed, firm, non tender  to motion.  Uterus  firm, regular, mobile, non tender to palpation. No adnexal masses or tenderness.     Musculoskeletal:         General: No deformity. Normal range of motion.      Cervical back: Normal range of motion and neck supple.   Lymphadenopathy:      Cervical: No cervical adenopathy.   Skin:     General: Skin is warm and dry.   Neurological:      General: No focal deficit present.      Mental Status: She is alert.   Psychiatric:         Mood and Affect: Mood normal.         Behavior: Behavior normal.           Diagnostic Test Results:  Labs reviewed in Epic  Results for orders placed or performed in visit on 04/28/23   TSH     Status: Normal   Result Value Ref Range    TSH 2.60 0.30 - 4.20 uIU/mL   Magnesium     Status: Normal   Result Value Ref Range    Magnesium 1.7 1.7 - 2.3 mg/dL   Comprehensive metabolic panel (BMP + Alb, Alk Phos, ALT, AST, Total. Bili, TP)     Status: Abnormal   Result Value Ref Range    Sodium 138  136 - 145 mmol/L    Potassium 3.6 3.4 - 5.3 mmol/L    Chloride 101 98 - 107 mmol/L    Carbon Dioxide (CO2) 26 22 - 29 mmol/L    Anion Gap 11 7 - 15 mmol/L    Urea Nitrogen 13.1 6.0 - 20.0 mg/dL    Creatinine 0.56 0.51 - 0.95 mg/dL    Calcium 9.1 8.6 - 10.0 mg/dL    Glucose 64 (L) 70 - 99 mg/dL    Alkaline Phosphatase 73 35 - 104 U/L    AST 25 10 - 35 U/L    ALT 10 10 - 35 U/L    Protein Total 7.1 6.4 - 8.3 g/dL    Albumin 3.8 3.5 - 5.2 g/dL    Bilirubin Total 0.2 <=1.2 mg/dL    GFR Estimate >90 >60 mL/min/1.73m2   Lipid Profile (Chol, Trig, HDL, LDL calc)     Status: Abnormal   Result Value Ref Range    Cholesterol 179 <200 mg/dL    Triglycerides 65 <150 mg/dL    Direct Measure HDL 51 >=50 mg/dL    LDL Cholesterol Calculated 115 (H) <=100 mg/dL    Non HDL Cholesterol 128 <130 mg/dL    Narrative    Cholesterol  Desirable:  <200 mg/dL    Triglycerides  Normal:  Less than 150 mg/dL  Borderline High:  150-199 mg/dL  High:  200-499 mg/dL  Very High:  Greater than or equal to 500 mg/dL    Direct Measure HDL  Female:  Greater than or equal to 50 mg/dL   Male:  Greater than or equal to 40 mg/dL    LDL Cholesterol  Desirable:  <100mg/dL  Above Desirable:  100-129 mg/dL   Borderline High:  130-159 mg/dL   High:  160-189 mg/dL   Very High:  >= 190 mg/dL    Non HDL Cholesterol  Desirable:  130 mg/dL  Above Desirable:  130-159 mg/dL  Borderline High:  160-189 mg/dL  High:  190-219 mg/dL  Very High:  Greater than or equal to 220 mg/dL   Vitamin D Deficiency     Status: Normal   Result Value Ref Range    Vitamin D, Total (25-Hydroxy) 25 20 - 75 ug/L    Narrative    Season, race, dietary intake, and treatment affect the concentration of 25-hydroxy-Vitamin D. Values may decrease during winter months and increase during summer months. Values 20-29 ug/L may indicate Vitamin D insufficiency and values <20 ug/L may indicate Vitamin D deficiency.    Vitamin D determination is routinely performed by an immunoassay specific for 25  hydroxyvitamin D3.  If an individual is on vitamin D2(ergocalciferol) supplementation, please specify 25 OH vitamin D2 and D3 level determination by LCMSMS test [id number].     CBC with platelets and differential     Status: Abnormal   Result Value Ref Range    WBC Count 5.2 4.0 - 11.0 10e3/uL    RBC Count 3.89 3.80 - 5.20 10e6/uL    Hemoglobin 11.2 (L) 11.7 - 15.7 g/dL    Hematocrit 35.3 35.0 - 47.0 %    MCV 91 78 - 100 fL    MCH 28.8 26.5 - 33.0 pg    MCHC 31.7 31.5 - 36.5 g/dL    RDW 12.9 10.0 - 15.0 %    Platelet Count 206 150 - 450 10e3/uL    % Neutrophils 48 %    % Lymphocytes 34 %    % Monocytes 6 %    % Eosinophils 11 %    % Basophils 1 %    % Immature Granulocytes 0 %    Absolute Neutrophils 2.5 1.6 - 8.3 10e3/uL    Absolute Lymphocytes 1.7 0.8 - 5.3 10e3/uL    Absolute Monocytes 0.3 0.0 - 1.3 10e3/uL    Absolute Eosinophils 0.6 0.0 - 0.7 10e3/uL    Absolute Basophils 0.0 0.0 - 0.2 10e3/uL    Absolute Immature Granulocytes 0.0 <=0.4 10e3/uL   Iron and iron binding capacity     Status: Normal   Result Value Ref Range    Iron 38 37 - 145 ug/dL    Iron Binding Capacity 244 240 - 430 ug/dL    Iron Sat Index 16 15 - 46 %   Ferritin     Status: Normal   Result Value Ref Range    Ferritin 29 6 - 175 ng/mL   Wet prep - Clinic Collect     Status: Normal    Specimen: Vagina; Swab   Result Value Ref Range    Trichomonas Absent Absent    Yeast Absent Absent    Clue Cells Absent Absent    WBCs/high power field None None   CBC with Platelets & Differential     Status: Abnormal    Narrative    The following orders were created for panel order CBC with Platelets & Differential.  Procedure                               Abnormality         Status                     ---------                               -----------         ------                     CBC with platelets and d...[[id number]]  Abnormal            Final result                 Please view results for these tests on the individual orders.       ASSESSMENT/PLAN:   1.  Adult general medical exam  This is a 38 yo female here for physical exam     2. Hair loss  Patient has considerable hair loss/hair thinning.  She is quite concerned by this.  Last baby delivered about a year ago - it did start after that, but she is not convinced it is related to that.  Discussed that there are many reasons for hair loss, but rarely do we uncover the real cause.  Will check metabolic labs.    - TSH; Future  - Magnesium; Future  - Comprehensive metabolic panel (BMP + Alb, Alk Phos, ALT, AST, Total. Bili, TP); Future  - CBC with Platelets & Differential; Future  - TSH  - Magnesium  - Comprehensive metabolic panel (BMP + Alb, Alk Phos, ALT, AST, Total. Bili, TP)  - CBC with Platelets & Differential    3. Abdominal pain, chronic, left lower quadrant  Patient is concerned by chronic LLQ abdominal pain - vague symptoms - no diarrhea/constipation/change in stool; no blood in stool ; exam shows more diffuse (non-reproducible) tenderness, but mostly LLQ - will check CT abd/pelvis  - CT Abdomen Pelvis w Contrast; Future    4. Vaginal discharge  Small amount of discharge - will check wet prep  - Wet prep - Clinic Collect    5. History of anemia  Patient notes she has a history of anemia - will check iron/ferritin  - Iron and iron binding capacity; Future  - Ferritin; Future  - Iron and iron binding capacity  - Ferritin    6. Vitamin D deficiency  H/o Vitamin D deficiency -   - Vitamin D Deficiency; Future  - Vitamin D Deficiency    7. Need for hepatitis C screening test  Discussed recommendation for Hepatitis C screening - low risk - not ordered      8. Cervical cancer screening  Due for cervix cancer screening - pap/HPV done/sent.   - Pap Screen with HPV - recommended age 30 - 65 years    9. Lipid screening  Due for lipid screening - ordered  - Lipid Profile (Chol, Trig, HDL, LDL calc); Future  - Lipid Profile (Chol, Trig, HDL, LDL calc)    Patient has been advised of split billing requirements and indicates  "understanding: Yes      COUNSELING:  Reviewed preventive health counseling, as reflected in patient instructions       Regular exercise       Healthy diet/nutrition      BMI:   Estimated body mass index is 27.76 kg/m  as calculated from the following:    Height as of this encounter: 1.676 m (5' 6\").    Weight as of this encounter: 78 kg (172 lb).   Weight management plan: Discussed healthy diet and exercise guidelines      She reports that she has never smoked. She has never been exposed to tobacco smoke. She has never used smokeless tobacco.      SYLVIA ECHEVERRIA MD  [facility] Prior to immunization administration, verified patients identity using patient s name and date of birth. Please see Immunization Activity for additional information.     Screening Questionnaire for Adult Immunization    Are you sick today?   No   Do you have allergies to medications, food, a vaccine component or latex?   No   Have you ever had a serious reaction after receiving a vaccination?   No   Do you have a long-term health problem with heart, lung, kidney, or metabolic disease (e.g., diabetes), asthma, a blood disorder, no spleen, complement component deficiency, a cochlear implant, or a spinal fluid leak?  Are you on long-term aspirin therapy?   No   Do you have cancer, leukemia, HIV/AIDS, or any other immune system problem?   No   Do you have a parent, brother, or sister with an immune system problem?   No   In the past 3 months, have you taken medications that affect  your immune system, such as prednisone, other steroids, or anticancer drugs; drugs for the treatment of rheumatoid arthritis, Crohn s disease, or psoriasis; or have you had radiation treatments?   No   Have you had a seizure, or a brain or other nervous system problem?   No   During the past year, have you received a transfusion of blood or blood    products, or been given immune (gamma) globulin or antiviral drug?   No   For " women: Are you pregnant or is there a chance you could become       pregnant during the next month?   No   Have you received any vaccinations in the past 4 weeks?   No     Immunization questionnaire answers were all negative.        Screening performed by Lorraine Yadav MA on 4/28/2023 at 9:22 AM.

## 2023-04-28 NOTE — LETTER
May 1, 2023      Cynthia Abdul  [address] [place] [address]        Dear ,    We are writing to inform you of your test results.    Your labs are reassuring.  No sign of concern.  Iron, ferritin, Vitamin B12 are all normal.  Hemoglobin is just slightly low today .  Taking extra iron is not likely to make that better.      Resulted Orders   Wet prep - Clinic Collect   Result Value Ref Range    Trichomonas Absent Absent    Yeast Absent Absent    Clue Cells Absent Absent    WBCs/high power field None None   TSH   Result Value Ref Range    TSH 2.60 0.30 - 4.20 uIU/mL   Magnesium   Result Value Ref Range    Magnesium 1.7 1.7 - 2.3 mg/dL   Comprehensive metabolic panel (BMP + Alb, Alk Phos, ALT, AST, Total. Bili, TP)   Result Value Ref Range    Sodium 138 136 - 145 mmol/L    Potassium 3.6 3.4 - 5.3 mmol/L    Chloride 101 98 - 107 mmol/L    Carbon Dioxide (CO2) 26 22 - 29 mmol/L    Anion Gap 11 7 - 15 mmol/L    Urea Nitrogen 13.1 6.0 - 20.0 mg/dL    Creatinine 0.56 0.51 - 0.95 mg/dL    Calcium 9.1 8.6 - 10.0 mg/dL    Glucose 64 (L) 70 - 99 mg/dL    Alkaline Phosphatase 73 35 - 104 U/L    AST 25 10 - 35 U/L    ALT 10 10 - 35 U/L    Protein Total 7.1 6.4 - 8.3 g/dL    Albumin 3.8 3.5 - 5.2 g/dL    Bilirubin Total 0.2 <=1.2 mg/dL    GFR Estimate >90 >60 mL/min/1.73m2      Comment:      eGFR calculated using 2021 CKD-EPI equation.   Lipid Profile (Chol, Trig, HDL, LDL calc)   Result Value Ref Range    Cholesterol 179 <200 mg/dL    Triglycerides 65 <150 mg/dL    Direct Measure HDL 51 >=50 mg/dL    LDL Cholesterol Calculated 115 (H) <=100 mg/dL    Non HDL Cholesterol 128 <130 mg/dL    Narrative    Cholesterol  Desirable:  <200 mg/dL    Triglycerides  Normal:  Less than 150 mg/dL  Borderline High:  150-199 mg/dL  High:  200-499 mg/dL  Very High:  Greater than or equal to 500 mg/dL    Direct Measure HDL  Female:  Greater than or equal to 50 mg/dL   Male:  Greater than or equal to 40 mg/dL    LDL  Cholesterol  Desirable:  <100mg/dL  Above Desirable:  100-129 mg/dL   Borderline High:  130-159 mg/dL   High:  160-189 mg/dL   Very High:  >= 190 mg/dL    Non HDL Cholesterol  Desirable:  130 mg/dL  Above Desirable:  130-159 mg/dL  Borderline High:  160-189 mg/dL  High:  190-219 mg/dL  Very High:  Greater than or equal to 220 mg/dL   Vitamin D Deficiency   Result Value Ref Range    Vitamin D, Total (25-Hydroxy) 25 20 - 75 ug/L    Narrative    Season, race, dietary intake, and treatment affect the concentration of 25-hydroxy-Vitamin D. Values may decrease during winter months and increase during summer months. Values 20-29 ug/L may indicate Vitamin D insufficiency and values <20 ug/L may indicate Vitamin D deficiency.    Vitamin D determination is routinely performed by an immunoassay specific for 25 hydroxyvitamin D3.  If an individual is on vitamin D2(ergocalciferol) supplementation, please specify 25 OH vitamin D2 and D3 level determination by LCMSMS test [id number].     CBC with platelets and differential   Result Value Ref Range    WBC Count 5.2 4.0 - 11.0 10e3/uL    RBC Count 3.89 3.80 - 5.20 10e6/uL    Hemoglobin 11.2 (L) 11.7 - 15.7 g/dL    Hematocrit 35.3 35.0 - 47.0 %    MCV 91 78 - 100 fL    MCH 28.8 26.5 - 33.0 pg    MCHC 31.7 31.5 - 36.5 g/dL    RDW 12.9 10.0 - 15.0 %    Platelet Count 206 150 - 450 10e3/uL    % Neutrophils 48 %    % Lymphocytes 34 %    % Monocytes 6 %    % Eosinophils 11 %    % Basophils 1 %    % Immature Granulocytes 0 %    Absolute Neutrophils 2.5 1.6 - 8.3 10e3/uL    Absolute Lymphocytes 1.7 0.8 - 5.3 10e3/uL    Absolute Monocytes 0.3 0.0 - 1.3 10e3/uL    Absolute Eosinophils 0.6 0.0 - 0.7 10e3/uL    Absolute Basophils 0.0 0.0 - 0.2 10e3/uL    Absolute Immature Granulocytes 0.0 <=0.4 10e3/uL   Iron and iron binding capacity   Result Value Ref Range    Iron 38 37 - 145 ug/dL    Iron Binding Capacity 244 240 - 430 ug/dL    Iron Sat Index 16 15 - 46 %   Ferritin   Result Value Ref Range     Ferritin 29 6 - 175 ng/mL       If you have any questions or concerns, please call the clinic at the number listed above.       Sincerely,      Yoko Bravo MD

## 2023-05-01 ENCOUNTER — ANCILLARY PROCEDURE (OUTPATIENT)
Dept: CT IMAGING | Facility: CLINIC | Age: 38
End: 2023-05-01
Attending: FAMILY MEDICINE
Payer: COMMERCIAL

## 2023-05-01 DIAGNOSIS — G89.29 ABDOMINAL PAIN, CHRONIC, LEFT LOWER QUADRANT: ICD-10-CM

## 2023-05-01 DIAGNOSIS — R10.32 ABDOMINAL PAIN, CHRONIC, LEFT LOWER QUADRANT: ICD-10-CM

## 2023-05-01 PROCEDURE — 74177 CT ABD & PELVIS W/CONTRAST: CPT | Performed by: STUDENT IN AN ORGANIZED HEALTH CARE EDUCATION/TRAINING PROGRAM

## 2023-05-01 RX ORDER — IOPAMIDOL 755 MG/ML
95 INJECTION, SOLUTION INTRAVASCULAR ONCE
Status: COMPLETED | OUTPATIENT
Start: 2023-05-01 | End: 2023-05-01

## 2023-05-01 RX ORDER — HYDROQUINONE 40 MG/G
CREAM TOPICAL
COMMUNITY
Start: 2023-04-24 | End: 2024-04-04

## 2023-05-01 RX ORDER — TRETINOIN 0.025 %
CREAM (GRAM) TOPICAL
COMMUNITY
Start: 2023-04-25 | End: 2024-04-04

## 2023-05-01 RX ADMIN — IOPAMIDOL 95 ML: 755 INJECTION, SOLUTION INTRAVASCULAR at 12:58

## 2023-05-03 LAB
BKR LAB AP GYN ADEQUACY: NORMAL
BKR LAB AP GYN INTERPRETATION: NORMAL
BKR LAB AP HPV REFLEX: NORMAL
BKR LAB AP LMP: NORMAL
BKR LAB AP PREVIOUS ABNORMAL: NORMAL
PATH REPORT.COMMENTS IMP SPEC: NORMAL
PATH REPORT.COMMENTS IMP SPEC: NORMAL
PATH REPORT.RELEVANT HX SPEC: NORMAL

## 2023-05-04 LAB
HUMAN PAPILLOMA VIRUS 16 DNA: NEGATIVE
HUMAN PAPILLOMA VIRUS 18 DNA: NEGATIVE
HUMAN PAPILLOMA VIRUS FINAL DIAGNOSIS: NORMAL
HUMAN PAPILLOMA VIRUS OTHER HR: NEGATIVE

## 2023-05-05 ENCOUNTER — TELEPHONE (OUTPATIENT)
Dept: FAMILY MEDICINE | Facility: CLINIC | Age: 38
End: 2023-05-05

## 2023-05-05 NOTE — TELEPHONE ENCOUNTER
Test Results    Contacts       Type Contact Phone/Fax    05/05/2023 03:50 PM CDT Phone (Incoming) Cynthia Abdul (Self)           Who ordered the test:  All labs done on 4/28/23  Also CT scan    Type of test: Lab and CT    Date of test:  4/28/23 and 5/1/23    Where was the test performed:  Many labs and CT scan    What are your questions/concerns?:  Results    Okay to leave a detailed message?: No at Cell number on file:    Telephone Information:   Mobile [phone]     Message routed to Dr Elena for review / plan    Lissett Sheehan RN  [facility]

## 2023-05-18 ENCOUNTER — TRANSFERRED RECORDS (OUTPATIENT)
Dept: HEALTH INFORMATION MANAGEMENT | Facility: CLINIC | Age: 38
End: 2023-05-18

## 2023-07-05 ENCOUNTER — HOSPITAL ENCOUNTER (EMERGENCY)
Facility: HOSPITAL | Age: 38
Discharge: HOME | End: 2023-07-05
Attending: EMERGENCY MEDICINE | Admitting: EMERGENCY MEDICINE
Payer: COMMERCIAL

## 2023-07-05 VITALS
DIASTOLIC BLOOD PRESSURE: 62 MMHG | OXYGEN SATURATION: 100 % | WEIGHT: 170 LBS | BODY MASS INDEX: 25.76 KG/M2 | TEMPERATURE: 98.5 F | SYSTOLIC BLOOD PRESSURE: 100 MMHG | HEART RATE: 80 BPM | RESPIRATION RATE: 16 BRPM | HEIGHT: 68 IN

## 2023-07-05 DIAGNOSIS — K62.5 RECTAL BLEEDING: ICD-10-CM

## 2023-07-05 LAB
ALBUMIN SERPL BCG-MCNC: 3.7 G/DL (ref 3.5–5.2)
ALP SERPL-CCNC: 74 U/L (ref 35–104)
ALT SERPL W P-5'-P-CCNC: 11 U/L (ref 0–50)
ANION GAP SERPL CALCULATED.3IONS-SCNC: 10 MMOL/L (ref 7–15)
AST SERPL W P-5'-P-CCNC: 22 U/L (ref 0–45)
BASOPHILS # BLD AUTO: 0 10E3/UL (ref 0–0.2)
BASOPHILS NFR BLD AUTO: 1 %
BILIRUB SERPL-MCNC: 0.2 MG/DL
BUN SERPL-MCNC: 11 MG/DL (ref 6–20)
CALCIUM SERPL-MCNC: 9.2 MG/DL (ref 8.6–10)
CHLORIDE SERPL-SCNC: 105 MMOL/L (ref 98–107)
CREAT SERPL-MCNC: 0.6 MG/DL (ref 0.51–0.95)
DEPRECATED HCO3 PLAS-SCNC: 23 MMOL/L (ref 22–29)
EOSINOPHIL # BLD AUTO: 0.5 10E3/UL (ref 0–0.7)
EOSINOPHIL NFR BLD AUTO: 10 %
ERYTHROCYTE [DISTWIDTH] IN BLOOD BY AUTOMATED COUNT: 12.7 % (ref 10–15)
GFR SERPL CREATININE-BSD FRML MDRD: >90 ML/MIN/1.73M2
GLUCOSE SERPL-MCNC: 99 MG/DL (ref 70–99)
HCT VFR BLD AUTO: 33.8 % (ref 35–47)
HGB BLD-MCNC: 10.7 G/DL (ref 11.7–15.7)
HOLD SPECIMEN: NORMAL
IMM GRANULOCYTES # BLD: 0 10E3/UL
IMM GRANULOCYTES NFR BLD: 0 %
INR PPP: 1.11 (ref 0.85–1.15)
LYMPHOCYTES # BLD AUTO: 1.6 10E3/UL (ref 0.8–5.3)
LYMPHOCYTES NFR BLD AUTO: 31 %
MCH RBC QN AUTO: 28 PG (ref 26.5–33)
MCHC RBC AUTO-ENTMCNC: 31.7 G/DL (ref 31.5–36.5)
MCV RBC AUTO: 89 FL (ref 78–100)
MONOCYTES # BLD AUTO: 0.3 10E3/UL (ref 0–1.3)
MONOCYTES NFR BLD AUTO: 6 %
NEUTROPHILS # BLD AUTO: 2.6 10E3/UL (ref 1.6–8.3)
NEUTROPHILS NFR BLD AUTO: 52 %
NRBC # BLD AUTO: 0 10E3/UL
NRBC BLD AUTO-RTO: 0 /100
PLATELET # BLD AUTO: 178 10E3/UL (ref 150–450)
POTASSIUM SERPL-SCNC: 3.4 MMOL/L (ref 3.4–5.3)
PROT SERPL-MCNC: 6.8 G/DL (ref 6.4–8.3)
RBC # BLD AUTO: 3.82 10E6/UL (ref 3.8–5.2)
SODIUM SERPL-SCNC: 138 MMOL/L (ref 136–145)
WBC # BLD AUTO: 5.1 10E3/UL (ref 4–11)

## 2023-07-05 PROCEDURE — 85610 PROTHROMBIN TIME: CPT | Performed by: EMERGENCY MEDICINE

## 2023-07-05 PROCEDURE — 85025 COMPLETE CBC W/AUTO DIFF WBC: CPT | Performed by: EMERGENCY MEDICINE

## 2023-07-05 PROCEDURE — 80053 COMPREHEN METABOLIC PANEL: CPT | Performed by: EMERGENCY MEDICINE

## 2023-07-05 PROCEDURE — 36415 COLL VENOUS BLD VENIPUNCTURE: CPT | Performed by: EMERGENCY MEDICINE

## 2023-07-05 PROCEDURE — 99283 EMERGENCY DEPT VISIT LOW MDM: CPT

## 2023-07-05 ASSESSMENT — ENCOUNTER SYMPTOMS
NAUSEA: 0
BLOOD IN STOOL: 1
DIFFICULTY URINATING: 0
ANAL BLEEDING: 1
ABDOMINAL PAIN: 0
VOMITING: 0

## 2023-07-05 ASSESSMENT — ACTIVITIES OF DAILY LIVING (ADL): ADLS_ACUITY_SCORE: 35

## 2023-07-06 NOTE — ED PROVIDER NOTES
EMERGENCY DEPARTMENT ENCOUNTER      NAME: Cynthia Abdul  AGE: 37 year old female  YOB: 1985  MRN: [id number]  EVALUATION DATE & TIME: 7/5/2023  9:05 PM    PCP: No Ref-Primary, Physician    ED PROVIDER: Jorge Flores M.D.      Chief Complaint   Patient presents with     Rectal Bleeding         FINAL IMPRESSION:  1. Rectal bleeding          ED COURSE & MEDICAL DECISION MAKING:    Pertinent Labs & Imaging studies reviewed. (See chart for details)  37 year old female presents to the Emergency Department for evaluation of rectal bleeding.  Rectal bleeding.  Patient had bright red blood per rectum today.  No pain with bowel movements.  No diarrhea.  Patient deferred rectal exam so unclear exactly etiology.  Seems likely hemorrhoid or possible fissure.  Hemoglobin stable.  Vitals otherwise unremarkable.  Discussed sitz bath's.  We will follow-up with primary.  Return for worsening symptoms.  No signs of upper GI bleeding.  No indication for imaging.  Patient discharged home.    9:16 PM I met with the patient to gather history and to perform my initial exam. I discussed the plan for care while in the Emergency Department. Patient declined rectal exam.     At the conclusion of the encounter I discussed the results of all of the tests and the disposition. The questions were answered. The patient or family acknowledged understanding and was agreeable with the care plan.     Medical Decision Making    History:    Supplemental history from: Documented in chart, if applicable    External Record(s) reviewed: Documented in chart, if applicable.    Work Up:    Chart documentation includes differential considered and any EKGs or imaging independently interpreted by provider, where specified.    In additional to work up documented, I considered the following work up: Documented in chart, if applicable.    External consultation:    Discussion of management with another provider: Documented in chart, if  applicable    Complicating factors:    Care impacted by chronic illness: N/A    Care affected by social determinants of health: N/A    Disposition considerations: Discharge. No recommendations on prescription strength medication(s). N/A.      MEDICATIONS GIVEN IN THE EMERGENCY:  Medications - No data to display    NEW PRESCRIPTIONS STARTED AT TODAY'S ER VISIT  Discharge Medication List as of 7/5/2023 11:15 PM             =================================================================    HPI    Patient information was obtained from: Patient     Use of : N/A         Cynthia Abdul is a 37 year old female with no contributory history who presents to this ED via private vehicle for evaluation of rectal bleeding.    Patient reports that she noticed bright red blood present in her stool in two separate bowel movements today at ~2 PM and ~8 PM. She notes that her last period was 3 weeks ago and denies any chance of possibility. Patient denies abdominal pain, nausea, vomiting, difficulty urinating, or additional symptoms or complaints at this time.       REVIEW OF SYSTEMS   Review of Systems   Gastrointestinal: Positive for anal bleeding and blood in stool. Negative for abdominal pain, nausea and vomiting.   Genitourinary: Negative for difficulty urinating.   All other systems reviewed and are negative.       PAST MEDICAL HISTORY:  Past Medical History:   Diagnosis Date     Atypical squamous cells of undetermined significance (ASCUS) on Papanicolaou smear of cervix 03/2015     Papanicolaou smear of cervix with low grade squamous intraepithelial lesion (LGSIL) 03/02/2014     Seasonal allergies      Wheezing     no documented asthma       PAST SURGICAL HISTORY:  Past Surgical History:   Procedure Laterality Date     NO PAST SURGERIES             CURRENT MEDICATIONS:    No current facility-administered medications for this encounter.     Current Outpatient Medications   Medication     hydroquinone (ANN) 4 %  "external cream     RETIN-A 0.025 % external cream         ALLERGIES:  No Known Allergies    FAMILY HISTORY:  Family History   Problem Relation Age of Onset     No Known Problems Mother      No Known Problems Father        SOCIAL HISTORY:   Social History     Socioeconomic History     Marital status:      Spouse name: Whitley Bach     Number of children: 6   Tobacco Use     Smoking status: Never     Passive exposure: Never     Smokeless tobacco: Never   Substance and Sexual Activity     Alcohol use: Never     Drug use: Never     Sexual activity: Yes     Partners: Male     Birth control/protection: None       VITALS:  /62   Pulse 80   Temp 98.5  F (36.9  C) (Oral)   Resp 16   Ht 1.727 m (5' 8\")   Wt 77.1 kg (170 lb)   SpO2 100%   BMI 25.85 kg/m      PHYSICAL EXAM    Physical Exam  Vitals and nursing note reviewed.   Constitutional:       General: She is not in acute distress.     Appearance: She is not diaphoretic.   HENT:      Head: Atraumatic.      Mouth/Throat:      Pharynx: No oropharyngeal exudate.   Eyes:      General: No scleral icterus.     Pupils: Pupils are equal, round, and reactive to light.   Cardiovascular:      Rate and Rhythm: Normal rate and regular rhythm.      Heart sounds: Normal heart sounds.   Pulmonary:      Effort: No respiratory distress.      Breath sounds: Normal breath sounds.   Abdominal:      Palpations: Abdomen is soft.      Tenderness: There is no abdominal tenderness. There is no guarding or rebound. Negative signs include Boss's sign.   Genitourinary:     Comments: Deferred at patients request.  Musculoskeletal:         General: No tenderness.   Skin:     General: Skin is warm.      Findings: No rash.   Neurological:      General: No focal deficit present.      Mental Status: She is alert.           LAB:  All pertinent labs reviewed and interpreted.  Labs Ordered and Resulted from Time of ED Arrival to Time of ED Departure   CBC WITH PLATELETS AND " DIFFERENTIAL - Abnormal       Result Value    WBC Count 5.1      RBC Count 3.82      Hemoglobin 10.7 (*)     Hematocrit 33.8 (*)     MCV 89      MCH 28.0      MCHC 31.7      RDW 12.7      Platelet Count 178      % Neutrophils 52      % Lymphocytes 31      % Monocytes 6      % Eosinophils 10      % Basophils 1      % Immature Granulocytes 0      NRBCs per 100 WBC 0      Absolute Neutrophils 2.6      Absolute Lymphocytes 1.6      Absolute Monocytes 0.3      Absolute Eosinophils 0.5      Absolute Basophils 0.0      Absolute Immature Granulocytes 0.0      Absolute NRBCs 0.0     COMPREHENSIVE METABOLIC PANEL - Normal    Sodium 138      Potassium 3.4      Chloride 105      Carbon Dioxide (CO2) 23      Anion Gap 10      Urea Nitrogen 11.0      Creatinine 0.60      Calcium 9.2      Glucose 99      Alkaline Phosphatase 74      AST 22      ALT 11      Protein Total 6.8      Albumin 3.7      Bilirubin Total 0.2      GFR Estimate >90     INR - Normal    INR 1.11         RADIOLOGY:  Reviewed all pertinent imaging. Please see official radiology report.  No orders to display       I, Gautam Lance, am serving as a scribe to document services personally performed by Dr. Jorge Flores, based on my observation and the provider's statements to me. I, Jorge Flores MD attest that Gautam Lance is acting in a scribe capacity, has observed my performance of the services and has documented them in accordance with my direction.    Jorge Flores M.D.  Emergency Medicine  [facility] EMERGENCY DEPARTMENT  [facility] [facility] [id number] [phone]  Dept: [phone]     Jorge Flores MD  07/06/23 0202

## 2023-07-06 NOTE — ED TRIAGE NOTES
Pt arrives from home for evaluation of 2 episodes of blood in her stool. Pt denies any hx of hemorrhoids, denies straining. No previous hx of rectal bleeding. Denies blood thinners. No abd pain.      Triage Assessment     Row Name 07/05/23 2045       Triage Assessment (Adult)    Airway WDL WDL       Respiratory WDL    Respiratory WDL WDL       Skin Circulation/Temperature WDL    Skin Circulation/Temperature WDL WDL       Cardiac WDL    Cardiac WDL WDL       Peripheral/Neurovascular WDL    Peripheral Neurovascular WDL WDL       Cognitive/Neuro/Behavioral WDL    Cognitive/Neuro/Behavioral WDL WDL

## 2024-01-10 ENCOUNTER — OFFICE VISIT (OUTPATIENT)
Dept: FAMILY MEDICINE | Facility: CLINIC | Age: 39
End: 2024-01-10
Payer: COMMERCIAL

## 2024-01-10 VITALS
HEART RATE: 79 BPM | OXYGEN SATURATION: 100 % | DIASTOLIC BLOOD PRESSURE: 80 MMHG | SYSTOLIC BLOOD PRESSURE: 123 MMHG | TEMPERATURE: 97.7 F | RESPIRATION RATE: 16 BRPM

## 2024-01-10 DIAGNOSIS — N92.6 MISSED PERIOD: ICD-10-CM

## 2024-01-10 DIAGNOSIS — J06.9 VIRAL UPPER RESPIRATORY TRACT INFECTION: Primary | ICD-10-CM

## 2024-01-10 LAB — HCG UR QL: NEGATIVE

## 2024-01-10 PROCEDURE — 81025 URINE PREGNANCY TEST: CPT | Performed by: FAMILY MEDICINE

## 2024-01-10 PROCEDURE — 99213 OFFICE O/P EST LOW 20 MIN: CPT | Performed by: FAMILY MEDICINE

## 2024-01-10 RX ORDER — ECHINACEA PURPUREA EXTRACT 125 MG
TABLET ORAL
Qty: 30 ML | Refills: 0 | Status: SHIPPED | OUTPATIENT
Start: 2024-01-10 | End: 2024-08-02

## 2024-01-10 RX ORDER — LORATADINE 10 MG/1
10 TABLET ORAL DAILY
Qty: 30 TABLET | Refills: 0 | Status: SHIPPED | OUTPATIENT
Start: 2024-01-10 | End: 2024-08-02

## 2024-01-10 NOTE — PATIENT INSTRUCTIONS
Pregnancy test is negative. If period does not come then repeat test in a few days     Tylenol if needed for fever.   Steam, nasal saline humidifier for nasal congestion.     A cold or allergy medication like claritin/loratidine may help with the runny nose and sneeze - take once daily.    May apply vaseline around the nose for irritation.

## 2024-01-11 NOTE — PROGRESS NOTES
Assessment/Plan:   1. Viral upper respiratory tract infection  - loratadine (CLARITIN) 10 MG tablet; Take 1 tablet (10 mg) by mouth daily  Dispense: 30 tablet; Refill: 0  - sodium chloride (OCEAN) 0.65 % nasal spray; Spray in nose as needed for irritation and congestion and dryness.  Dispense: 30 mL; Refill: 0    Acute respiratory illness with runny nose sneezing itchy eyes and fever for 2 days.  Likely a viral respiratory infection.  There is some chapped skin around the nose.  No sign of a secondary bacterial infection.  She has travel coming up.  She declined COVID test.  Plan:  Tylenol if needed for fever.   Steam, nasal saline humidifier for nasal congestion.   Drink plenty of fluids.  Rest is able  A cold or allergy medication like claritin/loratidine may help with the runny nose and sneeze - take once daily.  May apply vaseline around the nose for irritation.   Recheck with clinic at your destination if worse or new concerns.    2. Missed period  - HCG qualitative urine  Menses is 2 days late. Is traveling in a day or two, wants pregnancy test.   Pregnancy test is negative. If period does not come then repeat test in a few days     I discussed red flag symptoms, return precautions to clinic/ER and follow up care with patient/parent.  Expected clinical course, symptomatic cares advised. Questions answered. Patient/parent amenable with plan.      Subjective:     Cynthia Abdul is a 38 year old female who presents for evaluation of nasal congestion.  Onset of illness 2 days ago with runny nose, itchy eyes and ears, sneezing. She has had fever off and on as well.  No cough.  No wheeze or shortness of breath.  No nausea vomiting or diarrhea.  No significant headache or sinus pain or pressure.  Her nose has gotten chapped around the outside from frequent drainage.  And wiping her nose.  Her menses is a few days late.  No symptoms of pregnancy.  She will be traveling to [facility] in a few days.    No Known  Allergies  Current Outpatient Medications   Medication    loratadine (CLARITIN) 10 MG tablet    sodium chloride (OCEAN) 0.65 % nasal spray    hydroquinone (ANN) 4 % external cream    RETIN-A 0.025 % external cream     No current facility-administered medications for this visit.     Patient Active Problem List   Diagnosis    Varicose veins during pregnancy, antepartum    Postpartum hemorrhage, unspecified type     (normal spontaneous vaginal delivery)    ASCUS of cervix with negative high risk HPV       Objective:     /80   Pulse 79   Temp 97.7  F (36.5  C) (Tympanic)   Resp 16   SpO2 100%     Physical    General Appearance: Alert, pleasant, no distress, aVSS  Head: Normocephalic, without obvious abnormality, atraumatic  Eyes: Conjunctivae are normal.  Ears: Normal TMs and external ear canals, both ears  Nose: No significant congestion.  Clear rhinorrhea, mild chapping of the external skin around both nares, no crusting or infection  Throat: Throat is normal.  No exudate.  No vesicular lesions  Neck: No adenopathy  Lungs: Clear to auscultation bilaterally, respirations unlabored  Heart: Regular rate and rhythm  Skin: As above, no other  Psychiatric: Patient has a normal mood and affect.         Results for orders placed or performed in visit on 01/10/24   HCG qualitative urine     Status: Normal   Result Value Ref Range    hCG Urine Qualitative Negative Negative       This note has been dictated in part using voice recognition software.  Any grammatical or context distortions are unintentional and inherent to the software.  Please feel free to contact me directly for clarification if needed.

## 2024-03-11 ENCOUNTER — HOSPITAL ENCOUNTER (EMERGENCY)
Facility: HOSPITAL | Age: 39
Discharge: HOME | End: 2024-03-12
Attending: EMERGENCY MEDICINE | Admitting: EMERGENCY MEDICINE
Payer: COMMERCIAL

## 2024-03-11 ENCOUNTER — NURSE TRIAGE (OUTPATIENT)
Dept: FAMILY MEDICINE | Facility: CLINIC | Age: 39
End: 2024-03-11
Payer: COMMERCIAL

## 2024-03-11 DIAGNOSIS — O21.0 HYPEREMESIS GRAVIDARUM: ICD-10-CM

## 2024-03-11 DIAGNOSIS — O21.9 NAUSEA AND VOMITING DURING PREGNANCY: Primary | ICD-10-CM

## 2024-03-11 DIAGNOSIS — N30.00 ACUTE CYSTITIS WITHOUT HEMATURIA: ICD-10-CM

## 2024-03-11 LAB
ALBUMIN SERPL BCG-MCNC: 3.3 G/DL (ref 3.5–5.2)
ALBUMIN UR-MCNC: NEGATIVE MG/DL
ALP SERPL-CCNC: 98 U/L (ref 40–150)
ALT SERPL W P-5'-P-CCNC: 16 U/L (ref 0–50)
AMORPH CRY #/AREA URNS HPF: ABNORMAL /HPF
ANION GAP SERPL CALCULATED.3IONS-SCNC: 8 MMOL/L (ref 7–15)
APPEARANCE UR: ABNORMAL
AST SERPL W P-5'-P-CCNC: 17 U/L (ref 0–45)
BACTERIA #/AREA URNS HPF: ABNORMAL /HPF
BILIRUB SERPL-MCNC: 0.2 MG/DL
BILIRUB UR QL STRIP: NEGATIVE
BUN SERPL-MCNC: 4.4 MG/DL (ref 6–20)
CALCIUM SERPL-MCNC: 8.8 MG/DL (ref 8.6–10)
CHLORIDE SERPL-SCNC: 103 MMOL/L (ref 98–107)
COLOR UR AUTO: ABNORMAL
CREAT SERPL-MCNC: 0.47 MG/DL (ref 0.51–0.95)
DEPRECATED HCO3 PLAS-SCNC: 23 MMOL/L (ref 22–29)
EGFRCR SERPLBLD CKD-EPI 2021: >90 ML/MIN/1.73M2
ERYTHROCYTE [DISTWIDTH] IN BLOOD BY AUTOMATED COUNT: 13.6 % (ref 10–15)
GLUCOSE SERPL-MCNC: 92 MG/DL (ref 70–99)
GLUCOSE UR STRIP-MCNC: NEGATIVE MG/DL
HCT VFR BLD AUTO: 32.8 % (ref 35–47)
HGB BLD-MCNC: 10.5 G/DL (ref 11.7–15.7)
HGB UR QL STRIP: NEGATIVE
KETONES UR STRIP-MCNC: NEGATIVE MG/DL
LEUKOCYTE ESTERASE UR QL STRIP: ABNORMAL
MCH RBC QN AUTO: 26.4 PG (ref 26.5–33)
MCHC RBC AUTO-ENTMCNC: 32 G/DL (ref 31.5–36.5)
MCV RBC AUTO: 82 FL (ref 78–100)
NITRATE UR QL: NEGATIVE
PH UR STRIP: 7.5 [PH] (ref 5–7)
PLATELET # BLD AUTO: 229 10E3/UL (ref 150–450)
POTASSIUM SERPL-SCNC: 3.7 MMOL/L (ref 3.4–5.3)
PROT SERPL-MCNC: 6.8 G/DL (ref 6.4–8.3)
RBC # BLD AUTO: 3.98 10E6/UL (ref 3.8–5.2)
RBC URINE: 0 /HPF
SODIUM SERPL-SCNC: 134 MMOL/L (ref 135–145)
SP GR UR STRIP: 1.02 (ref 1–1.03)
SQUAMOUS EPITHELIAL: 8 /HPF
UROBILINOGEN UR STRIP-MCNC: <2 MG/DL
WBC # BLD AUTO: 6.1 10E3/UL (ref 4–11)
WBC URINE: 10 /HPF

## 2024-03-11 PROCEDURE — 250N000011 HC RX IP 250 OP 636: Performed by: EMERGENCY MEDICINE

## 2024-03-11 PROCEDURE — 80053 COMPREHEN METABOLIC PANEL: CPT | Performed by: EMERGENCY MEDICINE

## 2024-03-11 PROCEDURE — 258N000003 HC RX IP 258 OP 636: Performed by: EMERGENCY MEDICINE

## 2024-03-11 PROCEDURE — 250N000013 HC RX MED GY IP 250 OP 250 PS 637: Performed by: EMERGENCY MEDICINE

## 2024-03-11 PROCEDURE — 96374 THER/PROPH/DIAG INJ IV PUSH: CPT

## 2024-03-11 PROCEDURE — 85027 COMPLETE CBC AUTOMATED: CPT | Performed by: EMERGENCY MEDICINE

## 2024-03-11 PROCEDURE — 96361 HYDRATE IV INFUSION ADD-ON: CPT

## 2024-03-11 PROCEDURE — 99284 EMERGENCY DEPT VISIT MOD MDM: CPT | Mod: 25

## 2024-03-11 PROCEDURE — 96375 TX/PRO/DX INJ NEW DRUG ADDON: CPT

## 2024-03-11 PROCEDURE — 81001 URINALYSIS AUTO W/SCOPE: CPT | Performed by: EMERGENCY MEDICINE

## 2024-03-11 PROCEDURE — 36415 COLL VENOUS BLD VENIPUNCTURE: CPT | Performed by: EMERGENCY MEDICINE

## 2024-03-11 RX ORDER — CEPHALEXIN 500 MG/1
500 CAPSULE ORAL 2 TIMES DAILY
Qty: 6 CAPSULE | Refills: 0 | Status: SHIPPED | OUTPATIENT
Start: 2024-03-11 | End: 2024-03-14

## 2024-03-11 RX ORDER — POLYETHYLENE GLYCOL 3350 17 G/17G
1 POWDER, FOR SOLUTION ORAL DAILY
Qty: 578 G | Refills: 0 | Status: SHIPPED | OUTPATIENT
Start: 2024-03-11 | End: 2024-04-14

## 2024-03-11 RX ORDER — CEPHALEXIN 500 MG/1
500 CAPSULE ORAL ONCE
Status: COMPLETED | OUTPATIENT
Start: 2024-03-12 | End: 2024-03-11

## 2024-03-11 RX ORDER — ONDANSETRON 4 MG/1
4-8 TABLET, ORALLY DISINTEGRATING ORAL EVERY 8 HOURS PRN
Qty: 30 TABLET | Refills: 0 | Status: SHIPPED | OUTPATIENT
Start: 2024-03-11 | End: 2024-08-02

## 2024-03-11 RX ORDER — METOCLOPRAMIDE HYDROCHLORIDE 5 MG/ML
10 INJECTION INTRAMUSCULAR; INTRAVENOUS ONCE
Status: COMPLETED | OUTPATIENT
Start: 2024-03-11 | End: 2024-03-11

## 2024-03-11 RX ADMIN — CEPHALEXIN 500 MG: 500 CAPSULE ORAL at 23:47

## 2024-03-11 RX ADMIN — SODIUM CHLORIDE 1000 ML: 9 INJECTION, SOLUTION INTRAVENOUS at 22:20

## 2024-03-11 RX ADMIN — METOCLOPRAMIDE 10 MG: 5 INJECTION, SOLUTION INTRAMUSCULAR; INTRAVENOUS at 21:55

## 2024-03-11 RX ADMIN — FAMOTIDINE 20 MG: 10 INJECTION, SOLUTION INTRAVENOUS at 22:24

## 2024-03-11 RX ADMIN — SODIUM CHLORIDE 1000 ML: 9 INJECTION, SOLUTION INTRAVENOUS at 21:55

## 2024-03-11 ASSESSMENT — COLUMBIA-SUICIDE SEVERITY RATING SCALE - C-SSRS
2. HAVE YOU ACTUALLY HAD ANY THOUGHTS OF KILLING YOURSELF IN THE PAST MONTH?: NO
1. IN THE PAST MONTH, HAVE YOU WISHED YOU WERE DEAD OR WISHED YOU COULD GO TO SLEEP AND NOT WAKE UP?: NO
6. HAVE YOU EVER DONE ANYTHING, STARTED TO DO ANYTHING, OR PREPARED TO DO ANYTHING TO END YOUR LIFE?: NO

## 2024-03-11 ASSESSMENT — ACTIVITIES OF DAILY LIVING (ADL)
ADLS_ACUITY_SCORE: 35
ADLS_ACUITY_SCORE: 35

## 2024-03-11 NOTE — TELEPHONE ENCOUNTER
Provider Response to 2nd Level Triage Request    I have {triage review response:759605}. My recommendation is:  {Triage visit type:634330}

## 2024-03-11 NOTE — TELEPHONE ENCOUNTER
Nurse Triage SBAR    Is this a 2nd Level Triage? YES, LICENSED PRACTITIONER REVIEW IS REQUIRED    Situation: nausea and vomiting     Background: symptoms started since February    Assessment:   - nausea and vomiting 6-7 times a day  - cannot keep fluids down. Every time she drinks, she vomits   - no fever or other symptoms.  - pt recently traveled to [facility] on 2/4/24 and returned on 3/2/24.  - pt stated a doctor in [facility] prescribed medication to help with nausea and vomiting but it did not help. Pt does not know name of rx.  - pt is about 9-10 weeks pregnant     Protocol Recommended Disposition:   Go To ED/UCC Now (Or To Office With PCP Approval)    Recommendation:  recommended pt go to ED or urgent care. Pt refused as she stated she does not have anyone taking care of her kids.       Pt has RN OB intake on 3/14 and will see Dr. Cadena on 3/20. Pt's previous OB provider was Dr. Castillo      Routed to provider    Does the patient meet one of the following criteria for ADS visit consideration? 16+ years old, with an [facility] PCP     TIP  Providers, please consider if this condition is appropriate for management at one of our Acute and Diagnostic Services sites.     If patient is a good candidate, please use dotphrase <dot>triageresponse and select Refer to ADS to document.        Layton Holden, BSN KRYSTIAN  [facility]      Reason for Disposition   SEVERE vomiting (e.g., > 6 times / day) and present > 8 hours    Additional Information   Negative: Sounds like a life-threatening emergency to the triager   Negative: Vaginal bleeding and pregnant < 20 weeks   Negative: Abdominal pain and pregnant < 20 weeks   Negative: Vomiting and pregnant 20 or more weeks   Negative: Headache is main symptom   Negative: Vomiting red blood or black (coffee ground) material   Negative: Insulin-dependent diabetes (Type I) and glucose > 400 mg/dL (22 mmol/L)   Negative: Recent head injury (within last 3 days)    "Negative: Recent abdominal injury (within last 3 days)   Negative: Severe pain in one eye    Answer Assessment - Initial Assessment Questions  1. VOMITING SEVERITY: \"How many times have you vomited in the past 24 hours?\"      - NONE     - MILD:  1 - 2 times/day     - MODERATE: 3 - 5 times/day, decreased oral intake without significant weight loss or symptoms of dehydration     - SEVERE: 6 or more times/day, vomits everything or nearly everything, with significant weight loss, symptoms of dehydration       6-7 times    2. ONSET: \"When did the vomiting begin?\"       Since February     3. FLUIDS: \"What fluids or food have you vomited up today?\" \"Are you able to keep any liquids down?\"      Unable liquid down    4. TREATMENT: \"What have you been doing so far to treat this?\"       Pt took rx that was prescribe a doctor from [facility]      5. DEHYDRATION: \"When was the last time you urinated?\" \"Are you feeling lightheaded?\" \"Weight loss?\"      Today at 1 pm    6. PREGNANCY: \"How many weeks pregnant are you?\" \"How has the pregnancy been going?\"      Around 9-10 weeks    7. PAULETTE: \"What date are you expecting to deliver?\"      October 8. MEDICINES: \"What medicines are you taking?\" (e.g., prenatal vitamins, iron, vitamin B6)      No    9. OTHER SYMPTOMS: \"Do you have any other symptoms?\"      no    Protocols used: Pregnancy - Morning Sickness (Nausea and Vomiting of Pregnancy)-A-OH    "

## 2024-03-11 NOTE — TELEPHONE ENCOUNTER
Reviewed RN documentation. I agree that she should go to urgent care or the ED. If she declines both of those, she could be appropriate to go to ADS tomorrow, though this would not be my first recommendation. I will also send ondansetron to the pharmacy so that she can try that at home but I strongly recommend that she be seen in person as she may be dehydrated and need IV fluids. The ondansetron will be a 4mg disintegrating tablet and she can take 1-2 tablets every 8 hours as needed.    If she declines being seen in UC/ED, could add on to my schedule tomorrow as well.    Simon Vasquez MD  Family Medicine with Obstetrics  [facility]  03/11/24  4:40 PM      Called ADS for recommendation.  Jodi MARTÍNEZ reviewed chart and stated patient is not appropriate for ADS due to recently returned from a oversea trip and 9-10 weeks pregnant without being seen by a provider. ADS recommended patient be seen at ER as recommended for appropriate treatment.  RN contacted patient to relay provider recommendation.  Patient hesitate on going to the urgent care/ED.  After some encouragement and health risk, patient agreed to go. Patient will also  medication from pharmacy (pharmacy medication sent to provided). Will need to find  for her children and then go to the ED.    URBAN Lakhani, RN  [facility]

## 2024-03-11 NOTE — TELEPHONE ENCOUNTER
Caller reporting the following red-flag symptom(s): nausea and vomiting, feeling ill during pregnancy- 9 weeks GA    Per the system red-flag symptom policy, patient was instructed to:  speak with a Registered Nurse    Action:  Message sent to the clinic     Couldn't reach anyone on red flag triage line. Please reach back out to pt. Pt is scheduled for first prenatal visits.

## 2024-03-12 VITALS
SYSTOLIC BLOOD PRESSURE: 103 MMHG | RESPIRATION RATE: 16 BRPM | OXYGEN SATURATION: 100 % | TEMPERATURE: 98.5 F | HEART RATE: 80 BPM | DIASTOLIC BLOOD PRESSURE: 63 MMHG

## 2024-03-12 NOTE — DISCHARGE INSTRUCTIONS
You were seen in the Emergency Department today for nausea and vomiting.    Like we talked about, you had some bacteria in your urine and since you're pregnant I would like to start you on an antibiotic.     You can continue to use the zofran as needed for nausea. Try to drink water in small amounts.        Please return to the ER if you experience inability to keep fluids down, fever not better with tylenol/motrin, and/or for any other new or concerning symptoms, otherwise please follow up with your OB doctor for recheck.     Below is some information you might find useful.     Thank you for choosing [facility]. It was a pleasure taking care of you today!  - Dr. Eunice Hdz

## 2024-03-12 NOTE — ED PROVIDER NOTES
EMERGENCY DEPARTMENT ENCOUNTER      NAME: Cynthia Abdul  YOB: 1985  MRN: [id number]    FINAL IMPRESSION  1. Hyperemesis gravidarum    2. Acute cystitis without hematuria        MEDICAL DECISION MAKING   Pertinent Labs & Imaging studies reviewed. (See chart for details)    Cynthia Abdul is a 38 year old female () who presents for evaluation of nausea and vomiting in pregnancy.  Records reviewed.  LMP 2024.  Patient has a history of  x7.  She placed a phone call to nurse triage earlier today reporting ongoing nausea and vomiting.  It was noted that she had been in [place] -3/2/24 and while there, was given a prescription for a medication to help with her symptoms but did not recall the name.  Family medicine provider did send a prescription for Zofran to the pharmacy but she was advised to come to the ED for further evaluation.    Patient reports that she is about 10 weeks pregnant and has been dealing with persistent nausea and vomiting for the last 6 to 7 days.  She states that she has not been able to keep any food or fluids down and did not have any relief with the Zofran she has tried using.  She has an appointment scheduled to establish care again with her OB/GYN on 3/20/2024 but has not yet seen them during this pregnancy.  Patient has not had any associated abdominal pain, fever, urinary symptoms, diarrhea, vaginal bleeding or loss of fluid.  She did not struggle with hyperemesis during her previous pregnancies.  She does not believe she has been around anyone with any similar symptoms.  No history of abdominal surgeries.  No other new complaints.  Vitals on arrival stable.  Remainder of history and physical exam, as below.    I considered a broad differential including but not limited to hyperemesis gravidarum, gastroparesis, cyclical vomiting syndrome, GERD, gastritis, electrolyte derangement, acute kidney injury.  Patient has a benign abdominal exam so I do have low  suspicion for obstruction or acute intra-abdominal/surgical process.  Additionally, she has not had any vaginal bleeding or loss of fluid to suggest complication related to pregnancy itself.  Discussed options for workup and management.  We agreed on plan to check basic labs, UA and focus on management of symptoms with IV fluids, Reglan, and Pepcid.  If she has improvement in symptoms, will hold on imaging.     CMP reassuring. No evidence of LARRY, acidosis, or significant electrolyte derangement. No acute elevation of bilirubin or transaminates to suggest acute hepatobiliary process. CBC notable for hemoglobin of 10.5 which I suspect is chronic.  No leukocytosis.  UA did reveal 10 WBCs and few bacteria.  Although patient is not complaining of any urinary symptoms, given she is currently pregnant, will plan to start antibiotics and send for culture.    I rechecked the patient multiple times.  She had significant improvement in symptoms with initial interventions.  I did offer a second liter of fluid given how much she has been vomiting and she was grateful for this option.  After 2 L of fluid, patient was feeling much improved and was eager to go home.  We did discuss results of UA today and I recommended that we start antibiotics.  Given time of day, we agreed on plan to give first dose here.  Patient tolerated this without any difficulty and has also been able to drink some water.  She already has a prescription for Zofran at home and I encouraged her to use this, drink of fluid frequently OB/GYN/PCP as planned or sooner if she has recurrence of symptoms.    Strict return precautions and follow up recommendations were discussed and all questions were answered. Patient endorsed understanding and was in agreement with plan.      Medical Decision Making  Obtained supplemental history:Supplemental history obtained?: No  Reviewed external records: External records reviewed?: Documented in chart  Care impacted by chronic  illness:N/A  Care significantly affected by social determinants of health:Access to Medical Care  Did you consider but not order tests?: Work up considered but not performed and documented in chart, if applicable  Did you interpret images independently?: Independent interpretation of ECG and images noted in documentation, when applicable.  Consultation discussion with other provider:Did you involve another provider (consultant, , pharmacy, etc.)?: No  Discharge. I prescribed additional prescription strength medication(s) as charted. I considered admission, but discharged patient after significant clinical improvement.      ED COURSE  10:13 PM I met with the patient, obtained history, performed an initial exam, and discussed options and plan for diagnostics and treatment here in the ED.  11:52 PM I updated patient.    MEDICATIONS GIVEN IN THE ED  Medications   sodium chloride 0.9% BOLUS 1,000 mL (0 mLs Intravenous Stopped 3/11/24 2221)   metoclopramide (REGLAN) injection 10 mg (10 mg Intravenous $Given 3/11/24 2155)   sodium chloride 0.9% BOLUS 1,000 mL (0 mLs Intravenous Stopped 3/11/24 2255)   famotidine (PEPCID) injection 20 mg (20 mg Intravenous $Given 3/11/24 2224)   cephALEXin (KEFLEX) capsule 500 mg (500 mg Oral $Given 3/11/24 2347)       NEW PRESCRIPTIONS STARTED AT TODAY'S VISIT  Discharge Medication List as of 3/11/2024 11:57 PM        START taking these medications    Details   cephALEXin (KEFLEX) 500 MG capsule Take 1 capsule (500 mg) by mouth 2 times daily for 3 days, Disp-6 capsule, R-0, Local Print      polyethylene glycol (MIRALAX) 17 GM/Dose powder Take 17 g (1 Capful) by mouth daily for 34 days, Disp-578 g, R-0, Local Print                =================================================================    Chief Complaint   Patient presents with    Hyperemesis         HPI:    Patient information was obtained from: Patient    Use of : N/A     Cynthia Abdul is a 38 year old female with a  pertinent history of  who presents to the emergency department via walk-in for evaluation of vomiting.    Patient reports persisted nausea and vomiting since the end of february. She is approximately 10 weeks pregnant and this is her 8th pregnancy. She has 7 children at home. She has not seen an OB for this pregnancy yet but she does have an upcoming OB appointment on the 3/20. She has been taking Zofran for her nausea. She endorses intermittent abdominal cramping. She currently is not taking any prenatal vitamins. Patient denies fevers, cough, chest pain, shortness of breath, vaginal bleeding,       RELEVANT HISTORY, MEDICATIONS, & ALLERGIES   Past medical history, surgical history, family history, medications, and allergies reviewed and pertinent noted in HPI.    REVIEW OF SYSTEMS:  A complete review of systems was performed with pertinent positives and negatives noted in the HPI. All other systems negative.     PHYSICAL EXAM:    Vitals: /63   Pulse 80   Temp 98.5  F (36.9  C) (Temporal)   Resp 16   LMP 2024   SpO2 100%    General: Alert and interactive, comfortable appearing.  HENT: Atraumatic. Full AROM of neck. Conjunctiva clear.   Cardiovascular: Regular rate and rhythm.   Chest/Pulmonary: Normal work of breathing. Speaking in complete sentences. Lungs CTAB. No chest wall tenderness or deformities.  Abdomen: Soft, nondistended. Nontender without guarding or rebound.  Extremities: Normal AROM of all major joints.  Skin: Warm and dry. Normal skin color.   Neuro: Speech clear. CNs grossly intact. Moves all extremities spontaneously.   Psych: Normal affect/mood, cooperative, memory appropriate.      LAB  Labs Ordered and Resulted from Time of ED Arrival to Time of ED Departure   COMPREHENSIVE METABOLIC PANEL - Abnormal       Result Value    Sodium 134 (*)     Potassium 3.7      Carbon Dioxide (CO2) 23      Anion Gap 8      Urea Nitrogen 4.4 (*)     Creatinine 0.47 (*)     GFR Estimate >90       Calcium 8.8      Chloride 103      Glucose 92      Alkaline Phosphatase 98      AST 17      ALT 16      Protein Total 6.8      Albumin 3.3 (*)     Bilirubin Total 0.2     CBC WITH PLATELETS - Abnormal    WBC Count 6.1      RBC Count 3.98      Hemoglobin 10.5 (*)     Hematocrit 32.8 (*)     MCV 82      MCH 26.4 (*)     MCHC 32.0      RDW 13.6      Platelet Count 229     ROUTINE UA WITH MICROSCOPIC REFLEX TO CULTURE - Abnormal    Color Urine Light Yellow      Appearance Urine Turbid (*)     Glucose Urine Negative      Bilirubin Urine Negative      Ketones Urine Negative      Specific Gravity Urine 1.017      Blood Urine Negative      pH Urine 7.5 (*)     Protein Albumin Urine Negative      Urobilinogen Urine <2.0      Nitrite Urine Negative      Leukocyte Esterase Urine 75 Maria Luz/uL (*)     Bacteria Urine Few (*)     Amorphous Crystals Urine Moderate (*)     RBC Urine 0      WBC Urine 10 (*)     Squamous Epithelials Urine 8 (*)          I, Velma Jacobo, am serving as a scribe to document services personally performed by Dr. Eunice Hdz based on my observation and the provider's statements to me. I, Eunice Hdz MD attest that Velma Jacobo is acting in a scribe capacity, has observed my performance of the services and has documented them in accordance with my direction.    Eunice Hdz M.D.  Emergency Medicine  [facility] EMERGENCY DEPARTMENT  [facility] [facility] [address]  [phone]  Dept: [phone]         Eunice Hdz MD  03/12/24 [id number]

## 2024-03-12 NOTE — ED TRIAGE NOTES
"    Patient arrives c/o emesis x 2 months.  States she believes she is 10 weeks pregnant.  Has not seen OB yet.  LMP \"beginning of January.\"  C/o upper abdominal discomfort from vomiting.   "

## 2024-03-14 ENCOUNTER — VIRTUAL VISIT (OUTPATIENT)
Dept: FAMILY MEDICINE | Facility: CLINIC | Age: 39
End: 2024-03-14
Payer: COMMERCIAL

## 2024-03-14 DIAGNOSIS — O09.519 SUPERVISION OF HIGH-RISK PREGNANCY OF ELDERLY PRIMIGRAVIDA: Primary | ICD-10-CM

## 2024-03-14 DIAGNOSIS — Z34.80 PRENATAL CARE, SUBSEQUENT PREGNANCY, UNSPECIFIED TRIMESTER: ICD-10-CM

## 2024-03-14 DIAGNOSIS — Z11.3 SCREEN FOR STD (SEXUALLY TRANSMITTED DISEASE): ICD-10-CM

## 2024-03-14 DIAGNOSIS — O09.40 ENCOUNTER FOR SUPERVISION OF HIGH RISK PREGNANCY WITH GRAND MULTIPARITY, ANTEPARTUM: ICD-10-CM

## 2024-03-14 LAB
ABO/RH(D): NORMAL
ANTIBODY SCREEN: NEGATIVE
SPECIMEN EXPIRATION DATE: NORMAL

## 2024-03-14 RX ORDER — PRENATAL VIT/IRON FUM/FOLIC AC 27MG-0.8MG
1 TABLET ORAL DAILY
Qty: 90 TABLET | Refills: 3 | Status: SHIPPED | OUTPATIENT
Start: 2024-03-14 | End: 2024-07-10
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
## 2024-03-15 ENCOUNTER — HOSPITAL ENCOUNTER (OUTPATIENT)
Dept: ULTRASOUND IMAGING | Facility: HOSPITAL | Age: 39
Discharge: HOME | End: 2024-03-15
Attending: FAMILY MEDICINE | Admitting: FAMILY MEDICINE
Payer: COMMERCIAL

## 2024-03-15 ENCOUNTER — LAB (OUTPATIENT)
Dept: LAB | Facility: CLINIC | Age: 39
End: 2024-03-15
Payer: COMMERCIAL

## 2024-03-15 DIAGNOSIS — O09.519 SUPERVISION OF HIGH-RISK PREGNANCY OF ELDERLY PRIMIGRAVIDA: ICD-10-CM

## 2024-03-15 DIAGNOSIS — Z11.3 SCREEN FOR STD (SEXUALLY TRANSMITTED DISEASE): ICD-10-CM

## 2024-03-15 LAB
AMORPH CRY #/AREA URNS HPF: ABNORMAL /HPF
BACTERIA #/AREA URNS HPF: ABNORMAL /HPF
RBC #/AREA URNS AUTO: ABNORMAL /HPF
RUBV IGG SERPL QL IA: 6.38 INDEX
RUBV IGG SERPL QL IA: POSITIVE
SQUAMOUS #/AREA URNS AUTO: ABNORMAL /LPF
T PALLIDUM AB SER QL: NONREACTIVE
WBC #/AREA URNS AUTO: ABNORMAL /HPF

## 2024-03-15 PROCEDURE — 87491 CHLMYD TRACH DNA AMP PROBE: CPT

## 2024-03-15 PROCEDURE — 86803 HEPATITIS C AB TEST: CPT

## 2024-03-15 PROCEDURE — 86850 RBC ANTIBODY SCREEN: CPT

## 2024-03-15 PROCEDURE — 86900 BLOOD TYPING SEROLOGIC ABO: CPT

## 2024-03-15 PROCEDURE — 86780 TREPONEMA PALLIDUM: CPT

## 2024-03-15 PROCEDURE — 99000 SPECIMEN HANDLING OFFICE-LAB: CPT

## 2024-03-15 PROCEDURE — 87086 URINE CULTURE/COLONY COUNT: CPT

## 2024-03-15 PROCEDURE — 87591 N.GONORRHOEAE DNA AMP PROB: CPT

## 2024-03-15 PROCEDURE — 36415 COLL VENOUS BLD VENIPUNCTURE: CPT

## 2024-03-15 PROCEDURE — 87389 HIV-1 AG W/HIV-1&-2 AB AG IA: CPT

## 2024-03-15 PROCEDURE — 76801 OB US < 14 WKS SINGLE FETUS: CPT

## 2024-03-15 PROCEDURE — 81015 MICROSCOPIC EXAM OF URINE: CPT

## 2024-03-15 PROCEDURE — 86762 RUBELLA ANTIBODY: CPT

## 2024-03-15 PROCEDURE — 83655 ASSAY OF LEAD: CPT | Mod: 90

## 2024-03-15 PROCEDURE — 87340 HEPATITIS B SURFACE AG IA: CPT

## 2024-03-15 PROCEDURE — 86901 BLOOD TYPING SEROLOGIC RH(D): CPT

## 2024-03-16 LAB
C TRACH DNA SPEC QL PROBE+SIG AMP: NEGATIVE
HBV SURFACE AG SERPL QL IA: NONREACTIVE
HCV AB SERPL QL IA: NONREACTIVE
HIV 1+2 AB+HIV1 P24 AG SERPL QL IA: NONREACTIVE
N GONORRHOEA DNA SPEC QL NAA+PROBE: NEGATIVE

## 2024-03-17 LAB — BACTERIA UR CULT: NORMAL

## 2024-03-18 LAB — LEAD BLDV-MCNC: <2 UG/DL

## 2024-03-31 NOTE — TELEPHONE ENCOUNTER
Karishma notified per PCP note below. Asking about form regarding multivitamin (Hyperemesis Order Form) if doctor received (faxed 02/05/2020 to main fax).     Speaker asking for PCP to complete and fax back to [phone]. Okay to initiate orders tomorrow or tonight.    Discharged

## 2024-04-04 ENCOUNTER — PRENATAL OFFICE VISIT (OUTPATIENT)
Dept: FAMILY MEDICINE | Facility: CLINIC | Age: 39
End: 2024-04-04
Payer: COMMERCIAL

## 2024-04-04 VITALS
WEIGHT: 178.56 LBS | HEIGHT: 68 IN | OXYGEN SATURATION: 100 % | BODY MASS INDEX: 27.06 KG/M2 | TEMPERATURE: 98 F | DIASTOLIC BLOOD PRESSURE: 70 MMHG | SYSTOLIC BLOOD PRESSURE: 100 MMHG | HEART RATE: 82 BPM | RESPIRATION RATE: 16 BRPM

## 2024-04-04 DIAGNOSIS — O21.0 HYPEREMESIS GRAVIDARUM: ICD-10-CM

## 2024-04-04 DIAGNOSIS — O09.529 ANTEPARTUM MULTIGRAVIDA OF ADVANCED MATERNAL AGE: Primary | ICD-10-CM

## 2024-04-04 DIAGNOSIS — D64.9 ANEMIA, UNSPECIFIED TYPE: ICD-10-CM

## 2024-04-04 PROCEDURE — 99207 PR PRENATAL VISIT: CPT | Performed by: FAMILY MEDICINE

## 2024-04-04 RX ORDER — METOCLOPRAMIDE 5 MG/1
5 TABLET ORAL 3 TIMES DAILY PRN
Qty: 60 TABLET | Refills: 3 | Status: SHIPPED | OUTPATIENT
Start: 2024-04-04 | End: 2024-08-02

## 2024-04-04 RX ORDER — FERROUS SULFATE 325(65) MG
325 TABLET ORAL
Qty: 90 TABLET | Refills: 2 | Status: SHIPPED | OUTPATIENT
Start: 2024-04-04 | End: 2024-07-10

## 2024-04-04 RX ORDER — PYRIDOXINE HCL (VITAMIN B6) 25 MG
25 TABLET ORAL 3 TIMES DAILY
Qty: 90 TABLET | Refills: 1 | Status: ON HOLD | OUTPATIENT
Start: 2024-04-04 | End: 2024-10-06

## 2024-04-04 NOTE — PROGRESS NOTES
"SUBJECTIVE:   at 15w5d by 12 week US. Estimated Date of Delivery: Oct 8, 2024.  She is having significant nausea and vomiting- she did have this with previous pregnancies but this pregnancy seems to be worse. She did have ED visit 3/11/24 requiring IV fluids. She was prescribed zofran- that has not helped. Has not tried anything else yet. She does feel flutters of movement.    Hx of uterine atony- pregnancy in - QBL was ~600 mL per notes    Hx of iron deficiency anemia- required IV iron in previous pregnancy      ROS  Negative except as noted above in HPI.  OBJECTIVE:  Blood pressure 100/70, pulse 82, temperature 98  F (36.7  C), temperature source Oral, resp. rate 16, height 1.727 m (5' 8\"), weight 81 kg (178 lb 9 oz), last menstrual period 2024, SpO2 100%, not currently breastfeeding.  Wt Readings from Last 3 Encounters:   24 81 kg (178 lb 9 oz)   23 77.1 kg (170 lb)   23 78 kg (172 lb)     Gen: comfortable, no acute distress.  See OB Vitals flowsheet.  ASSESSMENT/PLAN:  Cynthia was seen today for initial ob and emesis during pregnancy.    Diagnoses and all orders for this visit:    Antepartum multigravida of advanced maternal age  -      OB > 14 Weeks; Future    Anemia, unspecified type: Has previously required IV venofer in pregnancy. Due to hyperemesis, focus on iron rich foods- she is not tolerating PNV at this time, but I did prescribe ferrous sulfate for when her symptoms improve.  -     ferrous sulfate (FEROSUL) 325 (65 Fe) MG tablet; Take 1 tablet (325 mg) by mouth daily (with breakfast)    Hyperemesis gravidarum: significant symptoms- weight loss 3 lb. No clinical signs of dehydration. She was prescribed ondansetron but has not tried anything else. Will trial B6, unisom (as she is also having some insomnia) and reglan. I reviewed signs of dehydration and when to go to ED- and also advised her to call if symptoms are no improving over the weekend.  -     metoclopramide " (REGLAN) 5 MG tablet; Take 1 tablet (5 mg) by mouth 3 times daily as needed (nausea)  -     pyridOXINE (VITAMIN B6) 25 MG tablet; Take 1 tablet (25 mg) by mouth 3 times daily  -     doxylamine (UNISOM) 25 MG TABS tablet; Take 1 tablet (25 mg) by mouth nightly as needed    History of hemorrhage: Noted on problem list- reviewed with patient and chart- she denies any history of hemorrhage. Per records, in 2020 she had ~600 mL QBL after delivery requiring IV pitocin and rectal misprostol.     -IUP at 15w5d:   Prenatal labs reviewed   Reviewed NIPS- declined  Ordered ultrasound- advised to schedule after 20 weeks   Patient will be following with Dr. Cadena for rest of pregnancy- sent to  to schedule her appointments  RTC in 4 weeks or sooner with problems.        Sabi Davis MD

## 2024-05-07 ENCOUNTER — PRENATAL OFFICE VISIT (OUTPATIENT)
Dept: FAMILY MEDICINE | Facility: CLINIC | Age: 39
End: 2024-05-07
Payer: COMMERCIAL

## 2024-05-07 VITALS
RESPIRATION RATE: 16 BRPM | WEIGHT: 186 LBS | HEART RATE: 114 BPM | DIASTOLIC BLOOD PRESSURE: 67 MMHG | OXYGEN SATURATION: 96 % | SYSTOLIC BLOOD PRESSURE: 102 MMHG | BODY MASS INDEX: 28.19 KG/M2 | HEIGHT: 68 IN | TEMPERATURE: 98 F

## 2024-05-07 DIAGNOSIS — O99.012 ANEMIA DURING PREGNANCY IN SECOND TRIMESTER: ICD-10-CM

## 2024-05-07 DIAGNOSIS — O21.9: ICD-10-CM

## 2024-05-07 DIAGNOSIS — O09.529 ANTEPARTUM MULTIGRAVIDA OF ADVANCED MATERNAL AGE: Primary | ICD-10-CM

## 2024-05-07 PROBLEM — O99.019 ANEMIA IN PREGNANCY: Status: ACTIVE | Noted: 2024-05-07

## 2024-05-07 PROCEDURE — 99207 PR PRENATAL VISIT: CPT | Performed by: FAMILY MEDICINE

## 2024-05-07 RX ORDER — FAMOTIDINE 20 MG/1
20 TABLET, FILM COATED ORAL 2 TIMES DAILY
Qty: 20 TABLET | Refills: 1 | Status: SHIPPED | OUTPATIENT
Start: 2024-05-07 | End: 2024-08-02

## 2024-05-07 NOTE — PATIENT INSTRUCTIONS
"HEALTHY PREGNANCY CARE: 18-22 WEEKS PREGNANT    Your baby is continuing to develop quickly. At this stage, babies can now suck their thumbs,  firmly with their hands, and are beginning to hear.    Sometime between 18 and 22 weeks, you will start to feel your baby move. At first, these small fetal movements feel like fluttering or \"butterflies.\" Some women say that they feel like gas bubbles. As the baby grows, these movements will become stronger and be able to be felt through your abdomen.     Nutrition: During this time, you may find that your nausea and fatigue are gone. Overall, you may feel better and have more energy than you did in your first trimester. Be sure you are getting enough calcium and iron in your diet. Your prenatal vitamins cannot supply all of the nutrients you need, so continue to eat 3-4 servings of dairy foods and 2-3 servings of meat/fish/poultry/nuts every day. Foods high in iron include: red meats, eggs, dark green vegetables, dark yellow vegetables, nuts, kidney beans and chickpeas. Some cereals are fortified with iron, so look at the food labels for 100% of the daily requirement for iron.     [place] for childbirth and parenting classes, including an infant CPR class. Breastfeeding classes are recommended too.  [facility] has classes online and in [facility], and [place]:  [email or web address]  [phone].    Plan for the gestational diabetes screening between weeks 24-28. You can eat normally before that visit; we would suggest making sure you have protein foods, but not a lot of carbohydrates or sugary foods.    Your blood type was determined at your first OB appointment. If you are Rh negative, you should discuss the need for a Rhogam shot with your midwife or physician.     If you had a  birth in the past, discuss a trial of labor with your midwife or physician. He or she may ask that you obtain your operative report from that  if " you are wanting to have a vaginal birth after  () this time.     Think about the support you have, and what help you can plan on from family and friends, after your baby is born. Many mothers and babies are ready to go home from the hospital within a few days. Your clinic staff is available to assist you and the [facility] staff is available to you after hours. Start preparing your other children for changes they'll experience with the new baby. Explore day care options.    You may find that you have new discomforts now, such as sleep problems or leg cramps.       Watch for the warning signs of premature labor:   Dull, low backache  Contractions of the uterus, menstrual-like cramps  Abdominal cramping with or without diarrhea  More pelvic pressure  Increase or change in vaginal discharge.     Remember that labor doesn't have to hurt. Never hesitate to call your midwife or physician or their staff for any one of these warning signs - or if something just doesn't feel right.

## 2024-05-07 NOTE — PROGRESS NOTES
"Assessment/Plan:   38 year old  at 20w3d    Antepartum multigravida of advanced maternal age  Given AMA, would have her do ultrasound with [facility] instead of just radiology.  Reviewed dating; using 12w6d ultrasound, does make her 20w3d.  - [facility] Referral - Pregnancy    Excessive vomiting affecting pregnancy  Has gained weight now after having lost.  I think her main sx now are due to gastritis or reflux; trial pepcid.  - famotidine (PEPCID) 20 MG tablet  Dispense: 20 tablet; Refill: 1    Anemia during pregnancy in second trimester  Mild anemia 3/11/24 with hgb 10.5. Since rx'd iron. Advised this could definitely be contributing to her fatigue. Will plan to recheck CBC, plus add ferritin during 1 hr glucose.       Follow-up in 1 month.  She will be 24 weeks then, but will plan to wait until 28 w visit to do 1 hr glucose.  Subjective:   Cynthia Abdul is a 38 year old  at 20w3d who is seen for routine prenatal care.     Former Dr. Castillo pt (Dr Castillo left clinic).  Missed originally scheduled IOB with me, then saw Dr Davis for IOB. Had met me a few times in past and requested me for delivery doctor.    Has had sig nausea and vomiting. Now not vomiting as much. Taking unisom and B6.  Has been rx'd zofran and reglan, but it sounds like never took those.  Have epigastric discomfort that she doesn't feel like is acid.  Worse than that, she is very fatigued. Feels like can't stand very long.    Doesn't think her due date is right.    Scheduled for anatomy ultrasound next week.  Objective   Vitals: /67   Pulse 114   Temp 98  F (36.7  C) (Oral)   Resp 16   Ht 1.727 m (5' 8\")   Wt 84.4 kg (186 lb)   LMP 2024 (Approximate)   SpO2 96%   BMI 28.28 kg/m     Total weight gain:  1.814 kg (4 lb)   Exam: Per flowsheet     "

## 2024-05-08 ENCOUNTER — TRANSCRIBE ORDERS (OUTPATIENT)
Dept: MATERNAL FETAL MEDICINE | Facility: HOSPITAL | Age: 39
End: 2024-05-08
Payer: COMMERCIAL

## 2024-05-08 DIAGNOSIS — O26.90 PREGNANCY RELATED CONDITION: Primary | ICD-10-CM

## 2024-06-04 ENCOUNTER — PRE VISIT (OUTPATIENT)
Dept: MATERNAL FETAL MEDICINE | Facility: HOSPITAL | Age: 39
End: 2024-06-04
Payer: COMMERCIAL

## 2024-06-06 ENCOUNTER — OFFICE VISIT (OUTPATIENT)
Dept: MATERNAL FETAL MEDICINE | Facility: HOSPITAL | Age: 39
End: 2024-06-06
Attending: FAMILY MEDICINE
Payer: COMMERCIAL

## 2024-06-06 ENCOUNTER — ANCILLARY PROCEDURE (OUTPATIENT)
Dept: ULTRASOUND IMAGING | Facility: HOSPITAL | Age: 39
End: 2024-06-06
Attending: FAMILY MEDICINE
Payer: COMMERCIAL

## 2024-06-06 DIAGNOSIS — O26.90 PREGNANCY RELATED CONDITION: ICD-10-CM

## 2024-06-06 DIAGNOSIS — O09.522 ADVANCED MATERNAL AGE IN MULTIGRAVIDA, SECOND TRIMESTER: Primary | ICD-10-CM

## 2024-06-06 PROCEDURE — 96040 HC GENETIC COUNSELING, EACH 30 MINUTES: CPT | Performed by: GENETIC COUNSELOR, MS

## 2024-06-06 PROCEDURE — 76817 TRANSVAGINAL US OBSTETRIC: CPT

## 2024-06-06 PROCEDURE — 76817 TRANSVAGINAL US OBSTETRIC: CPT | Mod: 26 | Performed by: OBSTETRICS & GYNECOLOGY

## 2024-06-06 PROCEDURE — 99207 PR NO CHARGE LOS: CPT | Performed by: OBSTETRICS & GYNECOLOGY

## 2024-06-06 PROCEDURE — 76811 OB US DETAILED SNGL FETUS: CPT | Mod: 26 | Performed by: OBSTETRICS & GYNECOLOGY

## 2024-06-06 NOTE — PROGRESS NOTES
"[facility]  Genetic Counseling Consult    Patient:  Cynthai Abdul  Preferred Name: Cynthia YOB: 1985   Date of Service:  24   MRN: [id number]    Cynthia was seen at the [facility] for genetic consultation. The indication for genetic counseling is advanced maternal age. The patient was unaccompanied to this visit.          IMPRESSION/ PLAN   During today's [facility] visit, Cynthia had a genetic counseling session only. Screening and diagnostic testing was discussed and declined.  Cynthia had a level II comprehensive anatomy ultrasound today. Please see the ultrasound report for further details.    PREGNANCY HISTORY   /Parity:       Cynthia's pregnancy history is significant for:   7 full term, SVDs    CURRENT PREGNANCY   Current Age: 38 year old   Age at Delivery: 39 year old  PAULETTE: 2024, by Ultrasound                                   Gestational Age: 24w5d  This pregnancy is a single gestation.          FAMILY HISTORY   A three-generation pedigree was obtained today and is scanned under the \"Media\" tab in Epic. The family history was reported by Cynthia.    The reported family history is unremarkable for multiple miscarriages, stillbirths, birth defects, intellectual disabilities, known genetic conditions, and consanguinity.       RISK ASSESSMENT FOR CHROMOSOME CONDITIONS   We explained that the risk for fetal chromosome abnormalities increases with maternal age. We discussed specific features of common chromosome abnormalities, including trisomy 21 (Down syndrome), trisomy 13, trisomy 18, and sex chromosome trisomies.    At age 39 at delivery, the midtrimester risk to have a baby with Down syndrome is 1 in 98.   At age 39 at delivery, the midtrimester risk to have a baby with any chromosome abnormality is 1 in 51.     Cynthia has not had genetic screening in this pregnancy and declines options discussed " today.     GENETIC TESTING OPTIONS   Genetic testing during a pregnancy includes screening and diagnostic procedures.      Screening tests are non-invasive which means no risk to the pregnancy and includes ultrasounds and blood work. The benefits and limitations of screening were reviewed. Screening tests provide a risk assessment (chance) specific to the pregnancy for certain fetal chromosome abnormalities but cannot definitively diagnose or exclude a fetal chromosome abnormality. Follow-up genetic counseling and consideration of diagnostic testing is recommended with any abnormal screening result. Diagnostic testing during a pregnancy is more certain and can test for more conditions. However, the tests do have a risk of miscarriage that requires careful consideration. These tests can detect fetal chromosome abnormalities with greater than 99% certainty. Results can be compromised by maternal cell contamination or mosaicism and are limited by the resolution of current genetic testing technology.     There is no screening or diagnostic test that detects all forms of birth defects or intellectual disability.     We discussed the following screening options:   Non-invasive prenatal testing (NIPT)  Also called cell-free DNA screening because it detects chromosomes from the placenta in the pregnant person's blood  Can be done any time after 10 weeks gestation  Standard recommendation for NIPT screens for trisomy 21, trisomy 18, trisomy 13, with the option of adding sex chromosome aneuploidies, without or without predicted sex  Cannot screen for open neural tube defects, maternal serum AFP after 15 weeks is recommended    We discussed the following ultrasound options:  Comprehensive level II ultrasound (Fetal Anatomy Ultrasound)  Ultrasound done between 18-20 weeks gestation  Screens for major birth defects and markers for aneuploidy (like trisomy 21 and trisomy 18)  Includes looking at the fetus/baby's growth, heart,  organs (stomach, kidneys), placenta, and amniotic fluid    We discussed the following diagnostic options:   Amniocentesis  Invasive diagnostic procedure done after 15 weeks gestation  The procedure collects a small sample of amniotic fluid for the purpose of chromosomal testing and/or other genetic testing  Diagnostic result; more than 99% sensitivity for fetal chromosome abnormalities  Testing for AFP in the amniotic fluid can test for open neural tube defects    It was a pleasure to be involved with Jourdan s care. Face-to-face time of the meeting was  20  minutes.    Izabel Armstrong GC, MS, [facility]  Board Certified and [place] Licensed Genetic Counselor  [facility]  Maternal Fetal Medicine  Office: [phone]  [facility]: [phone]   Fax: [phone]  [facility]

## 2024-06-06 NOTE — PROGRESS NOTES
Please see the imaging tab for details of the ultrasound performed today.    Alea Montelongo MD  Specialist in Maternal-Fetal Medicine

## 2024-07-09 ENCOUNTER — PRENATAL OFFICE VISIT (OUTPATIENT)
Dept: FAMILY MEDICINE | Facility: CLINIC | Age: 39
End: 2024-07-09
Payer: COMMERCIAL

## 2024-07-09 VITALS
DIASTOLIC BLOOD PRESSURE: 66 MMHG | HEIGHT: 68 IN | BODY MASS INDEX: 29.26 KG/M2 | RESPIRATION RATE: 18 BRPM | OXYGEN SATURATION: 99 % | WEIGHT: 193.04 LBS | HEART RATE: 91 BPM | SYSTOLIC BLOOD PRESSURE: 100 MMHG | TEMPERATURE: 97.5 F

## 2024-07-09 DIAGNOSIS — O99.013 ANEMIA DURING PREGNANCY IN THIRD TRIMESTER: ICD-10-CM

## 2024-07-09 DIAGNOSIS — O09.529 ANTEPARTUM MULTIGRAVIDA OF ADVANCED MATERNAL AGE: Primary | ICD-10-CM

## 2024-07-09 LAB
ERYTHROCYTE [DISTWIDTH] IN BLOOD BY AUTOMATED COUNT: 16.3 % (ref 10–15)
GLUCOSE 1H P 50 G GLC PO SERPL-MCNC: 105 MG/DL (ref 70–129)
HCT VFR BLD AUTO: 33.1 % (ref 35–47)
HGB BLD-MCNC: 11.2 G/DL (ref 11.7–15.7)
MCH RBC QN AUTO: 28.9 PG (ref 26.5–33)
MCHC RBC AUTO-ENTMCNC: 33.8 G/DL (ref 31.5–36.5)
MCV RBC AUTO: 85 FL (ref 78–100)
PLATELET # BLD AUTO: 173 10E3/UL (ref 150–450)
RBC # BLD AUTO: 3.88 10E6/UL (ref 3.8–5.2)
WBC # BLD AUTO: 6.8 10E3/UL (ref 4–11)

## 2024-07-09 PROCEDURE — 90715 TDAP VACCINE 7 YRS/> IM: CPT | Performed by: FAMILY MEDICINE

## 2024-07-09 PROCEDURE — 99207 PR PRENATAL VISIT: CPT | Performed by: FAMILY MEDICINE

## 2024-07-09 PROCEDURE — 82728 ASSAY OF FERRITIN: CPT | Performed by: FAMILY MEDICINE

## 2024-07-09 PROCEDURE — 82950 GLUCOSE TEST: CPT | Performed by: FAMILY MEDICINE

## 2024-07-09 PROCEDURE — 86780 TREPONEMA PALLIDUM: CPT | Performed by: FAMILY MEDICINE

## 2024-07-09 PROCEDURE — 36415 COLL VENOUS BLD VENIPUNCTURE: CPT | Performed by: FAMILY MEDICINE

## 2024-07-09 PROCEDURE — 90471 IMMUNIZATION ADMIN: CPT | Performed by: FAMILY MEDICINE

## 2024-07-09 PROCEDURE — 85027 COMPLETE CBC AUTOMATED: CPT | Performed by: FAMILY MEDICINE

## 2024-07-09 NOTE — PROGRESS NOTES
"Assessment/Plan:   38 year old  at 29w3d    Antepartum multigravida of advanced maternal age  Doing well.  Discussed breastfeeding (plans breast),  Labor.  Labs today:  Hgb, RPR, 1hr glucose  Tdap given   - Glucose Challenge Test (GCT) 1 Hour  - Treponema Abs w Reflex to RPR and Titer  - TDAP VACCINE (Adacel, Boostrix)  [[id number]]    Anemia during pregnancy in third trimester  - CBC with platelets  - Ferritin     Will follow-up in about 3-1/2 weeks.  We did initially had her scheduled for couple of weeks but she does not want to come back that soon this early in pregnancy.  Will alert us with any problems.    Subjective:   Cynthia Abdul is a 38 year old  at 29w3d who is seen for routine prenatal care.   Doing well.  Endorses normal fetal movement.  Is uncomfortable with a pushing in her stomach, particularly when seated.  Otherwise is doing well.  Objective   Vitals: /66   Pulse 91   Temp 97.5  F (36.4  C) (Temporal)   Resp 18   Ht 1.727 m (5' 8\")   Wt 87.6 kg (193 lb 0.6 oz)   LMP 2024 (Approximate)   SpO2 99%   BMI 29.35 kg/m     Total weight gain:  5.008 kg (11 lb 0.6 oz)   Exam: Per flowsheet       Prior to immunization administration, verified patients identity using patient s name and date of birth. Please see Immunization Activity for additional information.     Screening Questionnaire for Adult Immunization    Are you sick today?   No   Do you have allergies to medications, food, a vaccine component or latex?   No   Have you ever had a serious reaction after receiving a vaccination?   No   Do you have a long-term health problem with heart, lung, kidney, or metabolic disease (e.g., diabetes), asthma, a blood disorder, no spleen, complement component deficiency, a cochlear implant, or a spinal fluid leak?  Are you on long-term aspirin therapy?   No   Do you have cancer, leukemia, HIV/AIDS, or any other immune system problem?   No   Do you have a parent, brother, or " sister with an immune system problem?   No   In the past 3 months, have you taken medications that affect  your immune system, such as prednisone, other steroids, or anticancer drugs; drugs for the treatment of rheumatoid arthritis, Crohn s disease, or psoriasis; or have you had radiation treatments?   No   Have you had a seizure, or a brain or other nervous system problem?   No   During the past year, have you received a transfusion of blood or blood    products, or been given immune (gamma) globulin or antiviral drug?   No   For women: Are you pregnant or is there a chance you could become       pregnant during the next month?   Yes   Have you received any vaccinations in the past 4 weeks?   No     Immunization questionnaire was positive for at least one answer.  Okay to give vaccine per provider.      Patient instructed to remain in clinic for 15 minutes afterwards, and to report any adverse reactions.     Screening performed by Salas Rubio MA on 7/9/2024 at 5:24 PM.

## 2024-07-09 NOTE — PATIENT INSTRUCTIONS
Learning About Screening for Gestational Diabetes  What is gestational diabetes screening?     Screening for gestational diabetes is a way to look for high blood sugar during pregnancy. You drink some very sweet liquid. Then you have a blood test to see how your body uses sugar (glucose).  How is gestational diabetes screening done?  Screening for gestational diabetes may be done in a couple of ways.  Two-part screening.  Part one (glucose challenge test): A blood sample is taken after you drink a liquid that contains sugar (glucose). You don't need to stop eating or drinking before this test. If the test shows that you don't have a lot of sugar in your blood, you don't have gestational diabetes.  Part two (oral glucose tolerance test, or OGTT): If the first test shows a lot of sugar in your blood, then you may have an OGTT. You can't eat or drink for at least 8 hours before this test. A blood sample is taken, then you drink a sweet liquid. You have more blood tests after 1 to 3 hours. If the OGTT shows that you have a lot of sugar in your blood, you may have gestational diabetes.  One-part screening.  Sometimes doctors use the OGTT on its own. If the test shows that you don't have a lot of sugar in your blood, you don't have gestational diabetes. If you do have a lot of sugar in your blood, you may have the condition.  What are the risks of screening?  Your blood glucose level may drop very low toward the end of the test. If this happens, you may feel weak, hungry, and restless. Tell your doctor if you have these symptoms. The test usually will be stopped.  You may vomit after drinking the sweet liquid. If this happens, you may need to take the test at a later time.  Your doctor may do more glucose tests at other times during your pregnancy.  Follow-up care is a key part of your treatment and safety. Be sure to make and go to all appointments, and call your doctor if you are having problems. It's also a good idea  "to know your test results and keep a list of the medicines you take.  Where can you learn more?  Go to https://www.[employer].net/patiented  Enter A472 in the search box to learn more about \"Learning About Screening for Gestational Diabetes.\"  Current as of: 2023               Content Version: 14.0    5965-0193 [employer].   Care instructions adapted under license by your healthcare professional. If you have questions about a medical condition or this instruction, always ask your healthcare professional. [employer] disclaims any warranty or liability for your use of this information.      Weeks 26 to 30 of Your Pregnancy: Care Instructions  You're starting your last trimester. You'll probably feel your baby moving around more. Your back may ache as your body gets used to your baby's size and length. Take care of yourself, and pay attention to what your body needs.    Talk to your doctor about getting the Tdap shot. It will help protect your  against whooping cough (pertussis). Also ask your doctor about flu and COVID-19 shots if you haven't had them yet. If your blood type is Rh negative, you may be given a shot of Rh immune globulin (such as RhoGAM). It can help prevent problems for your baby.    You may have [place] contractions. They are single or several strong contractions without a pattern. These are practice contractions but not the start of labor.  Be kind to yourself.     Take breaks when you're tired.  Change positions often. Don't sit for too long or stand for too long.  At work, rest during breaks if you can. If you don't get breaks, talk to your doctor about writing a letter to your employer to request them.  Avoid fumes, chemicals, and tobacco smoke.  Be sexual if you want to.     You may be interested in sex, or you may not. Everyone is different.  Sex is okay unless your doctor tells you not to.  Your belly can make it hard to find good positions " "for sex. [place] and explore.  Watch for signs of  labor.    These signs include:   Menstrual-like cramps. Or you may have pain or pressure in your pelvis that happens in a pattern.  About 6 or more contractions in an hour (even after rest and a glass of water).  A low, dull backache that doesn't go away when you change positions.  An increase or change in vaginal discharge.  Light vaginal bleeding or spotting.  Your water breaking.  Know what to do if you think you are having contractions.     Drink 1 or 2 glasses of water.  Lie down on your left side for at least an hour.  While on your side, feel the top of your belly to see if it's tight.  Write down your contractions for an hour. Time how long it is from the start of one contraction to the start of the next.  Call your doctor if you have regular contractions.  Follow-up care is a key part of your treatment and safety. Be sure to make and go to all appointments, and call your doctor if you are having problems. It's also a good idea to know your test results and keep a list of the medicines you take.  Where can you learn more?  Go to https://www.[employer].net/patiented  Enter S999 in the search box to learn more about \"Weeks 26 to 30 of Your Pregnancy: Care Instructions.\"  Current as of: July 10, 2023               Content Version: 14.0    3116-4792 [employer].   Care instructions adapted under license by your healthcare professional. If you have questions about a medical condition or this instruction, always ask your healthcare professional. [employer] disclaims any warranty or liability for your use of this information.      "

## 2024-07-10 ENCOUNTER — TELEPHONE (OUTPATIENT)
Dept: FAMILY MEDICINE | Facility: CLINIC | Age: 39
End: 2024-07-10
Payer: COMMERCIAL

## 2024-07-10 DIAGNOSIS — D64.9 ANEMIA, UNSPECIFIED TYPE: ICD-10-CM

## 2024-07-10 DIAGNOSIS — Z34.80 PRENATAL CARE, SUBSEQUENT PREGNANCY, UNSPECIFIED TRIMESTER: ICD-10-CM

## 2024-07-10 DIAGNOSIS — O09.519 SUPERVISION OF HIGH-RISK PREGNANCY OF ELDERLY PRIMIGRAVIDA: ICD-10-CM

## 2024-07-10 LAB
FERRITIN SERPL-MCNC: 213 NG/ML (ref 6–175)
T PALLIDUM AB SER QL: NONREACTIVE

## 2024-07-10 RX ORDER — FERROUS SULFATE 325(65) MG
325 TABLET ORAL
Qty: 90 TABLET | Refills: 2 | Status: SHIPPED | OUTPATIENT
Start: 2024-07-10

## 2024-07-10 RX ORDER — PRENATAL VIT/IRON FUM/FOLIC AC 27MG-0.8MG
1 TABLET ORAL DAILY
Qty: 90 TABLET | Refills: 3 | Status: SHIPPED | OUTPATIENT
Start: 2024-07-10

## 2024-07-10 NOTE — TELEPHONE ENCOUNTER
Please let pt know regarding labs obtained yesterday:   Her 1 hour blood sugar was normal.  Her hemoglobin/iron level is still a little low, but has improved (from 10.5 to 11.2)!  Keep taking prenatal vitamin.

## 2024-07-10 NOTE — TELEPHONE ENCOUNTER
Totally normal hemoglobin is 12 or higher, but 11 is pretty common for pregnancy.    I've sent over a good prescription for prenatal vitamin to her pharmacy.

## 2024-07-10 NOTE — TELEPHONE ENCOUNTER
Relayed results to patient. Patient understood but has a few questions regarding iron levels and prenatal vitamins.     1.What is a good number for iron levels to be at?   2. She would like the best and the highest mg prenatal vitamin sent to her pharmacy as she has ran out.

## 2024-08-02 ENCOUNTER — PRENATAL OFFICE VISIT (OUTPATIENT)
Dept: FAMILY MEDICINE | Facility: CLINIC | Age: 39
End: 2024-08-02
Payer: COMMERCIAL

## 2024-08-02 VITALS
SYSTOLIC BLOOD PRESSURE: 96 MMHG | HEART RATE: 103 BPM | WEIGHT: 193.5 LBS | OXYGEN SATURATION: 98 % | HEIGHT: 68 IN | TEMPERATURE: 97.8 F | BODY MASS INDEX: 29.33 KG/M2 | RESPIRATION RATE: 20 BRPM | DIASTOLIC BLOOD PRESSURE: 65 MMHG

## 2024-08-02 DIAGNOSIS — M54.9 BACK PAIN AFFECTING PREGNANCY IN THIRD TRIMESTER: ICD-10-CM

## 2024-08-02 DIAGNOSIS — R10.9 ABDOMINAL PAIN AFFECTING PREGNANCY: ICD-10-CM

## 2024-08-02 DIAGNOSIS — O26.899 ABDOMINAL PAIN AFFECTING PREGNANCY: ICD-10-CM

## 2024-08-02 DIAGNOSIS — O99.891 BACK PAIN AFFECTING PREGNANCY IN THIRD TRIMESTER: ICD-10-CM

## 2024-08-02 DIAGNOSIS — O26.843 UTERINE SIZE-DATE DISCREPANCY IN THIRD TRIMESTER: ICD-10-CM

## 2024-08-02 DIAGNOSIS — O09.529 ANTEPARTUM MULTIGRAVIDA OF ADVANCED MATERNAL AGE: Primary | ICD-10-CM

## 2024-08-02 PROCEDURE — 99207 PR PRENATAL VISIT: CPT | Performed by: FAMILY MEDICINE

## 2024-08-02 NOTE — PROGRESS NOTES
"Assessment/Plan:   38 year old  at 32w6d    Antepartum multigravida of advanced maternal age  Uterine size-date discrepancy in third trimester  Abdominal pain affecting pregnancy  Patient is particularly uncomfortable recently.  I did worry about the possibility of  contractions given her description, but she was not have any while she was here and her cervix was closed.  Additionally, fundal height is high for dates, although this may be due to body habitus.  Will get ultrasound to look for source of pain and check growth, although I do suspect most of her pain may be mostly musculoskeletal plus [place] contractions.  - US OB > 14 Weeks    Back pain affecting pregnancy in third trimester  Patient is having a lot of back and pelvic/pubic symphysis pain.  Will try maternity mate back brace for comfort.  Could consider physical therapy potentially as well.  - Neck/Shoulder/Back/Abdomen Bracing Supplies Order Maternity Back Brace; S39.012A: Strain of muscle, fascia, and tendon of lower back       Return in 3 weeks (on 2024).  Subjective:   Cynthia Abdul is a 38 year old  at 32w6d who is seen for routine prenatal care.     Feels like something is wrong-Pelvic pressure, back pressure, feels like baby pushing down  Fatigued  Not sleeping well.  Has not felt this way with previous pregnancies.  She notes that with other pregnancies she did lots of standing and even with all that standing, did not feel this poorly.      Objective   Vitals: BP 96/65   Pulse 103   Temp 97.8  F (36.6  C) (Oral)   Resp 20   Ht 1.727 m (5' 8\")   Wt 87.8 kg (193 lb 8 oz)   LMP 2024 (Approximate)   SpO2 98%   BMI 29.42 kg/m     Total weight gain:  5.216 kg (11 lb 8 oz)   Exam: Per flowsheet  Tender over pubic symphysis  : Cervix is very posterior but could be mobilized forward and was found to be closed.     DME (Durable Medical Equipment) Orders and Documentation  Orders Placed This Encounter "   Procedures    Neck/Shoulder/Back/Abdomen Bracing Supplies Order Maternity Back Brace; S39.012A: Strain of muscle, fascia, and tendon of lower back        The patient was assessed and it was determined the patient is in need of the following listed DME Supplies/Equipment. Please complete supporting documentation below to demonstrate medical necessity.

## 2024-08-05 ENCOUNTER — HOSPITAL ENCOUNTER (OUTPATIENT)
Dept: ULTRASOUND IMAGING | Facility: HOSPITAL | Age: 39
Discharge: HOME | End: 2024-08-05
Attending: FAMILY MEDICINE | Admitting: FAMILY MEDICINE
Payer: COMMERCIAL

## 2024-08-05 DIAGNOSIS — O26.899 ABDOMINAL PAIN AFFECTING PREGNANCY: ICD-10-CM

## 2024-08-05 DIAGNOSIS — R10.9 ABDOMINAL PAIN AFFECTING PREGNANCY: ICD-10-CM

## 2024-08-05 DIAGNOSIS — O26.843 UTERINE SIZE-DATE DISCREPANCY IN THIRD TRIMESTER: ICD-10-CM

## 2024-08-05 PROCEDURE — 76805 OB US >/= 14 WKS SNGL FETUS: CPT

## 2024-08-06 ENCOUNTER — TELEPHONE (OUTPATIENT)
Dept: FAMILY MEDICINE | Facility: CLINIC | Age: 39
End: 2024-08-06
Payer: COMMERCIAL

## 2024-08-06 NOTE — TELEPHONE ENCOUNTER
Taisha Cadena MD  [facility]  Please let pt know her OB ultrasound was NORMAL.  Baby is growing normally, no problems.

## 2024-08-08 ENCOUNTER — NURSE TRIAGE (OUTPATIENT)
Dept: FAMILY MEDICINE | Facility: CLINIC | Age: 39
End: 2024-08-08
Payer: COMMERCIAL

## 2024-08-08 DIAGNOSIS — O26.893 HEARTBURN DURING PREGNANCY IN THIRD TRIMESTER: Primary | ICD-10-CM

## 2024-08-08 DIAGNOSIS — R12 HEARTBURN DURING PREGNANCY IN THIRD TRIMESTER: Primary | ICD-10-CM

## 2024-08-08 RX ORDER — CALCIUM CARBONATE 500 MG/1
2 TABLET, CHEWABLE ORAL 2 TIMES DAILY
Qty: 100 TABLET | Refills: 3 | Status: ON HOLD | OUTPATIENT
Start: 2024-08-08 | End: 2024-10-06

## 2024-08-08 RX ORDER — FAMOTIDINE 40 MG/1
40 TABLET, FILM COATED ORAL DAILY
Qty: 90 TABLET | Refills: 3 | Status: ON HOLD | OUTPATIENT
Start: 2024-08-08 | End: 2024-10-06

## 2024-08-08 NOTE — TELEPHONE ENCOUNTER
Suspect pregnancy-related heartburn/reflux since this has been at the same severity for 3 months. Recommend TUMS and famotidine 40 mg per day. Will prescribe these. If the pain is getting worse within the next 24 hours, please go in to be seen at hospital.     Tiffany Robles MD

## 2024-08-08 NOTE — TELEPHONE ENCOUNTER
Will route encounter to Dr. Robles, covering for Dr. Cadena.    - OB patient  - 33W5D, PAULETTE 9/21/2024  - Next OB: 8/23      Damion Oliveira RN  [facility]

## 2024-08-08 NOTE — TELEPHONE ENCOUNTER
Dr. Cadena/Covering Clinician-Please review and advise.    Patient's call warm transferred to writer with patient reporting fatigue, burning sensation in chest and trouble sleeping.    Nurse Triage SBAR    Is this a 2nd Level Triage? NO    Situation: Chest pain, fatigue, trouble sleeping    Background: Per patient:  Burning sensation in chest the past 3 months  Constant  Does not worsen with eating  Nothing makes it better  Right now the burning sensation is severe and has been severe for 3 months  Feels like the chest pain is getting worse  No trouble breathing right now, only with laying down  No tachycardia nor palpitations  Did not discuss at 8/2/24 visit  Just stays in chest  Thinks this is gas related    Assessment: Worsening, severe chest pain should be evaluated    Protocol Recommended Disposition:   Go to ED Now    Recommendation: Emergency Room evaluation recommended and patient declined.     Routed to provider    Does the patient meet one of the following criteria for ADS visit consideration? 16+ years old, with an [facility] PCP     TIP  Providers, please consider if this condition is appropriate for management at one of our Acute and Diagnostic Services sites.     If patient is a good candidate, please use dotphrase <dot>triageresponse and select Refer to ADS to document.          Writer reviewed with patient given report of constant, severe chest pain/burning sensation, Emergency Room evaluation recommended.  Patient declined and requested clinician input.  Informed patient high priority message will be sent to Dr. Cadena/covering clinicians.  Patient verbalized understanding and in agreement with plan.        Thank you!  BULL VannN, RN-[facility]      Reason for Disposition   SEVERE chest pain    Additional Information   Negative: SEVERE difficulty breathing (e.g., struggling for each breath, speaks in single words)   Negative: Difficult to awaken or acting confused  (e.g., disoriented, slurred speech)   Negative: Shock suspected (e.g., cold/pale/clammy skin, too weak to stand, low BP, rapid pulse)   Negative: Passed out (i.e., lost consciousness, collapsed and was not responding)   Negative: Followed an injury to chest    Protocols used: Chest Pain-A-[place]

## 2024-08-08 NOTE — TELEPHONE ENCOUNTER
"Spoke to patient and relayed info.  Patient states she feels fatigued and has trouble sleeping. Feels \"burning\" sensation on her chest. Transferred call to Livia.   "

## 2024-08-23 ENCOUNTER — TELEPHONE (OUTPATIENT)
Dept: FAMILY MEDICINE | Facility: CLINIC | Age: 39
End: 2024-08-23

## 2024-08-23 ENCOUNTER — PRENATAL OFFICE VISIT (OUTPATIENT)
Dept: FAMILY MEDICINE | Facility: CLINIC | Age: 39
End: 2024-08-23
Payer: COMMERCIAL

## 2024-08-23 VITALS
RESPIRATION RATE: 20 BRPM | SYSTOLIC BLOOD PRESSURE: 97 MMHG | HEART RATE: 96 BPM | DIASTOLIC BLOOD PRESSURE: 66 MMHG | HEIGHT: 68 IN | BODY MASS INDEX: 29.4 KG/M2 | TEMPERATURE: 97.8 F | OXYGEN SATURATION: 98 % | WEIGHT: 194 LBS

## 2024-08-23 DIAGNOSIS — O09.519 SUPERVISION OF HIGH-RISK PREGNANCY OF ELDERLY PRIMIGRAVIDA: Primary | ICD-10-CM

## 2024-08-23 DIAGNOSIS — I83.813 VARICOSE VEINS OF BILATERAL LOWER EXTREMITIES WITH PAIN: ICD-10-CM

## 2024-08-23 PROCEDURE — 87653 STREP B DNA AMP PROBE: CPT | Performed by: FAMILY MEDICINE

## 2024-08-23 PROCEDURE — 99207 PR PRENATAL VISIT: CPT | Performed by: FAMILY MEDICINE

## 2024-08-23 NOTE — PROGRESS NOTES
"Assessment/Plan:   38 year old  at 35w6d    Supervision of high-risk pregnancy of elderly primigravida  Doing well.   Reviewed ultrasound after last visit was normal.  Discussed pain meds in labor - plans none  GBS obtained today.   Hgb 24 was 11.2, but could consider recheck.  Didn't want today.  Ok to take the vitamins that she brought.    Varicose veins of bilateral lower extremities with pain  Referring to [facility] per pt preference for treatment after delivery.  Tried compression stockings in the past with no relief.  - Other Specialty Referral       Return in 1 week (on 2024) for prenatal care. Consider hemoglobin and vaccine immunity labs that visit.  Subjective:   Cynthia Abdul is a 38 year old  at 35w6d who is seen for routine prenatal care.   Doing well. Feeling much better than last visit. Does feel tired and wonders about iron level.  Endorses normal fetal movement.   Requests referral to [facility] for painful varicose veins in bilateral legs,  treatment after delivery.  Has used compression stockings in past with no relief.  Wonders if okay to take some supplements (that she brought here):  magnesium, Vitamin C, Vitamin D+K  Objective   Vitals: BP 97/66   Pulse 96   Temp 97.8  F (36.6  C) (Oral)   Resp 20   Ht 1.727 m (5' 8\")   Wt 88 kg (194 lb)   LMP 2024 (Approximate)   SpO2 98%   BMI 29.50 kg/m     Total weight gain:  5.443 kg (12 lb)   Exam: Per flowsheet  Extensive varicose veins bilateral legs and ankles.   Cephalic presentation confirmed on bedside ultrasound  "

## 2024-08-23 NOTE — PATIENT INSTRUCTIONS
Information from Your Family Doctor  Managing Pain in Labor  Am Jas Physician. 2021 Mar 15;103(6):online.      How painful will my labor be?  There is no way to know what your labor will be like. Pain during childbirth is different for everyone. Some women need little or no pain relief. Others find that pain medicine gives them better control over their labor and delivery. Women who regularly take opioid medications may find it harder to control their pain during labor.    What are my choices for pain relief?  Pain can be managed with or without medicine. A trained personal labor assistant (also called a ) can help you manage your pain without using medicine by offering encouragement and support. Other ways to manage pain without medicine include soaking your body in water (at body temperature) or changing your position during the first part of labor. Standing or sitting up may feel better than lying down.    If you choose to use pain medicine, there are many options. Some pain medicines, like opioids, go directly into your veins through an IV. If you have an epidural or spinal, the pain medicine is injected into your back.    When do I need to decide what kind of pain relief I want?  Knowing your choices ahead of time may help your delivery go more smoothly.    Your doctors and nurses during labor and delivery can help you decide which to use. Keep your options open. Some women plan to use pain medicine, but then decide they do not need or want it. Others plan not to have any pain management but change their mind during labor. You should not feel pressured to do one or the other.    What are opioids?  Opioids are a type of pain medicine that can be given during labor. They are usually given through an IV. They are given only in small doses and only early in labor to minimize side effects.    What is the difference between a spinal and an epidural?  A spinal and an epidural are used to relieve pain or numb the  lower part of the body. For both, a needle is placed in the lower back.    A spinal is one shot of medicine, and the needle is taken out right away. The medicine makes you numb for a short time, and then wears off. It is usually used for short procedures when the doctor knows how long you will need pain relief, such as a planned  ([id number]) delivery (called a  for short).    An epidural is a catheter (very small tube that is soft and bendable) that is put through the needle into the area near the spine. The needle is then removed, leaving the tube. Medicine can be given through this tube continuously. Your doctor can give you more or less medicine as needed. An epidural is used when the doctor does not know how long pain relief is needed, such as during labor.    What if I need a ?  Some women have planned C-sections, and some are not planned. A spinal is usually used for a planned . If you have an unplanned , you may already have an epidural from labor, or you may get a new spinal. It is rare to need general anesthesia (go to sleep) for a .    Does an epidural or spinal hurt?  The area where the epidural or spinal is given will be numbed, so there is only a little pain. You will probably feel some pressure. It can be uncomfortable, but most women find that the pain relief it gives during delivery is worth it.    Will I feel contractions, and will I be able to push?  If you get a spinal or epidural, you should still be aware of the contractions and be able to push. There is a small risk that you won't be able to feel when you have contractions. You may have to push for longer if you have an epidural or spinal. An epidural or spinal does not make it more likely that you will need a  or that the doctor will need to use a vacuum or forceps tool to get your baby out.    What are the side effects of pain medicines?  Less than 1% of patients get  headaches after a spinal or epidural. The headache may last a few days, but it can be treated. Some women have low blood pressure for a short time after a spinal or epidural and might need extra medicine to bring the blood pressure back up. Opioids can make the mother or baby feel sleepy.    Will I have pain after my delivery?  It is common to have pain after delivery. Some women have more pain than others. After a vaginal delivery, many women still have some pain in their stomach, vagina, and breasts. After a , many women have pain near their incision scar. Your doctor will treat your specific type of pain and will try not to prescribe unnecessary opioid medicines because they can be addictive.    Where can I get more information?  Your doctor    American College of Obst[employer]necologists  [email or web address]://[email or web address]/[email or web address]-resources/faqs/[email or web address]-[email or web address]/[email or web address]-pain-relief-during-labor-and-delivery      This handout is provided to you by your family doctor and the American Academy of [employer]icians. Other health-related information is available from the AAFP online at http://familydoctor.org.       Referral Details       Referred By   Referred To    Taisha Cadena MD   [address] [place] [address]   Phone: [phone]   Fax: [phone]    Diagnoses: Varicose veins of bilateral lower extremities with pain   Order: Other Specialty Referral    [facility]   [address]   [address]   [facility] [address]   Phone: [phone]   Fax: [phone]       Comment: Please be aware that coverage of these services is subject to the terms and limitations of your health insurance plan.  Call member services at your health plan with any benefit or coverage questions.

## 2024-08-23 NOTE — TELEPHONE ENCOUNTER
Please fax referral to [facility] from today and ask them to call pt to schedule.  She is currently pregnant, but would like to see them soon after she delivers.

## 2024-08-24 LAB — GP B STREP DNA SPEC QL NAA+PROBE: POSITIVE

## 2024-09-04 ENCOUNTER — PRENATAL OFFICE VISIT (OUTPATIENT)
Dept: FAMILY MEDICINE | Facility: CLINIC | Age: 39
End: 2024-09-04
Payer: COMMERCIAL

## 2024-09-04 VITALS
HEART RATE: 97 BPM | WEIGHT: 192.08 LBS | BODY MASS INDEX: 29.11 KG/M2 | DIASTOLIC BLOOD PRESSURE: 69 MMHG | TEMPERATURE: 97.2 F | OXYGEN SATURATION: 99 % | RESPIRATION RATE: 20 BRPM | SYSTOLIC BLOOD PRESSURE: 103 MMHG | HEIGHT: 68 IN

## 2024-09-04 DIAGNOSIS — O99.820 GROUP B STREPTOCOCCUS CARRIER, +RV CULTURE, CURRENTLY PREGNANT: ICD-10-CM

## 2024-09-04 DIAGNOSIS — O09.529 ANTEPARTUM MULTIGRAVIDA OF ADVANCED MATERNAL AGE: Primary | ICD-10-CM

## 2024-09-04 PROCEDURE — 99207 PR PRENATAL VISIT: CPT | Performed by: FAMILY MEDICINE

## 2024-09-04 NOTE — PROGRESS NOTES
"Assessment/Plan:   38 year old  at 37w4d    Antepartum multigravida of advanced maternal age  Group B Streptococcus carrier, +RV culture, currently pregnant  Doing well except for family stress (mom with possible cancer)  Reviewed labs from last visit. GBS: positive  Discussed GBS implications and when to call/go in for labor.        Return in 1 week (on 2024) for prenatal care.  Subjective:   Cynthia Abdul is a 38 year old  at 37w4d who is seen for routine prenatal care.   Is very worried about her mother; who was in ER yesterday with postmenopausal bleeding and is worried it could be cancer.    Hasn't thought much about pregnancy, isn't ready at home for baby yet.  Endorses normal fetal movement.    Objective   Vitals: /69   Pulse 97   Temp 97.2  F (36.2  C) (Temporal)   Resp 20   Ht 1.727 m (5' 8\")   Wt 87.1 kg (192 lb 1.3 oz)   LMP 2024 (Approximate)   SpO2 99%   BMI 29.21 kg/m     Total weight gain:  4.572 kg (10 lb 1.3 oz)   Exam: Per flowsheet  Cephalic presentation confirmed with bedside ultrasound     "

## 2024-09-17 ENCOUNTER — PRENATAL OFFICE VISIT (OUTPATIENT)
Dept: FAMILY MEDICINE | Facility: CLINIC | Age: 39
End: 2024-09-17
Payer: COMMERCIAL

## 2024-09-17 VITALS
DIASTOLIC BLOOD PRESSURE: 58 MMHG | TEMPERATURE: 97.1 F | SYSTOLIC BLOOD PRESSURE: 102 MMHG | OXYGEN SATURATION: 97 % | WEIGHT: 197.12 LBS | RESPIRATION RATE: 18 BRPM | BODY MASS INDEX: 29.87 KG/M2 | HEART RATE: 101 BPM | HEIGHT: 68 IN

## 2024-09-17 DIAGNOSIS — O09.529 ANTEPARTUM MULTIGRAVIDA OF ADVANCED MATERNAL AGE: Primary | ICD-10-CM

## 2024-09-17 PROCEDURE — 99207 PR PRENATAL VISIT: CPT | Performed by: FAMILY MEDICINE

## 2024-09-18 NOTE — PROGRESS NOTES
"Assessment/Plan:   39 year old  at 39w3d    Antepartum multigravida of advanced maternal age  Doing well.  Reviewed skin-to-skin, delayed cord clamping, baby meds and vaccines.   Offered optional induction for age 35-39 weeks, but she declines.  Discussed on-call coverage for the next few days.     Follow-up in 1 week. Would check NST that visit if not delivered.  Subjective:   Cynthia Abdul is a 39 year old  at 39w3d who is seen for routine prenatal care.   Doing well.  Endorses normal fetal movement.    Objective   Vitals: /58   Pulse 101   Temp 97.1  F (36.2  C) (Temporal)   Resp 18   Ht 1.727 m (5' 8\")   Wt 89.4 kg (197 lb 1.9 oz)   LMP 2024 (Approximate)   SpO2 97%   BMI 29.97 kg/m     Total weight gain:  6.858 kg (15 lb 1.9 oz)   Exam: Per flowsheet   Cephalic presentation confirmed on bedside ultrasound.  "

## 2024-09-24 ENCOUNTER — PRENATAL OFFICE VISIT (OUTPATIENT)
Dept: FAMILY MEDICINE | Facility: CLINIC | Age: 39
End: 2024-09-24
Payer: COMMERCIAL

## 2024-09-24 VITALS
RESPIRATION RATE: 16 BRPM | BODY MASS INDEX: 29.42 KG/M2 | SYSTOLIC BLOOD PRESSURE: 115 MMHG | TEMPERATURE: 98.1 F | OXYGEN SATURATION: 99 % | WEIGHT: 194.12 LBS | HEART RATE: 109 BPM | HEIGHT: 68 IN | DIASTOLIC BLOOD PRESSURE: 65 MMHG

## 2024-09-24 DIAGNOSIS — O09.529 ANTEPARTUM MULTIGRAVIDA OF ADVANCED MATERNAL AGE: Primary | ICD-10-CM

## 2024-09-24 PROCEDURE — 99207 PR PRENATAL VISIT: CPT | Performed by: FAMILY MEDICINE

## 2024-09-24 NOTE — PROGRESS NOTES
"Assessment/Plan:   39 year old  at 40w3d    Antepartum multigravida of advanced maternal age  Doing well.  Attempted to check NST today due to postdates, but could not get FHT's recorded on NST machine.  Baby had lots of movement and doppler FHT's normal after 60 seconds, so did not yet pursue further.  Suggested setting up induction for 41 weeks in case she doesn't go into labor, but pt doesn't want anything arranged yet.  Prefers to come back in 1 week to further address.     Declines flu and COVID vaccines.    Return in 1 week (on 10/1/2024) for prenatal care.  Subjective:   Cynthia Abdul is a 39 year old  at 40w3d who is seen for routine prenatal care.   Doing well.  No changes.  Endorses normal fetal movement.  Denies headache, abdominal pain, nausea, contractions, lower extremity edema.  Objective   Vitals: /65 (BP Location: Right arm, Patient Position: Sitting, Cuff Size: Adult Regular)   Pulse 109   Temp 98.1  F (36.7  C) (Oral)   Resp 16   Ht 1.727 m (5' 8\")   Wt 88.1 kg (194 lb 1.9 oz)   LMP 2024 (Approximate)   SpO2 99%   BMI 29.52 kg/m     Total weight gain:  5.498 kg (12 lb 1.9 oz)   Exam: Per flowsheet  Cervix:  0.5/50/high-ballotable. Unable to sweep membranes.  Unable to palpate fetal head.  Fetal cephalic presentation confirmed on handheld ultrasound.  " Brief Operative Note    Patient: Usha Gerard 63 year old female    MRN: [id number]    Surgeon(s): Romero Auguste MD  Phone Number: [phone]                       Surgeons and Role:     * Romero Auguste MD - Primary    Assistant(s): Romana     Pre-Op Diagnosis: Chronic bilateral low back pain with bilateral sciatica [M54.42, M54.41, G89.29]     Post-Op Diagnosis: same     Procedure: Procedure(s):  Bilateral L3-4, L4-5 laminoforaminotomy    Anesthesia Type: General                                   Complications: None    Description: stenosis    Findings: same    Specimens Removed: No specimens collected     Estimated Blood Loss: 2ml    Assistant Tasks:  closing     Implants:   Implant Name Type Inv. Item Serial No.  Lot No. LRB No. Used Action   SPONGE ABS [id number] 12.5X8CM PORCINE GELTN STRL DISP SURGFM - [id number] Other Implant SPONGE ABS [id number] 12.5X8CM PORCINE GELTN STRL DISP SURGFM  [employer] [id number] N/A 1 Implanted         I was present for the key portions of the procedure and was immediately available for the non-key portions      Romero Auguste MD, PhD, [facility]  Department of Neurosurgery  [facility]    T:  [phone]   F:  [phone]   M:  [phone]

## 2024-10-01 ENCOUNTER — PRENATAL OFFICE VISIT (OUTPATIENT)
Dept: FAMILY MEDICINE | Facility: CLINIC | Age: 39
End: 2024-10-01
Payer: COMMERCIAL

## 2024-10-01 VITALS
SYSTOLIC BLOOD PRESSURE: 88 MMHG | RESPIRATION RATE: 16 BRPM | OXYGEN SATURATION: 100 % | HEART RATE: 103 BPM | TEMPERATURE: 98.5 F | HEIGHT: 68 IN | WEIGHT: 197.12 LBS | DIASTOLIC BLOOD PRESSURE: 65 MMHG | BODY MASS INDEX: 29.87 KG/M2

## 2024-10-01 DIAGNOSIS — O09.529 ANTEPARTUM MULTIGRAVIDA OF ADVANCED MATERNAL AGE: Primary | ICD-10-CM

## 2024-10-01 DIAGNOSIS — O48.0 POST-TERM PREGNANCY, 40-42 WEEKS OF GESTATION: ICD-10-CM

## 2024-10-01 PROCEDURE — 99207 PR PRENATAL VISIT: CPT | Performed by: FAMILY MEDICINE

## 2024-10-01 PROCEDURE — 59025 FETAL NON-STRESS TEST: CPT | Performed by: FAMILY MEDICINE

## 2024-10-01 NOTE — PATIENT INSTRUCTIONS
"Patient information: When your baby is overdue (The Basics)   Written by the doctors and editors at [facility]     How does my doctor or nurse figure out my due date? -- To figure out your due date, your doctor or nurse counts 40 weeks from the first day of your last monthly period.   If your periods are not regular, counting won't give a correct due date. In that case, your doctor or nurse will do an imaging test called an ultrasound to figure out your due date. An ultrasound can create pictures of your baby when it is inside your body. Your doctor can use these pictures to measure your baby's size and figure out your due date.    When is a baby overdue? -- A baby is overdue if he or she has not been born by 42 weeks (2 weeks after the due date). A pregnancy that lasts longer than 42 weeks is also called a \"postterm pregnancy\" or \"postdated pregnancy.\"     What can cause a baby to be overdue? -- Doctors don't usually know. But a woman has a higher chance of having her baby after the due date if:  ?It's her first pregnancy  ?She's already had a baby that was born after its due date  Why is it a problem for babies to be born after 42 weeks? -- Babies born after 42 weeks have a higher chance of having problems, such as:  ?Being too big (heavier than 10 pounds) - A big baby can get hurt if it cannot easily fit through the birth canal (figure 1). A big baby can also damage the mother's body when it comes out through the birth canal. Sometimes, the mother has to have a  (surgery to get the baby out).  ?Having a bowel movement in the amniotic fluid before birth - Babies who breathe in some of their amniotic fluid with bowel movement in it can have breathing problems after they are born.     Other problems that can happen are:  ?The placenta might not work as well as it did earlier in the pregnancy - The placenta is the organ that brings the baby nutrients and oxygen and carries away waste.  ?There can be too little " "amniotic fluid (water) surrounding the baby in the uterus - If there is not enough amniotic fluid, the umbilical cord can get squeezed. If the umbilical cord is squeezed, the baby might not get enough oxygen and nutrients from the placenta.    What happens if I am still pregnant after my due date? -- If you are still pregnant after your due date, your doctor or nurse will do tests to check your baby's health. Most doctors and nurses do these tests at 41 or 42 weeks, but some women have these tests earlier. The tests can check:  ?Your baby's heartbeat  ?Your baby's breathing and movements  ?The amount of amniotic fluid that surrounds your baby in the uterus  Depending on the test results and how far along your pregnancy is, your doctor might:  ?Let your pregnancy continue until your body is ready to give birth  ?Recommend that you have the baby soon    What if my doctor or nurse recommends that I have my baby soon? -- If your doctor or nurse recommends that you have your baby soon, he or she will discuss your choices.  If your body isn't ready to give birth, your doctor can give you medicines to help start your labor. When doctors use medicines to help start a woman's labor, they call it \"inducing labor.\" These medicines often work.    Your doctor can also do a  to deliver your baby. A woman might have a  if:  ?Medicines to induce labor don't work  ?Her baby is too big to safely fit through her birth canal     Will my baby be healthy if he or she is born after the due date? -- Most babies born after their due date are healthy. But babies born after their due date can look a little different at birth and have:  ?Long and thin arms or legs  ?Dry or peeling skin  ?Long hair and nails    "

## 2024-10-02 NOTE — PROGRESS NOTES
"Assessment/Plan:   39 year old  at 41w3d    Antepartum multigravida of advanced maternal age  Post-term pregnancy, 40-42 weeks of gestation  - Fetal No n-stress Test, Single Pregnancy (58459)  - US OB Biophys Single Gestation Measure  We have been using due date of 2029 for this patient based on 12 weeks 6-day transabdominal ultrasound that was initially written down as 17-day discrepancy from LMP.  Today she tells me that the LMP we had written down of  was definitely not right, but that her LMP was earlier, in the last week of December probably, and the  was likely when the bleeding was completely over.  If is not this is the case, and we say that her last menstrual period started on 2023, that would still get an 8-day discrepancy with a 12-week 6-day ultrasound which normally I would use ultrasound for dates and therefore she would be quite overdue today at 41 weeks 3 days.  However she strongly feels that she is not that overdue.  She does want not want any induction or intervention at this time.  However, she is agreeable to checking a BPP and if there was anything worrisome on that result, she would be open to further discussion.  Otherwise, she wants to wait until next week before considering induction.  We did discuss that there is small increased risk of stillbirth as time goes on with the pregnancy, particularly with her age over 35, but she feels strongly that she prefer to wait instead.    Return in 1 week (on 10/8/2024) for prenatal care.  Subjective:   Cynthia Abdul is a 39 year old  at 41w3d who is seen for routine prenatal care.   She is accompanied by her mother today.  Doing well.  Endorses normal fetal movement.  No problems, no significant changes.  Feels well.  Objective   Vitals: BP (!) 88/65 (BP Location: Right arm, Patient Position: Sitting, Cuff Size: Adult Regular)   Pulse 103   Temp 98.5  F (36.9  C) (Oral)   Resp 16   Ht 1.727 m (5' 8\")   Wt " 89.4 kg (197 lb 1.9 oz)   LMP 12/26/2023   SpO2 100%   BMI 29.97 kg/m     Total weight gain:  6.858 kg (15 lb 1.9 oz)   Exam: Per flowsheet     Fetal non-stress test (done in clinic today): (Of note, tracing was difficult due to maternal body habitus/fetal position)  Baseline fetal heart rate: 130  Variability: moderate  Accelerations present:yes  Reactive:  Yes   Decelerations present: no   [place]: no contractions detected  CONCLUSION:  Reactive NST, Category 1 fetal heart tracing

## 2024-10-03 ENCOUNTER — HOSPITAL ENCOUNTER (INPATIENT)
Facility: HOSPITAL | Age: 39
LOS: 3 days | Discharge: HOME HEALTH | End: 2024-10-06
Attending: FAMILY MEDICINE | Admitting: FAMILY MEDICINE
Payer: COMMERCIAL

## 2024-10-03 ENCOUNTER — HOSPITAL ENCOUNTER (OUTPATIENT)
Dept: ULTRASOUND IMAGING | Facility: HOSPITAL | Age: 39
Discharge: HOME | End: 2024-10-03
Attending: FAMILY MEDICINE | Admitting: FAMILY MEDICINE
Payer: COMMERCIAL

## 2024-10-03 DIAGNOSIS — F51.01 PRIMARY INSOMNIA: ICD-10-CM

## 2024-10-03 DIAGNOSIS — D62 ANEMIA DUE TO BLOOD LOSS, ACUTE: ICD-10-CM

## 2024-10-03 DIAGNOSIS — O48.0 POST-TERM PREGNANCY, 40-42 WEEKS OF GESTATION: ICD-10-CM

## 2024-10-03 PROBLEM — Z34.90 PREGNANT: Status: ACTIVE | Noted: 2024-10-03

## 2024-10-03 PROBLEM — O32.2XX0 TRANSVERSE LIE OF FETUS: Status: ACTIVE | Noted: 2024-10-03

## 2024-10-03 LAB
ABO/RH(D): NORMAL
ANTIBODY SCREEN: NEGATIVE
HGB BLD-MCNC: 12.4 G/DL (ref 11.7–15.7)
SPECIMEN EXPIRATION DATE: NORMAL

## 2024-10-03 PROCEDURE — 99222 1ST HOSP IP/OBS MODERATE 55: CPT | Mod: 25 | Performed by: FAMILY MEDICINE

## 2024-10-03 PROCEDURE — 250N000009 HC RX 250: Performed by: FAMILY MEDICINE

## 2024-10-03 PROCEDURE — 86901 BLOOD TYPING SEROLOGIC RH(D): CPT | Performed by: FAMILY MEDICINE

## 2024-10-03 PROCEDURE — 86900 BLOOD TYPING SEROLOGIC ABO: CPT | Performed by: FAMILY MEDICINE

## 2024-10-03 PROCEDURE — 250N000011 HC RX IP 250 OP 636: Performed by: FAMILY MEDICINE

## 2024-10-03 PROCEDURE — 85018 HEMOGLOBIN: CPT | Performed by: FAMILY MEDICINE

## 2024-10-03 PROCEDURE — 36415 COLL VENOUS BLD VENIPUNCTURE: CPT | Performed by: FAMILY MEDICINE

## 2024-10-03 PROCEDURE — 258N000003 HC RX IP 258 OP 636: Performed by: FAMILY MEDICINE

## 2024-10-03 PROCEDURE — 999N000248 HC STATISTIC IV INSERT WITH US BY RN

## 2024-10-03 PROCEDURE — 76819 FETAL BIOPHYS PROFIL W/O NST: CPT

## 2024-10-03 PROCEDURE — 85027 COMPLETE CBC AUTOMATED: CPT | Performed by: FAMILY MEDICINE

## 2024-10-03 PROCEDURE — 86780 TREPONEMA PALLIDUM: CPT | Performed by: FAMILY MEDICINE

## 2024-10-03 PROCEDURE — 120N000001 HC R&B MED SURG/OB

## 2024-10-03 RX ORDER — ONDANSETRON 2 MG/ML
4 INJECTION INTRAMUSCULAR; INTRAVENOUS EVERY 6 HOURS PRN
Status: DISCONTINUED | OUTPATIENT
Start: 2024-10-03 | End: 2024-10-04 | Stop reason: HOSPADM

## 2024-10-03 RX ORDER — MISOPROSTOL 200 UG/1
800 TABLET ORAL
Status: DISCONTINUED | OUTPATIENT
Start: 2024-10-03 | End: 2024-10-04 | Stop reason: HOSPADM

## 2024-10-03 RX ORDER — PENICILLIN G 3000000 [IU]/50ML
3 INJECTION, SOLUTION INTRAVENOUS EVERY 4 HOURS
Status: DISCONTINUED | OUTPATIENT
Start: 2024-10-03 | End: 2024-10-04

## 2024-10-03 RX ORDER — NALOXONE HYDROCHLORIDE 0.4 MG/ML
0.2 INJECTION, SOLUTION INTRAMUSCULAR; INTRAVENOUS; SUBCUTANEOUS
Status: DISCONTINUED | OUTPATIENT
Start: 2024-10-03 | End: 2024-10-04 | Stop reason: HOSPADM

## 2024-10-03 RX ORDER — MISOPROSTOL 200 UG/1
400 TABLET ORAL
Status: DISCONTINUED | OUTPATIENT
Start: 2024-10-03 | End: 2024-10-04 | Stop reason: HOSPADM

## 2024-10-03 RX ORDER — METOCLOPRAMIDE 10 MG/1
10 TABLET ORAL EVERY 6 HOURS PRN
Status: DISCONTINUED | OUTPATIENT
Start: 2024-10-03 | End: 2024-10-04 | Stop reason: HOSPADM

## 2024-10-03 RX ORDER — CITRIC ACID/SODIUM CITRATE 334-500MG
30 SOLUTION, ORAL ORAL
Status: DISCONTINUED | OUTPATIENT
Start: 2024-10-03 | End: 2024-10-04 | Stop reason: HOSPADM

## 2024-10-03 RX ORDER — NALOXONE HYDROCHLORIDE 0.4 MG/ML
0.4 INJECTION, SOLUTION INTRAMUSCULAR; INTRAVENOUS; SUBCUTANEOUS
Status: DISCONTINUED | OUTPATIENT
Start: 2024-10-03 | End: 2024-10-04 | Stop reason: HOSPADM

## 2024-10-03 RX ORDER — KETOROLAC TROMETHAMINE 30 MG/ML
30 INJECTION, SOLUTION INTRAMUSCULAR; INTRAVENOUS
Status: COMPLETED | OUTPATIENT
Start: 2024-10-03 | End: 2024-10-04

## 2024-10-03 RX ORDER — IBUPROFEN 800 MG/1
800 TABLET, FILM COATED ORAL
Status: COMPLETED | OUTPATIENT
Start: 2024-10-03 | End: 2024-10-04

## 2024-10-03 RX ORDER — METHYLERGONOVINE MALEATE 0.2 MG/ML
200 INJECTION INTRAVENOUS
Status: DISCONTINUED | OUTPATIENT
Start: 2024-10-03 | End: 2024-10-04 | Stop reason: HOSPADM

## 2024-10-03 RX ORDER — CARBOPROST TROMETHAMINE 250 UG/ML
250 INJECTION, SOLUTION INTRAMUSCULAR
Status: DISCONTINUED | OUTPATIENT
Start: 2024-10-03 | End: 2024-10-04 | Stop reason: HOSPADM

## 2024-10-03 RX ORDER — ACETAMINOPHEN 325 MG/1
650 TABLET ORAL EVERY 4 HOURS PRN
Status: DISCONTINUED | OUTPATIENT
Start: 2024-10-03 | End: 2024-10-04 | Stop reason: HOSPADM

## 2024-10-03 RX ORDER — OXYTOCIN/0.9 % SODIUM CHLORIDE 30/500 ML
1-24 PLASTIC BAG, INJECTION (ML) INTRAVENOUS CONTINUOUS
Status: DISCONTINUED | OUTPATIENT
Start: 2024-10-03 | End: 2024-10-03

## 2024-10-03 RX ORDER — LIDOCAINE 40 MG/G
CREAM TOPICAL
Status: DISCONTINUED | OUTPATIENT
Start: 2024-10-03 | End: 2024-10-03

## 2024-10-03 RX ORDER — TERBUTALINE SULFATE 1 MG/ML
INJECTION, SOLUTION SUBCUTANEOUS
Status: DISCONTINUED
Start: 2024-10-03 | End: 2024-10-04 | Stop reason: HOSPADM

## 2024-10-03 RX ORDER — LOPERAMIDE HYDROCHLORIDE 2 MG/1
2 CAPSULE ORAL
Status: DISCONTINUED | OUTPATIENT
Start: 2024-10-03 | End: 2024-10-04 | Stop reason: HOSPADM

## 2024-10-03 RX ORDER — LOPERAMIDE HYDROCHLORIDE 2 MG/1
4 CAPSULE ORAL
Status: DISCONTINUED | OUTPATIENT
Start: 2024-10-03 | End: 2024-10-04 | Stop reason: HOSPADM

## 2024-10-03 RX ORDER — OXYTOCIN 10 [USP'U]/ML
10 INJECTION, SOLUTION INTRAMUSCULAR; INTRAVENOUS
Status: DISCONTINUED | OUTPATIENT
Start: 2024-10-03 | End: 2024-10-04 | Stop reason: HOSPADM

## 2024-10-03 RX ORDER — PROCHLORPERAZINE MALEATE 10 MG
10 TABLET ORAL EVERY 6 HOURS PRN
Status: DISCONTINUED | OUTPATIENT
Start: 2024-10-03 | End: 2024-10-04 | Stop reason: HOSPADM

## 2024-10-03 RX ORDER — SODIUM CHLORIDE, SODIUM LACTATE, POTASSIUM CHLORIDE, CALCIUM CHLORIDE 600; 310; 30; 20 MG/100ML; MG/100ML; MG/100ML; MG/100ML
INJECTION, SOLUTION INTRAVENOUS CONTINUOUS PRN
Status: DISCONTINUED | OUTPATIENT
Start: 2024-10-03 | End: 2024-10-03

## 2024-10-03 RX ORDER — TERBUTALINE SULFATE 1 MG/ML
0.25 INJECTION, SOLUTION SUBCUTANEOUS ONCE
Status: DISCONTINUED | OUTPATIENT
Start: 2024-10-03 | End: 2024-10-03

## 2024-10-03 RX ORDER — ONDANSETRON 4 MG/1
4 TABLET, ORALLY DISINTEGRATING ORAL EVERY 6 HOURS PRN
Status: DISCONTINUED | OUTPATIENT
Start: 2024-10-03 | End: 2024-10-04 | Stop reason: HOSPADM

## 2024-10-03 RX ORDER — METOCLOPRAMIDE HYDROCHLORIDE 5 MG/ML
10 INJECTION INTRAMUSCULAR; INTRAVENOUS EVERY 6 HOURS PRN
Status: DISCONTINUED | OUTPATIENT
Start: 2024-10-03 | End: 2024-10-04 | Stop reason: HOSPADM

## 2024-10-03 RX ORDER — FENTANYL CITRATE 50 UG/ML
100 INJECTION, SOLUTION INTRAMUSCULAR; INTRAVENOUS
Status: DISCONTINUED | OUTPATIENT
Start: 2024-10-03 | End: 2024-10-04 | Stop reason: HOSPADM

## 2024-10-03 RX ORDER — OXYTOCIN/0.9 % SODIUM CHLORIDE 30/500 ML
340 PLASTIC BAG, INJECTION (ML) INTRAVENOUS CONTINUOUS PRN
Status: DISCONTINUED | OUTPATIENT
Start: 2024-10-03 | End: 2024-10-04 | Stop reason: HOSPADM

## 2024-10-03 RX ORDER — OXYTOCIN 10 [USP'U]/ML
10 INJECTION, SOLUTION INTRAMUSCULAR; INTRAVENOUS
Status: DISCONTINUED | OUTPATIENT
Start: 2024-10-03 | End: 2024-10-06 | Stop reason: HOSPADM

## 2024-10-03 RX ORDER — TRANEXAMIC ACID 10 MG/ML
1 INJECTION, SOLUTION INTRAVENOUS EVERY 30 MIN PRN
Status: DISCONTINUED | OUTPATIENT
Start: 2024-10-03 | End: 2024-10-04 | Stop reason: HOSPADM

## 2024-10-03 RX ORDER — PENICILLIN G POTASSIUM 5000000 [IU]/1
5 INJECTION, POWDER, FOR SOLUTION INTRAMUSCULAR; INTRAVENOUS ONCE
Status: COMPLETED | OUTPATIENT
Start: 2024-10-03 | End: 2024-10-03

## 2024-10-03 RX ORDER — OXYTOCIN/0.9 % SODIUM CHLORIDE 30/500 ML
100-340 PLASTIC BAG, INJECTION (ML) INTRAVENOUS CONTINUOUS PRN
Status: DISCONTINUED | OUTPATIENT
Start: 2024-10-03 | End: 2024-10-06 | Stop reason: HOSPADM

## 2024-10-03 RX ADMIN — PENICILLIN G POTASSIUM 5 MILLION UNITS: 5000000 POWDER, FOR SOLUTION INTRAMUSCULAR; INTRAPLEURAL; INTRATHECAL; INTRAVENOUS at 17:39

## 2024-10-03 RX ADMIN — PENICILLIN G 3 MILLION UNITS: 3000000 INJECTION, SOLUTION INTRAVENOUS at 21:43

## 2024-10-03 RX ADMIN — SODIUM CHLORIDE, POTASSIUM CHLORIDE, SODIUM LACTATE AND CALCIUM CHLORIDE: 600; 310; 30; 20 INJECTION, SOLUTION INTRAVENOUS at 16:59

## 2024-10-03 RX ADMIN — Medication 2 MILLI-UNITS/MIN: at 17:02

## 2024-10-03 ASSESSMENT — ACTIVITIES OF DAILY LIVING (ADL)
ADLS_ACUITY_SCORE: 18

## 2024-10-03 NOTE — PROGRESS NOTES
"External monitors placed with verbal consent from patient. Contractions appear q 2-3 minutes apart, patient reports feeling them, but denies pain. When asked if she feels like labor is starting, patient replied \"no.\" Contractions palpate mild, soft in between. Patient reports that she has not had anything to eat or drink since 0800. Assisted patient to order food and brought warm water for her to drink. Possible IV fluid bolus before starting pitocin if contractions remain close together. Bernice patch placed at 1605 in low sensitivity mode.  "

## 2024-10-03 NOTE — PROGRESS NOTES
Dr. Almazan at bedside with Dr. Cadena, performed bedside US. Vertex presentation found on US. Patient agrees to low dose pitocin induction.

## 2024-10-03 NOTE — PROGRESS NOTES
Data: [facility] to [facility]: 10/3/2024 11:07 AM. After BPP for NST d/t Post dates . Patient denies leaking of vaginal fluid/rupture of membranes, vaginal bleeding, abdominal pain, pelvic pressure, nausea, vomiting, headache, visual disturbances, epigastric or RUQ pain, significant edema. Patient reports fetal movement is normal. Patient is a 41w5d . Prenatal record reviewed. Pregnancy has been uncomplicated. Support person is not present.     Fetal HR baseline was 138 bpm. variability is moderate. Accelerations present. Decelerations:not present . Uterine assessment is moderate/strong during contractions and soft at rest. Fetal presentation per  ultrasound is transverse . Membranes: intact.    Vital signs wnl. Patient reports no pain and is coping.     Action: Verbal consent for EFM. Triage assessment completed. Will call MD.

## 2024-10-03 NOTE — PROVIDER NOTIFICATION
Dr. Cadena called and updated on BPP results, fetal position and non-reactive NST. Will updated [facility] for possible ECV.

## 2024-10-03 NOTE — H&P
Maternal Admission H&P  [facility]  Date of Admission: 10/3/2024  Date of Service: 10/3/2024    Name      Cynthia Abdul         1985  MRN       [id number]  PCP        Dr. Cadena at [facility].    ________________________________________________________________________    Assessment and Plan:  39 year old  at 41w5d here for BPP due to postdates and baby found to in transverse position.  Postdates by 12w3d ultrasound, 8 or more days discrepant from LMP (of note, originally we though LMP was 24, but pt recently reported that was when last period ended, making LMP last week of December, which would still be more than 7 days different than ultrasound date).  Pt previously declined induction, but is open to induction now as below.  Transverse fetal lie/unstable fetal lie.  Baby cephalic at last several prenatal visits, but transverse today.  She strongly prefers to avoid .  Plan will be to evaluate for external cephalic version then induce labor.  Unable to assess cervical dilation at this time (tried, but unable to reach cervix)  Group B strep: Positive; will start penicillin at time of starting induction.    Possible large for gestational age infant, with EFW on ultrasound >90%tile (4623g=50ca5ac).  This would be significantly larger than previous births, but is still in the range appropriate to attempt vaginal birth (<5,000g in patient without diabetes)  ________________________________________________________________________    Chief Complaint: transverse fetal lie on BPP.    HPI: Cynthia Abdul is a 39 year old woman at 41w5d, based on an Estimated Date of Delivery: Sep 21, 2024. She presents from ultrasound department, where she had been for BPP for post-dates.  Baby was found to be transverse. Had been cephalic in ultrasound when last check 9 days ago.    Feeling some mild contractions since she was in ultrasound.  Otherwise feeling  fine.    Patient Active Problem List    Diagnosis Date Noted    Group B Streptococcus carrier, +RV culture, currently pregnant 2024     Priority: Medium    Anemia in pregnancy 2024     Priority: Medium    Antepartum multigravida of advanced maternal age 2024     Priority: Medium    History of postpartum hemorrhage      Priority: Medium    Varicose veins during pregnancy, antepartum 2020     Priority: Medium    ASCUS of cervix with negative high risk HPV 2015     Priority: Medium     LSIL pap listed in her problem list no date or records to review   03/2/15 ASCUS Pap, Neg HR HPV   16 NIL Pap, Neg HR HPV   19 NIL Pap, Neg HR HPV   23 NIL Pap, Neg HR HPV Plan provider review  Formatting of this note might be different from the original.   Formatting of this note might be different from the original. LSIL pap listed in her problem list no date or records to review 03/2/15 ASCUS Pap, Neg HR HPV 16 NIL Pap, Neg HR HPV 19 NIL Pap, Neg HR HPV 23 NIL Pap, Neg HR HPV Plan provider review        OB History    Para Term  AB Living   8 7 7 0 0 7   SAB IAB Ectopic Multiple Live Births   0 0 0 0 7      # Outcome Date GA Lbr Librado/2nd Weight Sex Type Anes PTL Lv   8 Current            7 Term 10/31/21 40w0d 06:00 / 00:07 3.1 kg (6 lb 13.4 oz) F Vag-Spont None N GIO      Name: CRISPIN,FEMALE-[facility]      Apgar1: 8  Apgar5: 9   6 Term 20 41w0d 07:28 / 00:07 3.38 kg (7 lb 7.2 oz) M Vag-Spont None N GIO      Name: CRISPINMALE-[facility]      Apgar1: 8  Apgar5: 9   5 Term 2015 40w0d    Vag-Spont   GIO   4 Term 2005 40w0d    Vag-Spont   GIO   3 Term     M    GIO      Name: Georgette - 9   2 Term  40w0d    Vag-Spont   GIO   1 Term     M Vag-Spont   GIO      Name: Isreal Conleyjessica - 18     Review of Systems Negative except what is noted in HPI.  Past Medical History:   Diagnosis Date    Atypical squamous cells of undetermined significance (ASCUS) on Papanicolaou smear of  cervix 03/2015    Papanicolaou smear of cervix with low grade squamous intraepithelial lesion (LGSIL) 03/02/2014    Seasonal allergies     Wheezing     no documented asthma      Past Surgical History:   Procedure Laterality Date    NO PAST SURGERIES     Patient has no known allergies.  Family History   Problem Relation Age of Onset    No Known Problems Mother     No Known Problems Father      Social History     Socioeconomic History    Marital status:      Spouse name: Whitley Bach    Number of children: 6    Years of education: Not on file    Highest education level: Not on file   Occupational History    Not on file   Tobacco Use    Smoking status: Never     Passive exposure: Never    Smokeless tobacco: Never   Vaping Use    Vaping status: Never Used   Substance and Sexual Activity    Alcohol use: Never    Drug use: Never    Sexual activity: Yes     Partners: Male     Birth control/protection: None   Other Topics Concern    Not on file   Social History Narrative    Not on file     Social Determinants of Health     Financial Resource Strain: Not on file   Food Insecurity: Not on file   Transportation Needs: Not on file   Physical Activity: Not on file   Stress: Not on file   Social Connections: Unknown (6/16/2023)    Received from [facility] [employer] [facility], [facility] [employer] [facility]    Social Connections     Frequency of Communication with Friends and Family: Not on file   Interpersonal Safety: Low Risk  (10/3/2024)    Interpersonal Safety     Do you feel physically and emotionally safe where you currently live?: Yes     Within the past 12 months, have you been hit, slapped, kicked or otherwise physically hurt by someone?: No     Within the past 12 months, have you been humiliated or emotionally abused in other ways by your partner or ex-partner?: No   Housing Stability: Not on file     Prior to Admission Medication List  Medications Prior to Admission   Medication  "Sig Dispense Refill Last Dose    calcium carbonate (TUMS) 500 MG chewable tablet Take 2 tablets (1,000 mg) by mouth 2 times daily 100 tablet 3 Unknown    cholecalciferol (VITAMIN D3) 125 mcg (5000 units) capsule Take by mouth daily   More than a month    doxylamine (UNISOM) 25 MG TABS tablet Take 1 tablet (25 mg) by mouth nightly as needed 60 tablet 1 More than a month    famotidine (PEPCID) 40 MG tablet Take 1 tablet (40 mg) by mouth daily 90 tablet 3 10/2/2024    ferrous sulfate (FEROSUL) 325 (65 Fe) MG tablet Take 1 tablet (325 mg) by mouth daily (with breakfast) 90 tablet 2 Past Month    melatonin 3 MG tablet Take 3 mg by mouth nightly as needed   Past Month    Prenatal Vit-Fe Fumarate-FA (PRENATAL MULTIVITAMIN W/IRON) 27-0.8 MG tablet Take 1 tablet by mouth daily 90 tablet 3 More than a month    pyridOXINE (VITAMIN B6) 25 MG tablet Take 1 tablet (25 mg) by mouth 3 times daily 90 tablet 1 More than a month      Allergies  No Known Allergies  Flu Covid Tdap RSV   10/28/2021 02/04/2022 07/09/2024      Physical Exam  Patient Vitals for the past 24 hrs:   BP Temp Temp src Pulse Resp Height Weight   10/03/24 1120 116/75 97.8  F (36.6  C) Oral 99 17 1.727 m (5' 8\") 89.4 kg (197 lb)     Wt Readings from Last 1 Encounters:   10/03/24 89.4 kg (197 lb)   Prepregnancy: 82.6 kg (182 lb), BMI 27.68. Total gain: 6.804 kg (15 lb) (expected gain: 7 kg (15 lb)-11.5 kg (25 lb)).    HEART: RRR, no murmur  LUNGS: CTA bilaterally  Fetal Heart Tones: Cat 1  CONTRACTIONS:  irregular, every 4-6 minutes.  CERVIX: unreachable.  FLUID: None noted.  Fetal Presentation: transverse.  Labs     US OB Biophys Single Gestation Measure    Result Date: 10/3/2024  EXAM: US OB BIOPHYS SINGLE GESTATION W MEASURE LOCATION: [facility] DATE: 10/3/2024 INDICATION:  Post-term pregnancy, 40-42 weeks of gestation COMPARISON: Ultrasound 8/5/2024 FINDINGS: Single living fetus, transverse presentation. HEART RATE: 142 bpm. SDP 4.5 cm. " PLACENTA: Anterior location. No previa. CERVIX: 3.8 cm. 2/2 fetal breathing 2/2 fetal movements 2/2 fetal tone 2/2 amniotic fluid Total biophysical profile  The visualized four-chamber heart, stomach, kidneys, and bladder are within normal limits. BIOMETRY: Biparietal Diameter: 10.4 cm, 41 weeks 1 day (AUA) Head Circumference: 36.1 cm, 41 weeks 1 day (AUA) Abdominal Circumference: 38.6 cm Femur Length: 7.8 cm, 39 weeks 6 days Estimated Fetal Weight: 4623 g EFW Percentile: >90 percent EDC by prior reported datin2024 EDC by This US exam: 10/4/2024 Composite Age prior reported datin weeks 5 days Composite Age by This US: 39 weeks 6 days     IMPRESSION: 1.  Normal  biophysical profile. 2.  Single living intrauterine gestation in transverse presentation. 3.  Anterior placenta without previa. 4.  Interval growth with estimated fetal weight in the >90th percentile.        No results found for this or any previous visit (from the past 24 hour(s)).   Group B Strep PCR   Date Value Ref Range Status   2024 Positive (A) Negative Final     Comment:     ALERT: Streptococcus agalactiae (Group B Streptococcus) has a high rate of resistance to clindamycin. Therefore, clindamycin is not recommended for treatment unless susceptibility testing has been performed.      Antibody Screen   Date Value Ref Range Status   03/15/2024 Negative Negative Final     Hepatitis B Surface Antigen   Date Value Ref Range Status   03/15/2024 Nonreactive Nonreactive Final     Neisseria gonorrhoeae   Date Value Ref Range Status   2020 Negative Negative Final     Hemoglobin   Date Value Ref Range Status   2024 11.2 (L) 11.7 - 15.7 g/dL Final        Completed by:   Taisha Cadena MD  [facility]  10/3/2024 1:38 PM   used: Not needed.

## 2024-10-03 NOTE — PROGRESS NOTES
RN at bedside palpating contractions. Bernice placed in high sensitivity mode at 1646. Bernice picking up uterine activity that patient reports not feeling, RN palpating and uterus feels soft, fetal movement also palpated and observed. Bernice placed in low sensitivity mode and contractions on this setting palpate mild to moderate, likely reflecting patient's true contraction pattern. Oxytocin started at 1702.

## 2024-10-03 NOTE — PROGRESS NOTES
"LABOR PROGRESS NOTE  IUP at 41w5d     SUBJECTIVE:  Doing well.  Felt some big fetal movements.    OBJECTIVE:  /75 (BP Location: Right arm, Patient Position: Semi-Rodriguez's, Cuff Size: Adult Regular)   Pulse 99   Temp 97.8  F (36.6  C) (Oral)   Resp 17   Ht 1.727 m (5' 8\")   Wt 89.4 kg (197 lb)   LMP 12/24/2023   BMI 29.95 kg/m      Gen:  A&A, appears comfortable  Cervix:    not rechecked  Fetal status:  Intermittent monitoring normal.    ASSESSMENT:  Baby now presenting cephalic per bedside ultrasound by [facility].    PLAN:   Begin pitocin for induction.  Continue to monitor fetal presentation.  If pitocin ineffective, may need to consider cervical ripening overnight.      Taisha Cadena MD 10/03/24 3:31 PM      "

## 2024-10-04 PROBLEM — Z34.90 PREGNANT: Status: RESOLVED | Noted: 2024-10-03 | Resolved: 2024-10-04

## 2024-10-04 LAB
APTT PPP: 29 SECONDS (ref 22–38)
ERYTHROCYTE [DISTWIDTH] IN BLOOD BY AUTOMATED COUNT: 15.5 % (ref 10–15)
FIBRINOGEN PPP-MCNC: 490 MG/DL (ref 170–510)
HCT VFR BLD AUTO: 38.2 % (ref 35–47)
HGB BLD-MCNC: 11.7 G/DL (ref 11.7–15.7)
HGB BLD-MCNC: 12.4 G/DL (ref 11.7–15.7)
INR PPP: 1.06 (ref 0.85–1.15)
MCH RBC QN AUTO: 29.5 PG (ref 26.5–33)
MCHC RBC AUTO-ENTMCNC: 32.7 G/DL (ref 31.5–36.5)
MCV RBC AUTO: 90 FL (ref 78–100)
PLATELET # BLD AUTO: 167 10E3/UL (ref 150–450)
PLATELET # BLD AUTO: 183 10E3/UL (ref 150–450)
RBC # BLD AUTO: 4.2 10E6/UL (ref 3.8–5.2)
T PALLIDUM AB SER QL: NONREACTIVE
WBC # BLD AUTO: 8.9 10E3/UL (ref 4–11)

## 2024-10-04 PROCEDURE — 250N000011 HC RX IP 250 OP 636: Performed by: FAMILY MEDICINE

## 2024-10-04 PROCEDURE — 3E033VJ INTRODUCTION OF OTHER HORMONE INTO PERIPHERAL VEIN, PERCUTANEOUS APPROACH: ICD-10-PCS | Performed by: FAMILY MEDICINE

## 2024-10-04 PROCEDURE — 0UC97ZZ EXTIRPATION OF MATTER FROM UTERUS, VIA NATURAL OR ARTIFICIAL OPENING: ICD-10-PCS | Performed by: FAMILY MEDICINE

## 2024-10-04 PROCEDURE — 85018 HEMOGLOBIN: CPT | Performed by: FAMILY MEDICINE

## 2024-10-04 PROCEDURE — 36415 COLL VENOUS BLD VENIPUNCTURE: CPT | Performed by: FAMILY MEDICINE

## 2024-10-04 PROCEDURE — 250N000009 HC RX 250: Performed by: FAMILY MEDICINE

## 2024-10-04 PROCEDURE — 120N000001 HC R&B MED SURG/OB

## 2024-10-04 PROCEDURE — 59400 OBSTETRICAL CARE: CPT | Performed by: FAMILY MEDICINE

## 2024-10-04 PROCEDURE — 85049 AUTOMATED PLATELET COUNT: CPT | Performed by: FAMILY MEDICINE

## 2024-10-04 PROCEDURE — 250N000013 HC RX MED GY IP 250 OP 250 PS 637: Performed by: FAMILY MEDICINE

## 2024-10-04 PROCEDURE — 85730 THROMBOPLASTIN TIME PARTIAL: CPT | Performed by: FAMILY MEDICINE

## 2024-10-04 PROCEDURE — 85384 FIBRINOGEN ACTIVITY: CPT | Performed by: FAMILY MEDICINE

## 2024-10-04 PROCEDURE — 85610 PROTHROMBIN TIME: CPT | Performed by: FAMILY MEDICINE

## 2024-10-04 PROCEDURE — 85379 FIBRIN DEGRADATION QUANT: CPT | Performed by: FAMILY MEDICINE

## 2024-10-04 PROCEDURE — 722N000001 HC LABOR CARE VAGINAL DELIVERY SINGLE

## 2024-10-04 PROCEDURE — 2894A PR VOIDCORRECT: CPT | Performed by: FAMILY MEDICINE

## 2024-10-04 RX ORDER — MISOPROSTOL 100 UG/1
25 TABLET ORAL
Status: DISCONTINUED | OUTPATIENT
Start: 2024-10-03 | End: 2024-10-04

## 2024-10-04 RX ORDER — BISACODYL 10 MG
10 SUPPOSITORY, RECTAL RECTAL DAILY PRN
Status: DISCONTINUED | OUTPATIENT
Start: 2024-10-04 | End: 2024-10-06 | Stop reason: HOSPADM

## 2024-10-04 RX ORDER — ACETAMINOPHEN 325 MG/1
650 TABLET ORAL EVERY 4 HOURS PRN
Status: DISCONTINUED | OUTPATIENT
Start: 2024-10-04 | End: 2024-10-06 | Stop reason: HOSPADM

## 2024-10-04 RX ORDER — NALOXONE HYDROCHLORIDE 0.4 MG/ML
0.4 INJECTION, SOLUTION INTRAMUSCULAR; INTRAVENOUS; SUBCUTANEOUS
Status: DISCONTINUED | OUTPATIENT
Start: 2024-10-04 | End: 2024-10-06 | Stop reason: HOSPADM

## 2024-10-04 RX ORDER — SODIUM CHLORIDE, SODIUM LACTATE, POTASSIUM CHLORIDE, CALCIUM CHLORIDE 600; 310; 30; 20 MG/100ML; MG/100ML; MG/100ML; MG/100ML
INJECTION, SOLUTION INTRAVENOUS CONTINUOUS PRN
Status: DISCONTINUED | OUTPATIENT
Start: 2024-10-04 | End: 2024-10-04 | Stop reason: HOSPADM

## 2024-10-04 RX ORDER — PENICILLIN G POTASSIUM 5000000 [IU]/1
5 INJECTION, POWDER, FOR SOLUTION INTRAMUSCULAR; INTRAVENOUS ONCE
Status: COMPLETED | OUTPATIENT
Start: 2024-10-04 | End: 2024-10-04

## 2024-10-04 RX ORDER — PENICILLIN G 3000000 [IU]/50ML
3 INJECTION, SOLUTION INTRAVENOUS EVERY 4 HOURS
Status: DISCONTINUED | OUTPATIENT
Start: 2024-10-04 | End: 2024-10-04 | Stop reason: HOSPADM

## 2024-10-04 RX ORDER — MORPHINE SULFATE 10 MG/ML
10 INJECTION, SOLUTION INTRAMUSCULAR; INTRAVENOUS
Status: DISCONTINUED | OUTPATIENT
Start: 2024-10-03 | End: 2024-10-04

## 2024-10-04 RX ORDER — MODIFIED LANOLIN
OINTMENT (GRAM) TOPICAL
Status: DISCONTINUED | OUTPATIENT
Start: 2024-10-04 | End: 2024-10-06 | Stop reason: HOSPADM

## 2024-10-04 RX ORDER — IBUPROFEN 800 MG/1
800 TABLET, FILM COATED ORAL EVERY 6 HOURS PRN
Status: DISCONTINUED | OUTPATIENT
Start: 2024-10-04 | End: 2024-10-06 | Stop reason: HOSPADM

## 2024-10-04 RX ORDER — OXYCODONE HYDROCHLORIDE 5 MG/1
5 TABLET ORAL EVERY 4 HOURS PRN
Status: DISCONTINUED | OUTPATIENT
Start: 2024-10-04 | End: 2024-10-06 | Stop reason: HOSPADM

## 2024-10-04 RX ORDER — LIDOCAINE 40 MG/G
CREAM TOPICAL
Status: DISCONTINUED | OUTPATIENT
Start: 2024-10-04 | End: 2024-10-04 | Stop reason: HOSPADM

## 2024-10-04 RX ORDER — DOCUSATE SODIUM 100 MG/1
100 CAPSULE, LIQUID FILLED ORAL DAILY
Status: DISCONTINUED | OUTPATIENT
Start: 2024-10-05 | End: 2024-10-06 | Stop reason: HOSPADM

## 2024-10-04 RX ORDER — HYDROXYZINE HYDROCHLORIDE 50 MG/1
50 TABLET, FILM COATED ORAL
Status: DISCONTINUED | OUTPATIENT
Start: 2024-10-03 | End: 2024-10-04

## 2024-10-04 RX ORDER — NALOXONE HYDROCHLORIDE 0.4 MG/ML
0.2 INJECTION, SOLUTION INTRAMUSCULAR; INTRAVENOUS; SUBCUTANEOUS
Status: DISCONTINUED | OUTPATIENT
Start: 2024-10-04 | End: 2024-10-06 | Stop reason: HOSPADM

## 2024-10-04 RX ORDER — CETIRIZINE HYDROCHLORIDE 10 MG/1
10 TABLET ORAL DAILY
Status: DISCONTINUED | OUTPATIENT
Start: 2024-10-04 | End: 2024-10-06 | Stop reason: HOSPADM

## 2024-10-04 RX ORDER — OXYTOCIN/0.9 % SODIUM CHLORIDE 30/500 ML
1-24 PLASTIC BAG, INJECTION (ML) INTRAVENOUS CONTINUOUS
Status: DISCONTINUED | OUTPATIENT
Start: 2024-10-04 | End: 2024-10-04 | Stop reason: HOSPADM

## 2024-10-04 RX ORDER — HYDROCORTISONE 25 MG/G
CREAM TOPICAL 3 TIMES DAILY PRN
Status: DISCONTINUED | OUTPATIENT
Start: 2024-10-04 | End: 2024-10-06 | Stop reason: HOSPADM

## 2024-10-04 RX ORDER — SODIUM CHLORIDE, SODIUM LACTATE, POTASSIUM CHLORIDE, CALCIUM CHLORIDE 600; 310; 30; 20 MG/100ML; MG/100ML; MG/100ML; MG/100ML
INJECTION, SOLUTION INTRAVENOUS CONTINUOUS
Status: DISCONTINUED | OUTPATIENT
Start: 2024-10-04 | End: 2024-10-06 | Stop reason: HOSPADM

## 2024-10-04 RX ADMIN — MISOPROSTOL 25 MCG: 100 TABLET ORAL at 09:35

## 2024-10-04 RX ADMIN — PENICILLIN G 3 MILLION UNITS: 3000000 INJECTION, SOLUTION INTRAVENOUS at 16:39

## 2024-10-04 RX ADMIN — MISOPROSTOL 25 MCG: 100 TABLET ORAL at 07:35

## 2024-10-04 RX ADMIN — METHYLERGONOVINE MALEATE 200 MCG: 0.2 INJECTION INTRAVENOUS at 22:50

## 2024-10-04 RX ADMIN — HYDROXYZINE HYDROCHLORIDE 50 MG: 50 TABLET, FILM COATED ORAL at 00:16

## 2024-10-04 RX ADMIN — MISOPROSTOL 800 MCG: 200 TABLET ORAL at 22:33

## 2024-10-04 RX ADMIN — TRANEXAMIC ACID 1 G: 10 INJECTION, SOLUTION INTRAVENOUS at 22:30

## 2024-10-04 RX ADMIN — MISOPROSTOL 25 MCG: 100 TABLET ORAL at 05:15

## 2024-10-04 RX ADMIN — PENICILLIN G 3 MILLION UNITS: 3000000 INJECTION, SOLUTION INTRAVENOUS at 20:35

## 2024-10-04 RX ADMIN — MISOPROSTOL 25 MCG: 100 TABLET ORAL at 01:14

## 2024-10-04 RX ADMIN — MISOPROSTOL 25 MCG: 100 TABLET ORAL at 03:17

## 2024-10-04 RX ADMIN — CETIRIZINE HYDROCHLORIDE 10 MG: 10 TABLET, FILM COATED ORAL at 09:10

## 2024-10-04 RX ADMIN — PENICILLIN G POTASSIUM 5 MILLION UNITS: 5000000 POWDER, FOR SOLUTION INTRAMUSCULAR; INTRAPLEURAL; INTRATHECAL; INTRAVENOUS at 12:18

## 2024-10-04 ASSESSMENT — ACTIVITIES OF DAILY LIVING (ADL)
ADLS_ACUITY_SCORE: 18

## 2024-10-04 NOTE — PROGRESS NOTES
"Just talked to Dr. Cadena and updated her that patient goes between minimal and moderate variability. Contraction frequency is 3 minutes apart at this time. Pt currently has 80 ml in uterine balloon of cook catheter and 20 in vaginal balloon. Will add more to vaginal balloon as pt tolerates until we get to 80 ml. Cook catheter to come out at 2330 and will check cervix then unless it falls out prior to then. Dr. Cadena going home .    Updated Dr. Cadena that writer did not know baby sex was suppose to be a surprise for Dad Rolly and writer had asked patient in front of mother if it was a girl in order to verify sex of baby  Father of baby came out into hallway to ask and told writer he was the Dad. Writer thought he was saying he was the patient's Dad and he wanted to know if it was a girl . He said \"he does not care either way. It is okay either way.\"  Writer did not confirm either way because he sounded like he was not suppose to know and verified with patient that Rolly is baby's Dad. Rolly is baby's Dad and it was suppose to be a surprise sex for Dad. Mother knew.  Writer apologized to patient and she said it is okay, \"we are so close, he can know\"  Writer neither confirmed or denied sex of baby with Rolly officially so far.   "

## 2024-10-04 NOTE — PROCEDURES
Procedure: Transcervical Cook Catheter Balloon Placement  Indications: Postdates, unstable fetal lie    Patient has no contraindications to outpatient mechanical    Cervical Exam 1.5cm/80/-4 (<2cm)    Informed consent:  The risks have been discussed.     Procedure:  The patient was placed in the dorsal lithotomy position. Cervical exam performed, and decision made to proceed with transcervical Cook catheter placement for cervical ripening prior to induction of  labor.    Tried using speculum for insertion, but lax vaginal tissue impaired visualization of the cervix, so this was removed and  digital placement deemed appropriate based on exam.    Catheter advanced through external os until balloon confirmed just proximal to or above internal os.  Uterine balloon inflated slowly to 20ml normal saline.    RN will continue to inflate balloons to goal of 80 mL in both uterine and vaginal balloons if possible/tolerated

## 2024-10-04 NOTE — PROGRESS NOTES
Dr. Cadena at bedside for BSUS to confirm vertex and Cook catheter placement. Fetus cephalic, cervix 1.5/80/ballotable. Cook catheter placed digitally, patient tolerated well. Balloon inflated 40mL uterine/20mL vaginal. Per MD, continue to inflate per patient tolerance to a max of 80mL/80mL.

## 2024-10-04 NOTE — PROGRESS NOTES
Dr. Trotter updated, patient would like to proceed with option 1, stop pitocin and start cytotec overnight.    Verbal orders, ok to hold penicillin overnight and resume tomorrow.     Dr. Cadena to put in new orders.

## 2024-10-04 NOTE — PLAN OF CARE
Problem: Adult Inpatient Plan of Care  Goal: Plan of Care Review  Description: The Plan of Care Review/Shift note should be completed every shift.  The Outcome Evaluation is a brief statement about your assessment that the patient is improving, declining, or no change.  This information will be displayed automatically on your shift  note.  Outcome: Progressing  Flowsheets (Taken 10/4/2024 1502)  Plan of Care Reviewed With: patient  Overall Patient Progress: improving     Problem: Labor  Goal: Hemostasis  Outcome: Progressing  Goal: Stable Fetal Wellbeing  Outcome: Progressing  Goal: Effective Progression to Delivery  Outcome: Progressing  Goal: Absence of Infection Signs and Symptoms  Outcome: Progressing  Intervention: Prevent or Manage Infection  Recent Flowsheet Documentation  Taken 10/4/2024 0800 by An Andrews RN  Infection Prevention:   hand hygiene promoted   personal protective equipment utilized   rest/sleep promoted   single patient room provided  Goal: Acceptable Pain Control  Outcome: Progressing  Goal: Normal Uterine Contraction Pattern  Outcome: Progressing   Goal Outcome Evaluation:    Patient VSS, hemodynamically stable. Progress toward delivery evidenced by cervical change after misoprostol ripening, Cook catheter placed with simultaneous Pitocin, jalil regularly with increasing strength of contractions per palpation and patient report. Patient coping with distraction and bedside support person. Fetal status stable with overall Cat 1 tracing, primarily moderate variability with accelerations present. Occasional short durations of minimal variability, spontaneously resolved, rare decelerations noted. Plan to continue Cook with Pitocin titration, per MD may add additional fluid volume to vaginal balloon (Currently 20mL) up to 80 mL as needed/tolerated.       Plan of Care Reviewed With: patient    Overall Patient Progress: improvingOverall Patient Progress: improving

## 2024-10-04 NOTE — PROGRESS NOTES
Dr. Cadena updated patient max on pitocin. Cervical check-no change.     Dr. Cadena would like to know if patient would like:     Stop pitocin over night and start oral cytotec over night and reassess in morning?  Continue pitocin for another 2 hours and reassess again?

## 2024-10-04 NOTE — PROGRESS NOTES
"LABOR PROGRESS NOTE  IUP at 41w6d     SUBJECTIVE:  Contractions now strong.  Cook catheter has fallen out soon after vaginal balloon inflated to 80ml.    OBJECTIVE:  /66   Pulse 99   Temp 97.8  F (36.6  C) (Oral)   Resp 20   Ht 1.727 m (5' 8\")   Wt 89.4 kg (197 lb)   LMP 12/24/2023   BMI 29.95 kg/m      Gen:  A&A, breathes through contractions and conversant in between.  Cervix:    Per RN 5/70/-2  Fetal status:  Minimal variability at the moment, but previously Cat 1.    [place]iking: Ctx q2-3    ASSESSMENT:  Labor progressing.      PLAN:   Continue pitocin  Declines AROM for now.      Taisha Cadena MD 10/04/24 6:32 PM      "

## 2024-10-04 NOTE — PROGRESS NOTES
"Patient's , mother at bedside--brought dinner for her to eat. Patient reports that contractions are more painful now, though \"not strong.\" RN at bedside palpating contractions, patient grimacing and breathing through contraction, able to relax in between. Appears calm and comfortable.   "

## 2024-10-04 NOTE — PROGRESS NOTES
Patient reports feeling uterine contractions with pitocin but denies pain with contractions. Able to rest in bed, have phone conversations with contractions. Contractions palpate mild, abdomen soft in between. Category I tracing. VSS, afebrile. Pitocin currently at 12. Continue with protocol.

## 2024-10-04 NOTE — PLAN OF CARE
Problem: Labor  Goal: Acceptable Pain Control  Outcome: Progressing     Problem: Labor  Goal: Normal Uterine Contraction Pattern  Outcome: Progressing     Problem: Labor  Goal: Stable Fetal Wellbeing  Outcome: Progressing  Intervention: Promote and Monitor Fetal Wellbeing  Recent Flowsheet Documentation  Taken 10/4/2024 0111 by Tamika Krishnamurthy RN  Body Position: position changed independently    Goal Outcome Evaluation:      Plan of Care Reviewed With: patient    Overall Patient Progress: improvingOverall Patient Progress: improving    Outcome Evaluation: Patient able to make needs known.    Patient VSS. Started oral cytotec overnight. Per patient request hydroxyzine given to help with rest/sleep. Patient was able to rest overnight. Patient stated that she has not felt much of her contractions overnight. Plan is to reassess in the morning with provider.

## 2024-10-04 NOTE — PROGRESS NOTES
Dr. Cadena updated pt SVE is 5/70/-2 and cook catheter fell out at 1733.  Dr. Cadena to head in. She will head in to hospital

## 2024-10-04 NOTE — CONSULTS
[facility] In House OB Consult    Cynthia Abdul MRN# [id number]   Age: 39 year old YOB: 1985     Date of Admission:  10/3/2024    Primary care provider: No Ref-Primary, Physician           Chief Complaint:   Cynthia Abdul is a 39 year old female who is 41w5d pregnant and being admitted for postdates pregrnancy, mild contractions, transverse lie noted on BPP.  Prenatal care with Dr. Cadena.  Endorses lots of FM.  Largest birth about 8 pounds.  Did need cytotec and pitocin induction with one of her labors after presenting with ROM.          Pregnancy history:     OBSTETRIC HISTORY:    OB History    Para Term  AB Living   8 7 7 0 0 7   SAB IAB Ectopic Multiple Live Births   0 0 0 0 7      # Outcome Date GA Lbr Librado/2nd Weight Sex Type Anes PTL Lv   8 Current            7 Term 10/31/21 40w0d 06:00 / 00:07 3.1 kg (6 lb 13.4 oz) F Vag-Spont None N GIO      Name: CRISPINFEMALE-CARLOSITERESO      Apgar1: 8  Apgar5: 9   6 Term 20 41w0d 07:28 / 00:07 3.38 kg (7 lb 7.2 oz) M Vag-Spont None N GIO      Name: CRISPINMALE-CARLOSIYO      Apgar1: 8  Apgar5: 9   5 Term 2015 40w0d    Vag-Spont   GIO   4 Term 2005 40w0d    Vag-Spont   GIO   3 Term     M    GIO      Name: Georgette - 9   2 Term  40w0d    Vag-Spont   GIO   1 Term     M Vag-Spont   GIO      Name: Isreal Miguel - 18       EDC: Estimated Date of Delivery: 24    Prenatal Labs:   Lab Results   Component Value Date    AS Negative 10/03/2024    HEPBANG Nonreactive 03/15/2024    GCPCRT Negative 2020    HGB 12.4 10/03/2024     Active Problem List  Patient Active Problem List   Diagnosis    Varicose veins during pregnancy, antepartum    History of postpartum hemorrhage    ASCUS of cervix with negative high risk HPV    Antepartum multigravida of advanced maternal age    Anemia in pregnancy    Group B Streptococcus carrier, +RV culture, currently pregnant    Pregnant    Post-term pregnancy, 40-42 weeks of gestation     Transverse lie of fetus       Medication Prior to Admission  Medications Prior to Admission   Medication Sig Dispense Refill Last Dose    calcium carbonate (TUMS) 500 MG chewable tablet Take 2 tablets (1,000 mg) by mouth 2 times daily 100 tablet 3 Unknown    cholecalciferol (VITAMIN D3) 125 mcg (5000 units) capsule Take by mouth daily   More than a month    doxylamine (UNISOM) 25 MG TABS tablet Take 1 tablet (25 mg) by mouth nightly as needed 60 tablet 1 More than a month    famotidine (PEPCID) 40 MG tablet Take 1 tablet (40 mg) by mouth daily 90 tablet 3 10/2/2024    ferrous sulfate (FEROSUL) 325 (65 Fe) MG tablet Take 1 tablet (325 mg) by mouth daily (with breakfast) 90 tablet 2 Past Month    melatonin 3 MG tablet Take 3 mg by mouth nightly as needed   Past Month    Prenatal Vit-Fe Fumarate-FA (PRENATAL MULTIVITAMIN W/IRON) 27-0.8 MG tablet Take 1 tablet by mouth daily 90 tablet 3 More than a month    pyridOXINE (VITAMIN B6) 25 MG tablet Take 1 tablet (25 mg) by mouth 3 times daily 90 tablet 1 More than a month   .        Maternal Past Medical History:     Past Medical History:   Diagnosis Date    Atypical squamous cells of undetermined significance (ASCUS) on Papanicolaou smear of cervix 03/2015    Papanicolaou smear of cervix with low grade squamous intraepithelial lesion (LGSIL) 03/02/2014    Seasonal allergies     Wheezing     no documented asthma                        Physical Exam:   Vitals were reviewed  Bedside ultrasound performed. Abdomen is gravid and lax.  Presentation appears cephalic/slightly oblique to patient right.  With leopolds only, the vertex is brought to midline.                      Assessment:   Cynthia Abdul is a 41w5d pregnant female admitted with postdated pregnancy and unstable lie, currently cephalic.          Plan:   Continue care with family medicine. Patient agreeable to proceed with induction.  Discussed if fetal malpresentation consideration for version if in early labor but  discussed increased risk of  section as well.  Briefly discussed if  is indicated goal is to preserve option for TOLAC in future.  All questions answered.  We are available if any further needs arise.    Florinda Almazan MD    Total time at bedside and coordination of care is 25 minutes.      Alert and oriented to person, place and time

## 2024-10-04 NOTE — PROGRESS NOTES
"LABOR PROGRESS NOTE  IUP at 41w6d     SUBJECTIVE:  Did not end up getting into labor on Pitocin yesterday, so was switched to oral Cytotec overnight.  She notes that feet she feels contractions as tightening but they are not really strong or painful.      OBJECTIVE:  /67   Pulse 99   Temp 99  F (37.2  C) (Oral)   Resp 19   Ht 1.727 m (5' 8\")   Wt 89.4 kg (197 lb)   LMP 12/24/2023   BMI 29.95 kg/m      Gen:  Awake alert in no acute distress  Abd: Leopold's reveals fetal head now more centered, cephalic presentation.  Cervix:    1.5, 80, ballotable.  Fetal head palpable.  Fetal status: EFM category 1 induction of labor  Bedside ultrasound shows cephalic presentation.      ASSESSMENT:  39-year-old G8 para 7-0-0-7 at 41 weeks 6 days being induced for postdates plus unstable fetal lie (was transverse yesterday and spontaneously converted to cephalic).  Admission cervix was very unfavorable.  Not getting strong contractions with oral Cytotec.  Discussed the possibility of continuing oral Cytotec for 24 hours and reassessing and possibly doing vaginal Cervidil overnight, but patient prefers to start more aggressive measures today to improve chances of delivery today or soon.  Discussed alternate possibility of Cook catheter plus Pitocin, and patient opted this route.  Please see Cook catheter procedure note.     PLAN:   Resume induction with IV Pitocin, Cook catheter placed to improve cervical dilation as well.   Will restart IV penicillin, which had been held overnight since she was not in labor.      Taisha Cadena MD 10/04/24 11:43 AM      "

## 2024-10-05 PROBLEM — O32.2XX0 TRANSVERSE LIE OF FETUS: Status: RESOLVED | Noted: 2024-10-03 | Resolved: 2024-10-05

## 2024-10-05 PROBLEM — O48.0 POST-TERM PREGNANCY, 40-42 WEEKS OF GESTATION: Status: RESOLVED | Noted: 2024-10-03 | Resolved: 2024-10-05

## 2024-10-05 PROBLEM — D62 ANEMIA DUE TO BLOOD LOSS, ACUTE: Status: ACTIVE | Noted: 2024-10-05

## 2024-10-05 LAB
D DIMER PPP FEU-MCNC: 2.63 UG/ML FEU (ref 0–0.5)
ERYTHROCYTE [DISTWIDTH] IN BLOOD BY AUTOMATED COUNT: 15.3 % (ref 10–15)
HCT VFR BLD AUTO: 29.8 % (ref 35–47)
HGB BLD-MCNC: 9.8 G/DL (ref 11.7–15.7)
MCH RBC QN AUTO: 29.6 PG (ref 26.5–33)
MCHC RBC AUTO-ENTMCNC: 32.9 G/DL (ref 31.5–36.5)
MCV RBC AUTO: 90 FL (ref 78–100)
PLATELET # BLD AUTO: 155 10E3/UL (ref 150–450)
RBC # BLD AUTO: 3.31 10E6/UL (ref 3.8–5.2)
WBC # BLD AUTO: 10.4 10E3/UL (ref 4–11)

## 2024-10-05 PROCEDURE — 258N000003 HC RX IP 258 OP 636: Performed by: FAMILY MEDICINE

## 2024-10-05 PROCEDURE — 722N000001 HC LABOR CARE VAGINAL DELIVERY SINGLE

## 2024-10-05 PROCEDURE — 85027 COMPLETE CBC AUTOMATED: CPT | Performed by: FAMILY MEDICINE

## 2024-10-05 PROCEDURE — 36415 COLL VENOUS BLD VENIPUNCTURE: CPT | Performed by: FAMILY MEDICINE

## 2024-10-05 PROCEDURE — 120N000001 HC R&B MED SURG/OB

## 2024-10-05 PROCEDURE — 250N000013 HC RX MED GY IP 250 OP 250 PS 637: Performed by: FAMILY MEDICINE

## 2024-10-05 RX ORDER — HYDROXYZINE HYDROCHLORIDE 50 MG/1
50 TABLET, FILM COATED ORAL EVERY 6 HOURS PRN
Status: DISCONTINUED | OUTPATIENT
Start: 2024-10-05 | End: 2024-10-06 | Stop reason: HOSPADM

## 2024-10-05 RX ORDER — HYDROCODONE BITARTRATE AND ACETAMINOPHEN 5; 325 MG/1; MG/1
1-2 TABLET ORAL EVERY 6 HOURS PRN
Status: DISCONTINUED | OUTPATIENT
Start: 2024-10-05 | End: 2024-10-06 | Stop reason: HOSPADM

## 2024-10-05 RX ORDER — HYDROXYZINE HYDROCHLORIDE 25 MG/1
25 TABLET, FILM COATED ORAL
Status: DISCONTINUED | OUTPATIENT
Start: 2024-10-05 | End: 2024-10-06 | Stop reason: HOSPADM

## 2024-10-05 RX ADMIN — SODIUM CHLORIDE, POTASSIUM CHLORIDE, SODIUM LACTATE AND CALCIUM CHLORIDE: 600; 310; 30; 20 INJECTION, SOLUTION INTRAVENOUS at 00:08

## 2024-10-05 RX ADMIN — HYDROXYZINE HYDROCHLORIDE 25 MG: 25 TABLET ORAL at 22:50

## 2024-10-05 RX ADMIN — IBUPROFEN 800 MG: 800 TABLET ORAL at 08:34

## 2024-10-05 RX ADMIN — DOCUSATE SODIUM 100 MG: 100 CAPSULE, LIQUID FILLED ORAL at 08:35

## 2024-10-05 RX ADMIN — IBUPROFEN 800 MG: 800 TABLET ORAL at 02:21

## 2024-10-05 RX ADMIN — HYDROCODONE BITARTRATE AND ACETAMINOPHEN 1 TABLET: 5; 325 TABLET ORAL at 00:37

## 2024-10-05 RX ADMIN — HYDROCODONE BITARTRATE AND ACETAMINOPHEN 1 TABLET: 5; 325 TABLET ORAL at 18:43

## 2024-10-05 RX ADMIN — BENZOCAINE AND LEVOMENTHOL: 200; 5 SPRAY TOPICAL at 00:05

## 2024-10-05 RX ADMIN — IBUPROFEN 800 MG: 800 TABLET ORAL at 21:06

## 2024-10-05 RX ADMIN — Medication: at 00:04

## 2024-10-05 RX ADMIN — IBUPROFEN 800 MG: 800 TABLET ORAL at 14:57

## 2024-10-05 RX ADMIN — WITCH HAZEL: 500 SOLUTION RECTAL; TOPICAL at 00:04

## 2024-10-05 RX ADMIN — HYDROCODONE BITARTRATE AND ACETAMINOPHEN 1 TABLET: 5; 325 TABLET ORAL at 06:25

## 2024-10-05 RX ADMIN — HYDROCODONE BITARTRATE AND ACETAMINOPHEN 1 TABLET: 5; 325 TABLET ORAL at 12:40

## 2024-10-05 ASSESSMENT — ACTIVITIES OF DAILY LIVING (ADL)
ADLS_ACUITY_SCORE: 18

## 2024-10-05 NOTE — PLAN OF CARE
Problem: Adult Inpatient Plan of Care  Goal: Optimal Comfort and Wellbeing  Outcome: Progressing  Intervention: Provide Person-Centered Care  Recent Flowsheet Documentation  Taken 10/4/2024 1600 by Dinora Melissa RN  Trust Relationship/Rapport:   care explained   choices provided   emotional support provided   empathic listening provided   questions answered   questions encouraged   reassurance provided   thoughts/feelings acknowledged   Goal Outcome Evaluation:      Plan of Care Reviewed With: patient          Outcome Evaluation: patient is unmedicated for labor so far.  planning on unmedicated birth and aware of pain medication options.

## 2024-10-05 NOTE — PLAN OF CARE
Problem: Adult Inpatient Plan of Care  Goal: Plan of Care Review  Description: The Plan of Care Review/Shift note should be completed every shift.  The Outcome Evaluation is a brief statement about your assessment that the patient is improving, declining, or no change.  This information will be displayed automatically on your shift  note.  Outcome: Progressing     Problem: Labor  Goal: Stable Fetal Wellbeing  Outcome: Progressing  Intervention: Promote and Monitor Fetal Wellbeing  Recent Flowsheet Documentation  Taken 10/4/2024 2001 by Radha Rodríguez RN  Body Position: position changed independently  Fetal Wellbeing Promotion:   fetal heart rate monitored   intake and output monitored   uterine contraction activity assessed     Problem: Labor  Goal: Acceptable Pain Control  Outcome: Progressing  Intervention: Support Labor Pain Coping and Management  Recent Flowsheet Documentation  Taken 10/4/2024 2001 by Radha Rodríguez RN  Sensory Stimulation Regulation:   care clustered   lighting decreased   quiet environment promoted     Problem: Labor  Goal: Normal Uterine Contraction Pattern  Outcome: Progressing  Intervention: Monitor and Manage Uterine Activity  Recent Flowsheet Documentation  Taken 10/4/2024 2001 by Radha Rodríguez RN  Fluid/Electrolyte Management: fluids provided   Goal Outcome Evaluation:       VSS, pt had a  after Pitocin IOL, , category 1 tracing. Pt had SROM and delivery of female infant at the same time, moderate MSF, infant cried spontaneously at delivery. Dr SHARON Cadena was present at that time. Pt did have an increase in vaginal bleeding and clots following placenta and was medicated with Pitocin IV, TXA IV, Cytotec rectally and then Methergine IM. FF3/U with sm rubra flow, no clots or free flow at this time. QBL was a tl of 1339, Dr SHARON Cadena is aware. Pt was watched closely.

## 2024-10-05 NOTE — PLAN OF CARE
Goal Outcome Evaluation:      Plan of Care Reviewed With: patient    Overall Patient Progress: improvingOverall Patient Progress: improving    Outcome Evaluation: VSS. Patient reports uterine cramping, managed with prn medication and heating pad. Fundus firm and bleeding is appropriate. Patient is voiding. Breastfeeding and formula feeding . Bonding well with baby. Mother is present and supportive at bedside.          Problem: Postpartum (Vaginal Delivery)  Goal: Successful Parent Role Transition  Outcome: Progressing     Problem: Postpartum (Vaginal Delivery)  Goal: Optimal Pain Control and Function  Outcome: Progressing  Intervention: Prevent or Manage Pain  Recent Flowsheet Documentation  Taken 10/5/2024 0221 by Yusuf Shafer, RN  Pain Management Interventions:   medication (see MAR)   rest     Problem: Postpartum (Vaginal Delivery)  Goal: Effective Urinary Elimination  Outcome: Progressing

## 2024-10-05 NOTE — PLAN OF CARE
Problem: Adult Inpatient Plan of Care  Goal: Optimal Comfort and Wellbeing  10/5/2024 0220 by Radha Rodríguez RN  Outcome: Progressing  10/4/2024 2023 by Radha Rodríguez RN  Outcome: Progressing  Intervention: Monitor Pain and Promote Comfort  Recent Flowsheet Documentation  Taken 10/4/2024 2001 by Radha Rodríguez RN  Pain Management Interventions:   care clustered   environmental changes   emotional support   pillow support provided   quiet environment facilitated   relaxation techniques promoted   repositioned  Intervention: Provide Person-Centered Care  Recent Flowsheet Documentation  Taken 10/4/2024 2001 by Radha Rodríguez RN  Trust Relationship/Rapport:   care explained   choices provided   emotional support provided   empathic listening provided   questions answered   questions encouraged   reassurance provided   thoughts/feelings acknowledged     Problem: Postpartum (Vaginal Delivery)  Goal: Effective Urinary Elimination  Outcome: Progressing  Intervention: Monitor and Manage Urinary Retention  Recent Flowsheet Documentation  Taken 10/5/2024 0145 by Radha Rodríguez RN  Urinary Elimination Promotion:   frequent voiding encouraged   positioned for ease of voiding   toileting offered   voiding relaxation promoted     Problem: Postpartum (Vaginal Delivery)  Goal: Successful Parent Role Transition  Outcome: Progressing  Intervention: Support Parent Role Transition  Recent Flowsheet Documentation  Taken 10/5/2024 0145 by Radha Rodríguez RN  Supportive Measures:   verbalization of feelings encouraged   self-responsibility promoted   self-care encouraged   relaxation techniques promoted  Parent-Child Attachment Promotion:   face-to-face positioning promoted   parent/caregiver presence encouraged   interaction encouraged   participation in care promoted   positive reinforcement provided   rooming-in promoted   skin-to-skin contact encouraged   strengths emphasized   cue recognition promoted     Problem:  Breastfeeding  Goal: Effective Breastfeeding  Outcome: Progressing  Intervention: Promote Breast Care and Comfort  Recent Flowsheet Documentation  Taken 10/4/2024 2300 by Radha Rodríguez RN  Breast Care: Breastfeeding: open to air  Intervention: Promote Effective Breastfeeding  Recent Flowsheet Documentation  Taken 10/5/2024 0145 by Radha Rodríguez RN  Parent-Child Attachment Promotion:   face-to-face positioning promoted   parent/caregiver presence encouraged   interaction encouraged   participation in care promoted   positive reinforcement provided   rooming-in promoted   skin-to-skin contact encouraged   strengths emphasized   cue recognition promoted  Taken 10/4/2024 2300 by Radha Rodríguez RN  Breastfeeding Assistance:   assisted with positioning   infant latch-on verified   infant suck/swallow verified   support offered  Intervention: Support Exclusive Breastfeeding Success  Recent Flowsheet Documentation  Taken 10/5/2024 0145 by Radha Rodríguez RN  Supportive Measures:   verbalization of feelings encouraged   self-responsibility promoted   self-care encouraged   relaxation techniques promoted  Taken 10/4/2024 2300 by Radha Rodríguez RN  Breastfeeding Support:   infant-mother separation minimized   diary/feeding log utilized   encouragement provided   maternal hydration promoted   maternal nutrition promoted   maternal rest encouraged      Goal Outcome Evaluation:      Plan of Care Reviewed With: patient    Overall Patient Progress: improvingOverall Patient Progress: improving     Pt was eating and requested I wait to change her pad at 0030. Mother is present and supportive. Pt ambulated to the bathroom and voided 900 ml and had 1 lg clot  prune size. FF3/U with sm rubra flow. Breast fed infant well. Stable postpartum recovery following PPH. Medicated with 1 Norco for uterine cramping, states had some relief. Pt had refused Ibuprofen and Tylenol. Pt was GBS+ with adequate treatment.

## 2024-10-05 NOTE — PLAN OF CARE
Goal Outcome Evaluation:      Plan of Care Reviewed With: patient    Overall Patient Progress: improving    Outcome Evaluation: VSS. Fundus firm. Pt reports tolerable pain control.    VSS. Fundus firm. Ambulating independently in the room. Pt reports tolerable pain control with ibuprofen and norco use. Pt reports that the cramping is intermittent and when it is happening pain is a 7/10 and when she does not have the cramping she reports that she has no pain. The pt is breastfeeding and supplementing with formula per pt preference. RN educated the pt numerous times on waking the infant to feed Q 2-3 hours and mother verbalized understanding.

## 2024-10-05 NOTE — PROGRESS NOTES
Dr. Cadena was just in checking pt . Pt felt a little pushy. Having a hard time monitoring patient while patient is standing and have explained and would like to place FSE.  Able to trace baby in bed.  Patient declines FSE at this time and will stay in bed.

## 2024-10-05 NOTE — PROVIDER NOTIFICATION
Taisha Cadena MD notified of BP of 86/59, patient asymptomatic and HR WDL. Per MD, continue to monitor with current plan of care and please change order parameter to not notify unless BP is < 85 systolic and/or < 45 diastolic.     CHIQUI GARCIA RN on 10/5/2024 at 5:30 PM

## 2024-10-05 NOTE — PROGRESS NOTES
Data: Cynthia Abdul transferred to Rm 18 via wheelchair at 0150. Baby transferred via crib.  Action: Receiving unit notified of transfer: Yes. Patient and family notified of room change. Report given to Yusuf CARMONA RN at 0155. Belongings sent to receiving unit. Accompanied by Registered Nurse. Oriented patient to surroundings. Call light within reach. ID bands double-checked with receiving RN.  Response: Patient tolerated transfer and is stable.

## 2024-10-05 NOTE — L&D DELIVERY NOTE
OB Vaginal Delivery Note    Cynthia Abdul MRN# [id number]   Age: 39 year old YOB: 1985       GA: 41w6d  GP:   Labor Complications:  Postpartum hemorrhage  Delivery QBL: Approximately 1300 mL; see chart  Delivery Type: Vaginal, Spontaneous   ROM to Delivery Time: rupture date or rupture time have not been documented  [place] Weight: 3.5 kg (7 lb 11.5 oz)    1 Minute 5 Minute 10 Minute   Apgar Totals: 9   9        MISTI MALCOLM     Delivery Details:  Cynthia Abdul, a 39 year old  41w6d who was admitted from radiology department yesterday when baby was found to be in the transverse position and BPP.  Although well past her due date, she had hoped to delay induction as long as possible but was checking on baby's well being with BPP.    Of note, ultrasound estimated fetal weight was >90%tile at 4623g.    She was sent to labor and delivery where fetal status was reassuring.  After several hours in the department, she felt a big fetal movement and baby's head was found to be in the cephalic presentation.  She was agreeable to induction at that point, both because of unstable fetal lie as well as postdates.  She was given IV Pitocin but after  8 to 9 hours cervix was only 0.5 cm dilated.  It was getting late at night, so Pitocin was stopped in favor of oral Cytotec for cervical ripening.  After about 12 hours of cervical ripening with Cytotec, cervix was 1.5/70/-3 and she was not feeling contractions.  Cook catheter was placed for mechanical cervical dilation and Pitocin was begun.  Cook catheter was expelled after about 5 to 6 hours and cervix was 5 found to be 5 cm.    She continued to labor but baby remained very high/ballotable.  She was just about to get IV fentanyl for pain when she had a sudden large gush of fluid and the [id number] baby girl immediately delivered onto the bed.  Baby was vigorous baby girl with Apgars of 9 and 9 delivered in the TIARA position.  There was a single nuchal cord  that was easily reduced immediately after delivery.  She basically did not push or push once.  Prior to that, last cervical exam had been 7 to 8 cm, 80%, -2.  TXA soon for door delivery had been planned, but was not given due to rapid change right before delivery.    Cord complications: none   Placenta delivered at 10/4/2024 10:19 PM . Placental disposition was Hospital disposal . Fundal massage performed and fundus was intermittently boggy, then generally boggy.  Patient had difficult time tolerating fundal massage.  As bleeding continued she got IV Pitocin, TXA, and rectal Cytotec.  She then was able to eventually tolerate fundal sweeps that expressed large amounts of clot, with significant improvement in her pain as well as her bleeding after that.  She did have some bleeding after that, though, so a dose of Methergine was given as well.  She remained hemodynamically stable throughout.    Episiotomy: none    Perineum, vagina, cervix were inspected, and the following lacerations were noted:   Perineal lacerations: none                  Excellent hemostasis was noted. Needle count correct. Infant and patient in delivery room in good and stable condition.        Bridger Abdul-Cynthia [[id number]]      Labor Event Times      Active labor onset date: 10/4/24 Onset time:  7:14 PM   Dilation complete date: 10/4/24 Complete time: 10:12 PM   Start pushing date/time: 10/4/2024 2212          Labor Events     labor?: No   steroids: None  Labor Type: Induction/Cervical ripening  Predominate monitoring during 1st stage: continuous electronic fetal monitoring     Antibiotics received during labor?: Yes  Reason for Antibiotics: GBS  Antibiotics received for GBS: Penicillin  Antibiotics Given (GBS): Greater than 4 hours prior to delivery       Rupture date/time: 10/4/24 2212   Rupture type: Spontaneous Rupture of Membranes  Fluid color: Meconium  Fluid odor: Normal     Induction: Oxytocin, Misoprostol, Mechanical  "ripening agent  Mechanical ripening occurred: In hospital  Induction date/time: 10/3/24 1702    Cervical ripening date/time: 10/4/24 1135    Indications for induction: Post-term Gestation, Other Pregnant Patient Indications (Comment to specify)       Delivery/Placenta Date and Time      Delivery Date: 10/4/24 Delivery Time: 10:12 PM   Placenta Date/Time: 10/4/2024 10:19 PM  Oxytocin given at the time of delivery: after delivery of baby  Delivering clinician: Taisha Cadena MD   Other personnel present at delivery:  Provider Role   Radha Rodríguez RN Registered Nurse             Vaginal Counts       Initial count performed by 2 team members:  Two Team Members   Dr SHARON Rodríguez RNC         [place] Suture Needles Sponges (RETIRED) Instruments   Initial counts   5    Added to count       Relief counts       Final counts   5            Placed during labor Accounted for at the end of labor   FSE No NA   IUPC No NA   Cervidil No NA                  Final count performed by 2 team members:  Two Team Members   Dr SHARNO GOMEZ      Final count correct?: Yes  Pre-Birth Team Brief: Complete  Post-Birth Team Debrief: Complete       Apgars    Living status: Living   1 Minute 5 Minute 10 Minute 15 Minute 20 Minute   Skin color: 1  1       Heart rate: 2  2       Reflex irritability: 2  2       Muscle tone: 2  2       Respiratory effort: 2  2       Total: 9  9       Apgars assigned by: RADHA GOMEZ       Cord      Vessels: 3 Vessels    Cord Complications: None               Cord Blood Disposition: Discard    Gases Sent?: No    Delayed cord clamping?: Yes    Cord Clamping Delay (seconds):  seconds    Stem cell collection?: No            Resuscitation    Methods: None  Output in Delivery Room: Voided, Stool       [place] Measurements      Weight: 7 lb 11.5 oz Length: 1' 7.29\"     Head circumference: 36.5 cm    Output in delivery room: Voided, Stool       Skin to Skin and Feeding " Plan      Skin to skin initiation date/time: 1/10/1841    Skin to skin with: Mother  Skin to skin end date/time:     Breastfeeding initiated date/time: 10/4/2024 2300       Labor Events and Shoulder Dystocia    Fetal Tracing Prior to Delivery: Category 1, Category 2       Delivery (Maternal) (Provider to Complete) ([id number])    Episiotomy: None  Perineal lacerations: None    Repair suture: None  Genital tract inspection done: Pos       Blood Loss  Mother: Cynthia Abdul #[id number]     Start of Mother's Information      Delivery Blood Loss  10/04/24 1914 - 10/04/24 2321      Delivery QBL (mL) Hospital Encounter 1339 mL    Total  1339 mL               End of Mother's Information  Mother: Cynthia Abdul #[id number]                Delivery - Provider to Complete ([id number])    Delivering clinician: Taisha Cadena MD  Delivery Type (Choose the 1 that will go to the Birth History): Vaginal, Spontaneous                         Other personnel:  Provider Role   Radha Rodríguez RN Registered Nurse                    Placenta    Date/Time: 10/4/2024 10:19 PM  Removal: Spontaneous  Disposition: Hospital disposal             Anesthesia    Method: None                    Presentation and Position    Presentation: Vertex    Position: Left Occiput Anterior                     Taisha Cadena MD

## 2024-10-05 NOTE — PLAN OF CARE
"Goal Outcome Evaluation: Progressing      Plan of Care Reviewed With: patient    Overall Patient Progress: improving    Patient's VSS; BP has been intermittently low, MD aware see previous note. Fundus is firm 1 cm below umbilicus with scant lochia. Patient is up ad jimenez and performing self-cares independently. Reporting uterine cramping pain being treated with Norco and ibuprofen, patient declines any further pain meds or interventions at this time. Patient's mother is in the room with her and infant, attentive/supportive and participating in cares.    Patient was encouraged to begin pumping when infant receives formula. Patient states she would like to eventually, but not at this time.     Patient aware to fill out ROP, birth certificate and PPMA; has not yet completed them.    BP 98/59 (BP Location: Right arm, Patient Position: Semi-Rodriguez's, Cuff Size: Adult Regular)   Pulse 86   Temp 97.6  F (36.4  C) (Oral)   Resp 18   Ht 1.727 m (5' 8\")   Wt 89.4 kg (197 lb)   LMP 12/24/2023   SpO2 99%   Breastfeeding Unknown   BMI 29.95 kg/m      CHIQUI GARCIA RN on 10/5/2024 at 9:24 PM      Problem: Adult Inpatient Plan of Care  Goal: Optimal Comfort and Wellbeing  Outcome: Progressing     Problem: Breastfeeding  Goal: Effective Breastfeeding  Outcome: Progressing     "

## 2024-10-05 NOTE — PROGRESS NOTES
"LABOR PROGRESS NOTE  IUP at 41w6d     SUBJECTIVE:  Contractions strong    OBJECTIVE:  /66   Pulse 99   Temp 97.8  F (36.6  C) (Oral)   Resp 20   Ht 1.727 m (5' 8\")   Wt 89.4 kg (197 lb)   LMP 12/24/2023   BMI 29.95 kg/m      Gen:  Breathing and vocalizing through conractions  Cervix:    6/70/-3  Fetal status:  Cat 1 when tracing.  Wasn't tracing well while standing.    ASSESSMENT:  Labor progressing   Offered FSE to help continue to monitor baby and enable her to remain out of bed (unable to trace FHR while stadning); pt prefers to stay in bed for tracing for now.    PLAN:   Continue dave Cadena MD 10/04/24 7:40 PM      "

## 2024-10-06 VITALS
DIASTOLIC BLOOD PRESSURE: 49 MMHG | HEART RATE: 86 BPM | OXYGEN SATURATION: 98 % | BODY MASS INDEX: 28.99 KG/M2 | TEMPERATURE: 97.6 F | HEIGHT: 68 IN | SYSTOLIC BLOOD PRESSURE: 104 MMHG | RESPIRATION RATE: 18 BRPM | WEIGHT: 191.3 LBS

## 2024-10-06 LAB — HGB BLD-MCNC: 9 G/DL (ref 11.7–15.7)

## 2024-10-06 PROCEDURE — 250N000013 HC RX MED GY IP 250 OP 250 PS 637: Performed by: FAMILY MEDICINE

## 2024-10-06 PROCEDURE — 85018 HEMOGLOBIN: CPT | Performed by: FAMILY MEDICINE

## 2024-10-06 PROCEDURE — 36415 COLL VENOUS BLD VENIPUNCTURE: CPT | Performed by: FAMILY MEDICINE

## 2024-10-06 RX ORDER — FERROUS SULFATE 325(65) MG
325 TABLET ORAL DAILY
Qty: 90 TABLET | Refills: 0 | Status: SHIPPED | OUTPATIENT
Start: 2024-10-06 | End: 2024-10-23

## 2024-10-06 RX ORDER — HYDROXYZINE HYDROCHLORIDE 25 MG/1
25 TABLET, FILM COATED ORAL
Qty: 20 TABLET | Refills: 1 | Status: SHIPPED | OUTPATIENT
Start: 2024-10-06 | End: 2024-10-23

## 2024-10-06 RX ORDER — METOCLOPRAMIDE 5 MG/1
TABLET ORAL
Qty: 14 TABLET | Refills: 0 | Status: SHIPPED | OUTPATIENT
Start: 2024-10-06 | End: 2024-10-23

## 2024-10-06 RX ORDER — IBUPROFEN 600 MG/1
600 TABLET, FILM COATED ORAL EVERY 6 HOURS PRN
Qty: 30 TABLET | Refills: 0 | Status: SHIPPED | OUTPATIENT
Start: 2024-10-06 | End: 2024-10-20

## 2024-10-06 RX ORDER — OXYCODONE HYDROCHLORIDE 5 MG/1
5 TABLET ORAL EVERY 4 HOURS PRN
Qty: 10 TABLET | Refills: 0 | Status: SHIPPED | OUTPATIENT
Start: 2024-10-06 | End: 2024-10-23

## 2024-10-06 RX ADMIN — HYDROCODONE BITARTRATE AND ACETAMINOPHEN 1 TABLET: 5; 325 TABLET ORAL at 20:47

## 2024-10-06 RX ADMIN — HYDROXYZINE HYDROCHLORIDE 25 MG: 25 TABLET ORAL at 00:20

## 2024-10-06 RX ADMIN — IBUPROFEN 800 MG: 800 TABLET ORAL at 11:28

## 2024-10-06 RX ADMIN — IBUPROFEN 800 MG: 800 TABLET ORAL at 17:41

## 2024-10-06 RX ADMIN — DOCUSATE SODIUM 100 MG: 100 CAPSULE, LIQUID FILLED ORAL at 11:27

## 2024-10-06 ASSESSMENT — ACTIVITIES OF DAILY LIVING (ADL)
ADLS_ACUITY_SCORE: 18

## 2024-10-06 NOTE — PLAN OF CARE
Goal Outcome Evaluation: Adequate for care transition      Plan of Care Reviewed With: patient    Overall Patient Progress: improving      D:  Patient desires discharge home.  Discharge orders received and entered by provider.  A:  Discharge instructions reviewed with the patient.  All questions and concerns addressed.  R:  Discharge criteria met.  4 Part ID bands double checked.  [place] discharged in car seat with parents.  The nursing assistant escorted patient to car at 21:00 .      CHIQUI GARCIA RN on 10/6/2024 at 9:00 PM    Problem: Adult Inpatient Plan of Care  Goal: Readiness for Transition of Care  Outcome: Adequate for Care Transition

## 2024-10-06 NOTE — LACTATION NOTE
This note was copied from a baby's chart.  Initial Lactation Visit    Current age: 2 day old old    Gestational age at delivery: 41w6d     Diaper count: 5 wet 2 soiled      Feedings: 2 breast  10 supplement      Weight Loss: 5.5% at second weight up from 24 hour weight    Breastfeeding goals:breast + formula    Past breastfeeding experience: never had enough breastmilk for other children always used formula.      Maternal health risk that may affect breastfeeding:AMA    Home Pump:   Lactation distributed  mother a breast pump from [facility].   Mother verbalizes understanding of how to use the breast pump.  She was instructed to empty her breasts 8-12 times in 24 hours, either with the baby at the breast or the breast pump, if she wants to make milk for her .       Feeding assessment: Mother declined support with latch reporting she does not have breastmilk.      She did ask how she could make more breastmilk, encouraged her to pump for any supplement- she did not want to do the pumping and ask if there was any foods that she could eat, briefly reviewed a few options but reinforced that without stimulation to the breast from either the baby and/or the breastpump her supply might not increase.      Feeding plan:  Offer breast each feeding, supplement after each feeding and pump (to support and promote) milk suppy.      Education:   [] Expected  feeding patterns in the first few days (pg. 38 of Your Guide to To Postpartum and [place] Care)/ the Second Night  [] Stages of milk production  [] Hand expression   [] Feeding cues     [] Benefits of skin to skin  [] Breastfeeding positions  [] Tips to get and maintain a deep latch  [] Usage of nipple shield  [] Nutritive vs.non-nutritive sucking  [] Gentle breast compressions as needed  [] How to tell when baby is finished  [] Supplementation  [] Paced bottle feeding  [] Spoon/cup feeding  [] LPI feeding patterns  [] LBW feeding patterns  []  Expected  output  [] [place] weight loss  [] Infant Feeding Log  [] Calming techniques  [] Engorgement/Reverse Pressure Softening  [] Pumping  [] Breastpump education  [] Inpatient breastfeeding support  [] Outpatient lactation resources    Follow up: Will follow up with OP LC as needed

## 2024-10-06 NOTE — DISCHARGE INSTRUCTIONS
Warning Signs after Having a Baby    Keep this paper on your fridge or somewhere else where you can see it.    Call your provider if you have any of these symptoms up to 12 weeks after having your baby.    Thoughts of hurting yourself or your baby  Pain in your chest or trouble breathing  Severe headache not helped by pain medicine  Eyesight concerns (blurry vision, seeing spots or flashes of light, other changes to eyesight)  Fainting, shaking or other signs of a seizure    Call 9-1-1 if you feel that it is an emergency.     The symptoms below can happen to anyone after giving birth. They can be very serious. Call your provider if you have any of these warning signs.    My provider s phone number: _______________________    Losing too much blood (hemorrhage)    Call your provider if you soak through a pad in less than an hour or pass blood clots bigger than a golf ball. These may be signs that you are bleeding too much.    Blood clots in the legs or lungs    After you give birth, your body naturally clots its blood to help prevent blood loss. Sometimes this increased clotting can happen in other areas of the body, like the legs or lungs. This can block your blood flow and be very dangerous.     Call your provider if you:  Have a red, swollen spot on the back of your leg that is warm or painful when you touch it.   Are coughing up blood.     Infection    Call your provider if you have any of these symptoms:  Fever of 100.4 F (38 C) or higher.  Pain or redness around your stitches if you had an incision.   Any yellow, white, or green fluid coming from places where you had stitches or surgery.    Mood Problems (postpartum depression)    Many people feel sad or have mood changes after having a baby. But for some people, these mood swings are worse.     Call your provider right away if you feel so anxious or nervous that you can't care for yourself or your baby.    Preeclampsia (high blood pressure)    Even if you  didn't have high blood pressure when you were pregnant, you are at risk for the high blood pressure disease called preeclampsia. This risk can last up to 12 weeks after giving birth.     Call your provider if you have:   Pain on your right side under your rib cage  Sudden swelling in the hands and face    Remember: You know your body. If something doesn't feel right, get medical help.     For informational purposes only. Not to replace the advice of your health care provider. Copyright 2020 [facility]. All rights reserved. Clinically reviewed by Alondra Mishra, RNC-OB, MSN. [employer] [id number] - Rev 02/23.

## 2024-10-06 NOTE — PLAN OF CARE
Goal Outcome Evaluation:      Plan of Care Reviewed With: patient    Overall Patient Progress: improving    Outcome Evaluation: VSS. Pt reports tolerable pain control with current pain management medications.    VSS. Hgb: 9.0. Pt denied dizziness. Fundus firm. Pt denied pain until 1128 when the pt reported uterine cramping, prn ibuprofen administered and pt reported that she no longer had any pain after ibuprofen. PRN norco was offered but pt declined. Ambulating independently in the room. Breastfeeding and supplementing with formula per pt preference. Good latch observed. Pt was educated on importance of feeding Q 2-3 hours and waking the infant to feed and pt verbalized understanding. RN continued to remind the pt to feed the infant throughout the shift. Pt was educated on importance of attempting to breastfeed and pump for milk supply if the pt desires to breastfeed, however the pt declined use of hospital pump when offered. Lactation consultant saw the pt this shift and sold the pt a breast pump per pt request. All discharge medications were given to the pt in sealed bag including hydroxyzine, metoclopramide, ibuprofen, and oxycodone.

## 2024-10-06 NOTE — DISCHARGE SUMMARY
Maternal Discharge Summary  [facility]  Date of Service: 10/6/2024    Name      Cynthia Abdul         1985  MRN       [id number]  PCP        Taisha Avery at [facility], [phone].    ________________________________________________________________________    Assessment:  Cynthia Abdul is a 39 year old now  s/p vaginal, spontaneous  at 41w6d on 10/4/24.    Was induced for postdates and unstable fetal lie.    She reports history of poor breastmilk supply issues and request medication in this regard.  I have  encouraged her to breast-feed frequently to increase breastmilk supply, will use breast pump, but also did write for short course of metoclopramide which she may or may not find helpful.      She had a concern of perineal lacerations but on exam just has some abrasions would have not be amendable to suture repair today nor with they have been at the time of delivery.  Reassured that these will heal very well.    she requests medication for insomnia and Vistaril here was helpful.  Gave limited supply of Vistaril to take home.    she has had some cramping off-and-on and requested Norco specifically.  Did write for 10 tablets of this, but encouraged her to instead use ibuprofen first and just use Norco if needed for breakthrough pain    Acute blood loss anemia due to postpartum  hemorrhage (irais, QBL 1583).  Had hgb drop 12.4->11.7->9.8->9.  Has remaining hemodynamically stable throughout.  Will discharge on oral iron.    Discharge Plan:   Discharge to Home. Condition at Discharge:  stable  Physical activity: Regular.  Diet:  Regular.  Home care nurse: ordered for 2-3 days from now.  Lactation clinic appointment:  not ordered at this time, but could consider .  Contraception plan:  will address in clinic .  Follow up with Dr. Taisha Cadena  in 2 weeks and 6 weeks for routine postpartum care.  No future appointments.         Review of your medicines        START taking        Dose / Directions   hydrOXYzine HCl 25 MG tablet  Commonly known as: ATARAX  Used for: Primary insomnia      Dose: 25 mg  Take 1 tablet (25 mg) by mouth at bedtime as needed, may repeat once for other (adjuvant pain).  Quantity: 20 tablet  Refills: 1     ibuprofen 600 MG tablet  Commonly known as: ADVIL/MOTRIN      Dose: 600 mg  Take 1 tablet (600 mg) by mouth every 6 hours as needed for moderate pain.  Quantity: 30 tablet  Refills: 0     metoclopramide 5 MG tablet  Commonly known as: REGLAN      Take one tablet twice daily for seven days to increase breastmilk production.  Quantity: 14 tablet  Refills: 0     oxyCODONE 5 MG tablet  Commonly known as: ROXICODONE      Dose: 5 mg  Take 1 tablet (5 mg) by mouth every 4 hours as needed for breakthrough pain.  Quantity: 10 tablet  Refills: 0            CONTINUE these medicines which may have CHANGED, or have new prescriptions. If we are uncertain of the size of tablets/capsules you have at home, strength may be listed as something that might have changed.        Dose / Directions   * ferrous sulfate 325 (65 Fe) MG tablet  Commonly known as: FEROSUL  This may have changed: Another medication with the same name was added. Make sure you understand how and when to take each.  Used for: Anemia, unspecified type      Dose: 325 mg  Take 1 tablet (325 mg) by mouth daily (with breakfast)  Quantity: 90 tablet  Refills: 2     * ferrous sulfate 325 (65 Fe) MG tablet  Commonly known as: FEROSUL  This may have changed: You were already taking a medication with the same name, and this prescription was added. Make sure you understand how and when to take each.      Dose: 325 mg  Take 1 tablet (325 mg) by mouth daily. Take with and orange, small glass of orange juice, or a Vitamin C tablet.  Quantity: 90 tablet  Refills: 0           * This list has 2 medication(s) that are the same as other medications prescribed for you. Read the  directions carefully, and ask your doctor or other care provider to review them with you.                CONTINUE these medicines which have NOT CHANGED        Dose / Directions   cholecalciferol 125 mcg (5000 units) capsule  Commonly known as: VITAMIN D3      Take by mouth daily  Refills: 0     prenatal multivitamin w/iron 27-0.8 MG tablet  Used for: Prenatal care, subsequent pregnancy, unspecified trimester, Supervision of high-risk pregnancy of elderly primigravida      Dose: 1 tablet  Take 1 tablet by mouth daily  Quantity: 90 tablet  Refills: 3            STOP taking      calcium carbonate 500 MG chewable tablet  Commonly known as: TUMS        doxylamine 25 MG Tabs tablet  Commonly known as: UNISOM        famotidine 40 MG tablet  Commonly known as: PEPCID        melatonin 3 MG tablet        pyridOXINE 25 MG tablet  Commonly known as: VITAMIN B6                  Where to get your medicines        These medications were sent to [facility] [address] [address]      Phone: [phone]   ferrous sulfate 325 (65 Fe) MG tablet  hydrOXYzine HCl 25 MG tablet  ibuprofen 600 MG tablet  metoclopramide 5 MG tablet  oxyCODONE 5 MG tablet        ________________________________________________________________________    Admission Date:  10/3/2024  Discharge Date:  10/6/2024  Delivery Date:  10/4/2024  Gestational Age at Delivery: 41w6d    Principal Diagnosis: Labor and delivery  Delivery type: Vaginal, Spontaneous     Principal Problem:     (normal spontaneous vaginal delivery)  Active Problems:    Group B Streptococcus carrier, +RV culture, currently pregnant    Other immediate postpartum hemorrhage    Lactating mother    Anemia due to blood loss, acute      Subjective:  Patient denies headache, dizziness, dyspnea, and leg pain. Ambulating and eating.    Concerns:  Requests medication for sleep.  She had gotten hydroxyzine  during her induction that she she found helpful and then got 25 mg last night that was also helpful.   she tends to be very light sleeper and had had trouble getting sleep before.  She has help at home.  Here is worried that she might have perineal lacerations that were not detected at the time of delivery.  Noticed some pain of both above and below the vaginal area.  She requests to be sutured if possible.     she has a history of poor breastmilk supply and requests medication to help with this.  Is a little fuzzy as to whether she is gotten something in the past.  Genitourinary: There is    Discharge Exam:  Patient Vitals for the past 24 hrs:   BP Temp Temp src Pulse Resp SpO2   10/06/24 0739 94/58 98.2  F (36.8  C) Oral 75 18 --   10/05/24 2345 100/65 97.6  F (36.4  C) Oral 84 18 99 %   10/05/24 2048 98/59 97.6  F (36.4  C) Oral 86 18 99 %   10/05/24 1646 (!) 86/59 97.7  F (36.5  C) Oral 82 18 99 %   10/05/24 1327 96/60 98.2  F (36.8  C) Oral 86 18 96 %     General - alert, comfortable  Split skin at the top of the introitus and abrasions on the inferior introitus that are hemostatic with no gaping at all.  Extremities - trace edema  Admission on 10/03/2024   Component Date Value Ref Range Status    Hemoglobin 10/03/2024 12.4  11.7 - 15.7 g/dL Final    Treponema Antibody Total 10/03/2024 Nonreactive  Nonreactive Final    ABO/RH(D) 10/03/2024 A POS   Final    Antibody Screen 10/03/2024 Negative  Negative Final    SPECIMEN EXPIRATION DATE 10/03/2024 [id number]   Final    WBC Count 10/03/2024 8.9  4.0 - 11.0 10e3/uL Final    RBC Count 10/03/2024 4.20  3.80 - 5.20 10e6/uL Final    Hemoglobin 10/03/2024 12.4  11.7 - 15.7 g/dL Final    Hematocrit 10/03/2024 38.2  35.0 - 47.0 % Final    MCV 10/03/2024 90  78 - 100 fL Final    MCH 10/03/2024 29.5  26.5 - 33.0 pg Final    MCHC 10/03/2024 32.7  31.5 - 36.5 g/dL Final    RDW 10/03/2024 15.5 (H)  10.0 - 15.0 % Final    Platelet Count 10/03/2024 183  150 - 450 10e3/uL  Final    Hemoglobin 10/04/2024 11.7  11.7 - 15.7 g/dL Final    Platelet Count 10/04/2024 167  150 - 450 10e3/uL Final    INR 10/04/2024 1.06  0.85 - 1.15 Final    aPTT 10/04/2024 29  22 - 38 Seconds Final    Fibrinogen Activity 10/04/2024 490  170 - 510 mg/dL Final    Effective 7/30/2024, the reference range for this assay has changed. There may be differences in the flagging of prior results with similar values performed with this method.    D-Dimer Quantitative 10/04/2024 2.63 (H)  0.00 - 0.50 ug/mL FEU Final    WBC Count 10/05/2024 10.4  4.0 - 11.0 10e3/uL Final    RBC Count 10/05/2024 3.31 (L)  3.80 - 5.20 10e6/uL Final    Hemoglobin 10/05/2024 9.8 (L)  11.7 - 15.7 g/dL Final    Hematocrit 10/05/2024 29.8 (L)  35.0 - 47.0 % Final    MCV 10/05/2024 90  78 - 100 fL Final    MCH 10/05/2024 29.6  26.5 - 33.0 pg Final    MCHC 10/05/2024 32.9  31.5 - 36.5 g/dL Final    RDW 10/05/2024 15.3 (H)  10.0 - 15.0 % Final    Platelet Count 10/05/2024 155  150 - 450 10e3/uL Final    Hemoglobin 10/06/2024 9.0 (L)  11.7 - 15.7 g/dL Final      Discharge Medications:   See medication reconciliation.     Completed by:   Taisha Cadena MD  [facility]  10/6/2024 12:34 PM   used: Not needed.

## 2024-10-06 NOTE — PLAN OF CARE
Problem: Adult Inpatient Plan of Care  Goal: Optimal Comfort and Wellbeing  Outcome: Progressing  Intervention: Provide Person-Centered Care  Recent Flowsheet Documentation  Taken 10/6/2024 0000 by Ellen Pedersen RN  Trust Relationship/Rapport:   care explained   choices provided   questions encouraged   questions answered   thoughts/feelings acknowledged   empathic listening provided     Problem: Labor  Goal: Acceptable Pain Control  Intervention: Support Labor Pain Coping and Management  Recent Flowsheet Documentation  Taken 10/6/2024 0000 by Ellen Pedersen RN  Sensory Stimulation Regulation:   care clustered   lighting decreased   quiet environment promoted     Problem: Labor  Goal: Normal Uterine Contraction Pattern  Intervention: Monitor and Manage Uterine Activity  Recent Flowsheet Documentation  Taken 10/6/2024 0000 by Ellen Pedersen RN  Fluid/Electrolyte Management: fluids provided     Problem: Postpartum (Vaginal Delivery)  Goal: Successful Parent Role Transition  Outcome: Progressing  Intervention: Support Parent Role Transition  Recent Flowsheet Documentation  Taken 10/6/2024 0000 by Ellen Pedersen RN  Supportive Measures:   active listening utilized   decision-making supported   goal-setting facilitated   guided imagery facilitated   positive reinforcement provided   problem-solving facilitated   relaxation techniques promoted   self-care encouraged   verbalization of feelings encouraged  Parent-Child Attachment Promotion:   caring behavior modeled   face-to-face positioning promoted   cue recognition promoted   interaction encouraged   parent/caregiver presence encouraged   participation in care promoted   positive reinforcement provided   strengths emphasized     Problem: Postpartum (Vaginal Delivery)  Goal: Optimal Pain Control and Function  Outcome: Progressing  Intervention: Prevent or Manage Pain  Recent Flowsheet Documentation  Taken 10/6/2024 0000 by Ellen Pedersen RN  Perineal  Care:   perineal hygiene encouraged   perineal spray bottle/warm water use encouraged   Goal Outcome Evaluation:      Plan of Care Reviewed With: patient, grandparent    Overall Patient Progress: improvingOverall Patient Progress: improving    Outcome Evaluation: Franko;s vitals and Post-partum assessment are Within normal limit. Having multip cramps taking prn meds . received vistaril 25 mg for sleep. grandma at bedside giving support. Mom meeting discharge goals.   Ellen Pedersen RN

## 2024-10-23 ENCOUNTER — PRENATAL OFFICE VISIT (OUTPATIENT)
Dept: FAMILY MEDICINE | Facility: CLINIC | Age: 39
End: 2024-10-23
Payer: COMMERCIAL

## 2024-10-23 VITALS
WEIGHT: 190 LBS | DIASTOLIC BLOOD PRESSURE: 76 MMHG | HEART RATE: 68 BPM | SYSTOLIC BLOOD PRESSURE: 112 MMHG | TEMPERATURE: 97.9 F | OXYGEN SATURATION: 98 % | HEIGHT: 68 IN | BODY MASS INDEX: 28.79 KG/M2 | RESPIRATION RATE: 20 BRPM

## 2024-10-23 DIAGNOSIS — Z11.59 NEED FOR HEPATITIS B SCREENING TEST: ICD-10-CM

## 2024-10-23 DIAGNOSIS — E56.9 VITAMIN DEFICIENCY: ICD-10-CM

## 2024-10-23 DIAGNOSIS — D62 ANEMIA DUE TO BLOOD LOSS, ACUTE: ICD-10-CM

## 2024-10-23 DIAGNOSIS — Z78.9 UNKNOWN VARICELLA VACCINATION STATUS: ICD-10-CM

## 2024-10-23 DIAGNOSIS — F51.01 PRIMARY INSOMNIA: ICD-10-CM

## 2024-10-23 PROBLEM — O99.820 GROUP B STREPTOCOCCUS CARRIER, +RV CULTURE, CURRENTLY PREGNANT: Status: RESOLVED | Noted: 2024-09-04 | Resolved: 2024-10-23

## 2024-10-23 LAB
ERYTHROCYTE [DISTWIDTH] IN BLOOD BY AUTOMATED COUNT: 14 % (ref 10–15)
HCT VFR BLD AUTO: 35.7 % (ref 35–47)
HGB BLD-MCNC: 11.7 G/DL (ref 11.7–15.7)
MCH RBC QN AUTO: 29.8 PG (ref 26.5–33)
MCHC RBC AUTO-ENTMCNC: 32.8 G/DL (ref 31.5–36.5)
MCV RBC AUTO: 91 FL (ref 78–100)
PLATELET # BLD AUTO: 248 10E3/UL (ref 150–450)
RBC # BLD AUTO: 3.92 10E6/UL (ref 3.8–5.2)
WBC # BLD AUTO: 4.5 10E3/UL (ref 4–11)

## 2024-10-23 PROCEDURE — 83735 ASSAY OF MAGNESIUM: CPT | Performed by: FAMILY MEDICINE

## 2024-10-23 PROCEDURE — 86704 HEP B CORE ANTIBODY TOTAL: CPT | Performed by: FAMILY MEDICINE

## 2024-10-23 PROCEDURE — 86706 HEP B SURFACE ANTIBODY: CPT | Performed by: FAMILY MEDICINE

## 2024-10-23 PROCEDURE — 86787 VARICELLA-ZOSTER ANTIBODY: CPT | Performed by: FAMILY MEDICINE

## 2024-10-23 PROCEDURE — 36415 COLL VENOUS BLD VENIPUNCTURE: CPT | Performed by: FAMILY MEDICINE

## 2024-10-23 PROCEDURE — 85027 COMPLETE CBC AUTOMATED: CPT | Performed by: FAMILY MEDICINE

## 2024-10-23 PROCEDURE — 99213 OFFICE O/P EST LOW 20 MIN: CPT | Mod: 24 | Performed by: FAMILY MEDICINE

## 2024-10-23 PROCEDURE — 87340 HEPATITIS B SURFACE AG IA: CPT | Performed by: FAMILY MEDICINE

## 2024-10-23 PROCEDURE — 99000 SPECIMEN HANDLING OFFICE-LAB: CPT | Performed by: FAMILY MEDICINE

## 2024-10-23 PROCEDURE — 82306 VITAMIN D 25 HYDROXY: CPT | Performed by: FAMILY MEDICINE

## 2024-10-23 PROCEDURE — 80053 COMPREHEN METABOLIC PANEL: CPT | Performed by: FAMILY MEDICINE

## 2024-10-23 PROCEDURE — 84630 ASSAY OF ZINC: CPT | Mod: 90 | Performed by: FAMILY MEDICINE

## 2024-10-23 PROCEDURE — 82607 VITAMIN B-12: CPT | Performed by: FAMILY MEDICINE

## 2024-10-23 PROCEDURE — 82728 ASSAY OF FERRITIN: CPT | Performed by: FAMILY MEDICINE

## 2024-10-23 RX ORDER — METOCLOPRAMIDE 10 MG/1
TABLET ORAL
Qty: 60 TABLET | Refills: 5 | Status: SHIPPED | OUTPATIENT
Start: 2024-10-23

## 2024-10-23 RX ORDER — HYDROXYZINE HYDROCHLORIDE 50 MG/1
50 TABLET, FILM COATED ORAL
Qty: 30 TABLET | Refills: 3 | Status: SHIPPED | OUTPATIENT
Start: 2024-10-23

## 2024-10-23 ASSESSMENT — EDINBURGH POSTNATAL DEPRESSION SCALE (EPDS)
I HAVE FELT SCARED OR PANICKY FOR NO GOOD REASON: NO, NOT AT ALL
THINGS HAVE BEEN GETTING ON TOP OF ME: NO, I HAVE BEEN COPING AS WELL AS EVER
I HAVE BEEN ANXIOUS OR WORRIED FOR NO GOOD REASON: NO, NOT AT ALL
TOTAL SCORE: 0
I HAVE BLAMED MYSELF UNNECESSARILY WHEN THINGS WENT WRONG: NO, NEVER
I HAVE FELT SAD OR MISERABLE: NO, NOT AT ALL
I HAVE BEEN ABLE TO LAUGH AND SEE THE FUNNY SIDE OF THINGS: AS MUCH AS I ALWAYS COULD
THE THOUGHT OF HARMING MYSELF HAS OCCURRED TO ME: NEVER
I HAVE BEEN SO UNHAPPY THAT I HAVE BEEN CRYING: NO, NEVER
I HAVE BEEN SO UNHAPPY THAT I HAVE HAD DIFFICULTY SLEEPING: NOT AT ALL
I HAVE LOOKED FORWARD WITH ENJOYMENT TO THINGS: AS MUCH AS I EVER DID

## 2024-10-23 NOTE — PROGRESS NOTES
\2 Week Postpartum Check  [facility]  Date of Service: 10/23/2024      Assessment/Plan:     Cynthia was seen today for post partum exam.    Diagnoses and all orders for this visit:    Routine postpartum follow-up  Anemia:  Hemoglobin checked today  Breastfeeding/Bottle feeding:  Pt is breast and formula feeding baby  Contraception:   Declines for now  Depression:   Denies  Exercise:   Encouraged increasing exercise based on how she is feeling.  Healthcare maintenance:   Up to date  Immunizations:    Immunizations needed; declines today    -     CBC with platelets; Future  -     Ferritin; Future    Anemia due to blood loss, acute  Patient had postpartum hemorrhage and so checking labs today as above    Need for hepatitis B screening test  -     Hepatitis B surface antigen; Future  -     Hepatitis B core antibody; Future  -     Hepatitis B Surface Antibody; Future    Lactating mother  -     metoclopramide (REGLAN) 10 MG tablet; Take one tablet twice daily for seven days to increase breastmilk production.    Primary insomnia  -     hydrOXYzine HCl (ATARAX) 50 MG tablet; Take 1 tablet (50 mg) by mouth at bedtime as needed, may repeat once for other (adjuvant pain).    Unknown varicella vaccination status  -     Varicella Zoster Virus Antibody IgG; Future    Vitamin deficiency  -     Vitamin B12; Future  -     Zinc; Future  -     Magnesium; Future  -     Vitamin D Deficiency; Future  -     Comprehensive metabolic panel (BMP + Alb, Alk Phos, ALT, AST, Total. Bili, TP); Future              Follow-up: In about 2 months        Subjective:      Cynthia Abdul is a 39 year old female who presents for a postpartum visit.     Doing well.  Requests medications for sleep and to help with breastmilk production  She requests lab workup for saggy skin that she feels like a fluid vitamin deficiency    She is 2 weeks postpartum following a .  I have fully reviewed the prenatal and intrapartum course.   Delivery  notes below  Postpartum course has been Unremarkable.  Baby's course has been Uncomplicated.  Periods:  None Patient's last menstrual period was 2023.   Bowel or bladder concerns: none  Patient has not resumed intercourse.          [place] Depression Scale  Thoughts of Harming Self: (Patient-Rptd) Never  Total Score: (Patient-Rptd) 0       Recent hemoglobin results    Hemoglobin   Date Value Ref Range Status   10/23/2024 11.7 11.7 - 15.7 g/dL Final   10/06/2024 9.0 (L) 11.7 - 15.7 g/dL Final   10/05/2024 9.8 (L) 11.7 - 15.7 g/dL Final   10/04/2024 11.7 11.7 - 15.7 g/dL Final         The following portions of the patient's history were reviewed and updated as appropriate: allergies, current medications and problem list     OB History    Para Term  AB Living   8 8 8 0 0 8   SAB IAB Ectopic Multiple Live Births   0 0 0 0 8      # Outcome Date GA Lbr Librado/2nd Weight Sex Type Anes PTL Lv   8 Term 10/04/24 41w6d 02:58 3.5 kg (7 lb 11.5 oz) F Vag-Spont None N GIO      Birth Comments: Induce for postdates and unstable fetal lie.  Baby had been very high station until right before delivery, delivered immedialy after SROM.  Had postpartum hemorrhage due to atony with QBL 1583cc.      Complications: Postpartum hemorrhage      Name: Oma KWONG Georgette      Apgar1: 9  Apgar5: 9   7 Term 10/31/21 40w0d 06:00 / 00:07 3.1 kg (6 lb 13.4 oz) F Vag-Spont None N GIO      Name: Adina Georgette      Apgar1: 8  Apgar5: 9   6 Term 20 41w0d 07:28 / 00:07 3.38 kg (7 lb 7.2 oz) M Vag-Spont None N GIO      Name: TRUE ROA-ELIEZER      Apgar1: 8  Apgar5: 9   5 Term 2015 40w0d    Vag-Spont   GIO   4 Term 2005 40w0d    Vag-Spont   GIO   3 Term     M    GIO      Name: Georgette - 9   2 Term  40w0d    Vag-Spont   GIO   1 Term     M Vag-Spont   GIO      Name: Isreal Miguel - 18     Information for the patient's :  Oma Palomino [[id number]]   Oma Palomino    Objective:        Wt Readings from Last 3 Encounters:   10/23/24  "86.2 kg (190 lb)   10/06/24 86.8 kg (191 lb 4.8 oz)   10/01/24 89.4 kg (197 lb 1.9 oz)     /76   Pulse 68   Temp 97.9  F (36.6  C) (Oral)   Resp 20   Ht 1.727 m (5' 8\")   Wt 86.2 kg (190 lb)   LMP 12/24/2023   SpO2 98%   Breastfeeding Yes   BMI 28.89 kg/m          Physical Exam:  General Appearance: Alert, cooperative, no distress, appears stated age         "

## 2024-10-23 NOTE — LETTER
"November 4, 2024      Cynthia Abdul  [address] [facility] [address] [place] [address]        Dear MsJaz,    We are writing to inform you of your tests from 10/23/24.    Your vitamins and minerals were all normal except for low iron level (as shown by low \"ferritin\").  I recommend taking an iron supplement or prenatal vitamin.  You are immune to Chicken Pox/Varicella, so you don't need vaccination against that.  You are NOT immune to Hepatitis B (a virus that affects the liver), so you could consider the 3-shot vaccine series for that in your future.  Kidney and liver function were normal.  Normal blood sugar/no diabetes.    Resulted Orders   CBC with platelets   Result Value Ref Range    WBC Count 4.5 4.0 - 11.0 10e3/uL    RBC Count 3.92 3.80 - 5.20 10e6/uL    Hemoglobin 11.7 11.7 - 15.7 g/dL    Hematocrit 35.7 35.0 - 47.0 %    MCV 91 78 - 100 fL    MCH 29.8 26.5 - 33.0 pg    MCHC 32.8 31.5 - 36.5 g/dL    RDW 14.0 10.0 - 15.0 %    Platelet Count 248 150 - 450 10e3/uL   Ferritin   Result Value Ref Range    Ferritin 15 6 - 175 ng/mL   Hepatitis B surface antigen   Result Value Ref Range    Hepatitis B Surface Antigen Nonreactive Nonreactive   Hepatitis B core antibody   Result Value Ref Range    Hepatitis B Core Antibody Total Nonreactive Nonreactive      Comment:      Nonreactive hepatitis B core antibody test results indicate the absence of exposure to hepatitis B virus and no evidence of recent, past/resolved, or chronic hepatitis B.    Hepatitis B Surface Antibody   Result Value Ref Range    Hepatitis B Surface Antibody Nonreactive       Comment:      Nonreactive results, defined as anti-HBs levels of less than 8.5 mIU/mL, indicate a lack of recovery from acute or chronic hepatitis B or inadequate immune response to HBV vaccination.    Hepatitis B Surface Antibody Instrument Value <3.50 <8.5 m[IU]/mL   Varicella Zoster Virus Antibody IgG   Result Value Ref Range    Varicella Zoster Virus Antibody IgG Interpretation " Positive     Varicella Zoster Virus Antibody IgG Instrument Value 6.37 <1.00 S/CO    Narrative    New method and reference range in effect on 10/08/2024  Suggests previous exposure or immunization and probable immunity.   Vitamin B12   Result Value Ref Range    Vitamin B12 561 232 - 1,245 pg/mL   Zinc   Result Value Ref Range    Zinc, Serum/Plasma 63.8 60.0 - 120.0 ug/dL      Comment:      INTERPRETIVE INFORMATION: Zinc, Serum or Plasma    Elevated results may be due to skin or collection-related   contamination, including the use of a noncertified   metal-free collection/transport tube. If contamination   concerns exist due to elevated levels of serum/plasma zinc,   confirmation with a second specimen collected in a   certified metal-free tube is recommended.    Circulating zinc concentrations are dependent on albumin   status and are depressed with malnutrition.  Zinc may also   be lowered with infection, inflammation, stress, oral   contraceptives, and pregnancy.  Zinc may be elevated with   zinc supplementation or fasting.  Elevated zinc   concentrations may interfere with copper absorption.     This test was developed and its performance characteristics   determined by [facility]. It has not been cleared or   approved by the US Food and Drug Administration. This test   was performed in a CLIA certified laboratory and is   intended for clinical  purposes.  Performed By: [facility]  [address] [place] [id number]  : Michael Gonzales MD, PhD  CLIA Number: [id number]   Magnesium   Result Value Ref Range    Magnesium 1.7 1.7 - 2.3 mg/dL   Vitamin D Deficiency   Result Value Ref Range    Vitamin D, Total (25-Hydroxy) 33 20 - 50 ng/mL      Comment:      optimum levels    Narrative    Season, race, dietary intake, and treatment affect the concentration of 25-hydroxy-Vitamin D. Values may decrease during winter months and increase during summer months.    Vitamin D  determination is routinely performed by an immunoassay specific for 25 hydroxyvitamin D3.  If an individual is on vitamin D2(ergocalciferol) supplementation, please specify 25 OH vitamin D2 and D3 level determination by LCMSMS test VITD23.     Comprehensive metabolic panel (BMP + Alb, Alk Phos, ALT, AST, Total. Bili, TP)   Result Value Ref Range    Sodium 140 135 - 145 mmol/L    Potassium 4.3 3.4 - 5.3 mmol/L    Carbon Dioxide (CO2) 24 22 - 29 mmol/L    Anion Gap 9 7 - 15 mmol/L    Urea Nitrogen 15.7 6.0 - 20.0 mg/dL    Creatinine 0.67 0.51 - 0.95 mg/dL    GFR Estimate >90 >60 mL/min/1.73m2      Comment:      eGFR calculated using 2021 CKD-EPI equation.    Calcium 8.8 8.8 - 10.4 mg/dL      Comment:      Reference intervals for this test were updated on 7/16/2024 to reflect our healthy population more accurately. There may be differences in the flagging of prior results with similar values performed with this method. Those prior results can be interpreted in the context of the updated reference intervals.    Chloride 107 98 - 107 mmol/L    Glucose 94 70 - 99 mg/dL    Alkaline Phosphatase 100 40 - 150 U/L    AST 21 0 - 45 U/L    ALT 15 0 - 50 U/L    Protein Total 6.6 6.4 - 8.3 g/dL    Albumin 3.5 3.5 - 5.2 g/dL    Bilirubin Total <0.2 <=1.2 mg/dL       If you have any questions or concerns, please call the clinic at the number listed above.       Sincerely,      Taisha Cadena MD

## 2024-10-23 NOTE — PATIENT INSTRUCTIONS
HEALTH MATTERS  Your Six-week Post-partum Check-up  A Health Care Guide for New Mothers  (from Association of [employer]ession[employer] www.arhp.org/healthmatters)    You have spent nine months preparing for your baby s birth. You ve probably read every book, article, and Web site to make sure you re eating right, exercising at the appropriate level, and taking the necessary vitamins and supplements. Your effort has paid off CONGRATULATIONS.    After giving birth, it s important for you to keep up the healthy habits you practiced while you were pregnant. Your health care provider is your best resource for making sure you re on track.     DIET, NUTRITION, & EXERCISE GOALS  Weight Loss  Returning to pre-pregnancy  weight is a common goal.  Combining a healthy diet with  exercise will help you lose  weight safely after delivery.  Because dieting after pregnancy  can decrease bone mineral  density, it s important to get  enough calcium and do  weightbearing activities.   GOAL: Lose weight gradually-- 4.5 lbs/month maximum after first month post-delivery (unless you had a high prepregnancy weight):  Be patient  Consume at least 1,800 calories per day (an additional 500 calories per day is recommended if you re breastfeeding).  Drink plenty of fluids (moderate caffeine intake, such as 1 cup of coffee per day, and occasional alcohol consumption are okay).   Nutrition  A well-balanced diet is essential  for women before, during, and  after pregnancy. Most  multivitamins and prenatal  vitamins don t supply enough  calcium. Also, breastfeeding  mothers transfer 250-350 mg of  calcium to their baby through  breast milk when they re nursing.  Vitamin and mineral supplements  can help ensure that you get the  nutrients you need.   GOAL: 1,000 mg of calcium daily for adult women  (1,300 mg for adolescents):  Eat foods such as low-fat and fat-free dairy products and leafy vegetables (e.g., broccoli, kale, and  kaur).  If food choices don t supply the recommended calcium, take a calcium supplement (e.g., Calcium Soft Chews, Caltrate , Oc-Juan José , Tums , or Viactiv  with meals. Note: 400-800 IU of vitamin D helps your body absorb calcium).  GOAL: daily: 15 mg of iron daily  Eat foods such as fortified cereals, lean beef, dried fruits, tofu, oysters, and spinach.  If the time between pregnancies is short, talk to your health care provider to see if you should take an iron supplement as well.     Exercise  Regular physical activity after  delivery should be a part of every  new mother s daily life. A gradual  return to exercise is recommended.  Some women may be able to  start exercising within days of  delivery; others may need to wait  four to six weeks. Talk to your  health care provider about what  exercise schedule and level are  right for you. GOAL: Strengthen the pelvic floor and abdominal muscles; reduce the risk of urinary stress incontinence (urine leakage):  Do Kegel exercises: Contract the pelvic muscles for 10 seconds and then relax them for 10 seconds. Do this for 15 minutes, four times/day.  GOAL: Keep bones strong; tone and shape your body:  Do weight-bearing exercises, (e.g., walking or cycyling), which help maintain strong, dense bones.  If you re nursing, breastfeed before exercising to minimize breast discomfort.   Physical Exam  Don t be embarrassed to discuss  with your health care provider  all aspects of your physical  health including important  conditions that may result from  Delivery. GOAL: Thorough post post-delivery health exam:  Talk to your health care provider about:    - Breast condition and breastfeeding    - Constipation    - Hemorrhoids    - Vaginal discharge    - Urinary incontinence (leakage)    - Healing below the birth canal    - Varicose veins    - Weight loss    - Exercise   Emotional Adjustment  Many women have emotional  changes after delivery. Let your  health care provider know  if  you ve been feeling  overwhelmed, anxious, sad,  isolated, nervous, obsessive,  incompetent, exhausted, or you  can t sleep. Your health care  provider can help you feel and  cope better.   GOAL: Good health and well well-being:  Take time for yourself.  Get enough rest.  Call on family and friends for help.  Consider joining a mothers  or postpartum support group  Call [employer] at 167-475-6DKQ or visit online at www.postpartum.net.  Delay going back to work for at least 6 weeks after delivery.  Ask your health care provider about:    - Mood swings and  baby blues     - Symptoms of postpartum depression    - Ways to prevent depression    - Planning for hormonal shifts (e.g., when you re weaning your baby or your period starts again)     Sexuality and Contraception  Lack of interest in sex is common  after childbirth and for the first  couple of months afterwards.  Most women experience a  gradual return to pre-pregnancy  levels of sexual desire,  enjoyment, and frequency within  a year of giving birth, but every  woman has her own timetable.  The return to fertility is  unpredictable. You may be able  to get pregnant before your  periods return, even when you re  breastfeeding. For most women  who aren t nursing, ovulation  occurs about 45 days  postpartum, but it can be earlier.  Discuss family planning with your  health care provider.   PHYSICAL, EMOTIONAL AND  SEXUAL NEEDS GOALS  GOAL: Healthy sexuality:   Keep an open dialogue with your partner about your readiness to make love.   Make time for cuddling and kissing to re-establish physical closeness.   Ask your health care provider about:    - When to resume sexual intercourse    - How to minimize discomfort    - Effects of breastfeeding or hormones on sexual desire  GOAL: Post-delivery contraception:   Think about whether or not you d like to have more children.   Before you resume sexual activity, ask your health care provider  about:    - Contraceptive options during breastfeeding and afterward    - The benefits and risks of all suitable methods

## 2024-10-24 LAB
ALBUMIN SERPL BCG-MCNC: 3.5 G/DL (ref 3.5–5.2)
ALP SERPL-CCNC: 100 U/L (ref 40–150)
ALT SERPL W P-5'-P-CCNC: 15 U/L (ref 0–50)
ANION GAP SERPL CALCULATED.3IONS-SCNC: 9 MMOL/L (ref 7–15)
AST SERPL W P-5'-P-CCNC: 21 U/L (ref 0–45)
BILIRUB SERPL-MCNC: <0.2 MG/DL
BUN SERPL-MCNC: 15.7 MG/DL (ref 6–20)
CALCIUM SERPL-MCNC: 8.8 MG/DL (ref 8.8–10.4)
CHLORIDE SERPL-SCNC: 107 MMOL/L (ref 98–107)
CREAT SERPL-MCNC: 0.67 MG/DL (ref 0.51–0.95)
EGFRCR SERPLBLD CKD-EPI 2021: >90 ML/MIN/1.73M2
FERRITIN SERPL-MCNC: 15 NG/ML (ref 6–175)
GLUCOSE SERPL-MCNC: 94 MG/DL (ref 70–99)
HBV CORE AB SERPL QL IA: NONREACTIVE
HBV SURFACE AB SERPL IA-ACNC: <3.5 M[IU]/ML
HBV SURFACE AB SERPL IA-ACNC: NONREACTIVE M[IU]/ML
HBV SURFACE AG SERPL QL IA: NONREACTIVE
HCO3 SERPL-SCNC: 24 MMOL/L (ref 22–29)
MAGNESIUM SERPL-MCNC: 1.7 MG/DL (ref 1.7–2.3)
POTASSIUM SERPL-SCNC: 4.3 MMOL/L (ref 3.4–5.3)
PROT SERPL-MCNC: 6.6 G/DL (ref 6.4–8.3)
SODIUM SERPL-SCNC: 140 MMOL/L (ref 135–145)
VIT B12 SERPL-MCNC: 561 PG/ML (ref 232–1245)
VIT D+METAB SERPL-MCNC: 33 NG/ML (ref 20–50)
VZV IGG SER QL IA: 6.37 S/CO
VZV IGG SER QL IA: POSITIVE

## 2024-10-25 LAB — ZINC SERPL-MCNC: 63.8 UG/DL

## 2024-10-28 NOTE — TELEPHONE ENCOUNTER
Called and relayed message to pt and pt was understanding - will  medication.  Will schedule follow up as needed  
Your urine test was positive and culture for group B strep, given the fact that you have some flank pain and are still of childbearing age, it would be important to treat this infection.  Amoxicillin was sent to your [facility] pharmacy, at follow-up we will recheck your urine to see that you clear this infection.  
no

## 2025-04-16 ENCOUNTER — MEDICAL CORRESPONDENCE (OUTPATIENT)
Dept: HEALTH INFORMATION MANAGEMENT | Facility: CLINIC | Age: 40
End: 2025-04-16
Payer: COMMERCIAL

## 2025-04-16 DIAGNOSIS — J06.9 VIRAL UPPER RESPIRATORY TRACT INFECTION: ICD-10-CM

## 2025-04-17 RX ORDER — LORATADINE 10 MG/1
1 TABLET ORAL DAILY
Qty: 30 TABLET | Refills: 0 | Status: SHIPPED | OUTPATIENT
Start: 2025-04-17

## 2025-04-23 ENCOUNTER — TELEPHONE (OUTPATIENT)
Dept: FAMILY MEDICINE | Facility: CLINIC | Age: 40
End: 2025-04-23
Payer: COMMERCIAL

## 2025-04-23 NOTE — TELEPHONE ENCOUNTER
Dr. Cadena: I don't see this on med list.  If it also makes no sense to you that this particular med would be requested, suspect pt wanted refill of metoclopramide and nursing staff can reach out to confirm.    BULL NoelN, PHN, RN-[facility]  [phone]

## 2025-04-23 NOTE — TELEPHONE ENCOUNTER
Medication Question or Refill    Contacts       Contact Date/Time Type Contact Phone/Fax    04/23/2025 01:20 PM CDT Phone (Incoming) Cynthia Abdul (Self) [phone] (M)            What medication are you calling about (include dose and sig)?: misoprostol (CYTOTEC)     Preferred Pharmacy:    [facility] [id number] [place] [facility] - [place], [place] - [address]  [address] [place] [address]  Phone: [phone] Fax: [phone]    Controlled Substance Agreement on file:   CSA -- Patient Level:    CSA: None found at the patient level.       Who prescribed the medication?: PCP    Do you need a refill? Yes    When did you use the medication last?     Patient offered an appointment? No    Do you have any questions or concerns?  Yes: Pt asking for a refill. Pt asking if Covering provider can refill today.    Okay to leave a detailed message?: Yes at Home number on file [phone] (home)

## 2025-04-24 NOTE — TELEPHONE ENCOUNTER
Patient called inquiring status of medication request below.     RN attempted to gather more information regarding what patient is requesting--patient is not familiar with misoprostol and not sure why this was requested. Educated that this is used to induce labor or miscarriage/, patient declines needing this, states that is not what she wants nor requested.     She is simply requesting a standard OCP. Denies concern for current unwanted pregnancy, would just like a daily contraceptive to prevent future pregnancies.     Scheduled for VV tomorrow with available provider to discuss.     Future Appointments   Date Time Provider Department Center   2025  4:30 PM Hilary Krishnamurthy MD [facility]       Anita Bentley, RN BSN  [facility]

## 2025-04-28 NOTE — TELEPHONE ENCOUNTER
4/25/25- pt had a virtual visit for this request.  Doug, Triage RN  [facility]/ [phone]

## 2025-06-07 ENCOUNTER — HEALTH MAINTENANCE LETTER (OUTPATIENT)
Age: 40
End: 2025-06-07

## 2025-07-15 ENCOUNTER — TELEPHONE (OUTPATIENT)
Dept: FAMILY MEDICINE | Facility: CLINIC | Age: 40
End: 2025-07-15
Payer: COMMERCIAL

## 2025-07-15 DIAGNOSIS — N92.6 MISSED PERIOD: Primary | ICD-10-CM

## 2025-07-15 NOTE — TELEPHONE ENCOUNTER
Pt reports surprise pregnancy.  LMP end of April  Will check ultrasound.   Already taking PNV.  RN please call for intake within the next 2 weeks. Schedule to see me for IOB sometime after ultrasound.    Will see me for prenatal care.

## 2025-07-16 NOTE — TELEPHONE ENCOUNTER
Called patient on the phone to assist with OB intake scheduling.  Patient request this be a telephone intake.  Appt scheduled with date, time and method of appt provided.    Damion Oliveira RN   [facility]

## 2025-07-25 ENCOUNTER — HOSPITAL ENCOUNTER (OUTPATIENT)
Dept: ULTRASOUND IMAGING | Facility: HOSPITAL | Age: 40
Discharge: HOME | End: 2025-07-25
Attending: FAMILY MEDICINE | Admitting: RADIOLOGY
Payer: COMMERCIAL

## 2025-07-25 DIAGNOSIS — N92.6 MISSED PERIOD: ICD-10-CM

## 2025-07-25 PROCEDURE — 76805 OB US >/= 14 WKS SNGL FETUS: CPT

## 2025-07-29 ENCOUNTER — VIRTUAL VISIT (OUTPATIENT)
Dept: FAMILY MEDICINE | Facility: CLINIC | Age: 40
End: 2025-07-29
Payer: COMMERCIAL

## 2025-07-29 DIAGNOSIS — O21.9 NAUSEA AND VOMITING IN PREGNANCY: ICD-10-CM

## 2025-07-29 DIAGNOSIS — K59.00 CONSTIPATION DURING PREGNANCY: ICD-10-CM

## 2025-07-29 DIAGNOSIS — Z34.80 PRENATAL CARE, SUBSEQUENT PREGNANCY, UNSPECIFIED TRIMESTER: Primary | ICD-10-CM

## 2025-07-29 DIAGNOSIS — O99.619 CONSTIPATION DURING PREGNANCY: ICD-10-CM

## 2025-07-29 PROCEDURE — 99207 PR NO CHARGE NURSE ONLY: CPT | Mod: 93

## 2025-07-29 RX ORDER — AMOXICILLIN 250 MG
1 CAPSULE ORAL 2 TIMES DAILY
Qty: 60 TABLET | Refills: 6 | Status: SHIPPED | OUTPATIENT
Start: 2025-07-29

## 2025-07-29 RX ORDER — DOXYLAMINE SUCCINATE AND PYRIDOXINE HYDROCHLORIDE, DELAYED RELEASE TABLETS 10 MG/10 MG 10; 10 MG/1; MG/1
10 TABLET, DELAYED RELEASE ORAL AT BEDTIME
Qty: 20 TABLET | Refills: 2 | Status: SHIPPED | OUTPATIENT
Start: 2025-07-29

## 2025-07-29 RX ORDER — PYRIDOXINE HCL (VITAMIN B6) 25 MG
TABLET ORAL
Qty: 60 TABLET | Refills: 2 | Status: SHIPPED | OUTPATIENT
Start: 2025-07-29

## 2025-07-29 NOTE — PROGRESS NOTES
SUBJECTIVE:     HPI:    This is a 39 year old female patient,  who presents for her first virtual RN obstetrical visit.    PAULETTE: 2026, by Ultrasound.  She is 15w1d weeks.  Her cycles are irregular.  Her last menstrual period was normal.   Since her LMP, she has experienced  nausea, emesis, headache, constipation, and low appetite).   She denies abdominal pain, fatigue, vaginal discharge, dysuria, pelvic pain, urinary urgency, lightheadedness, urinary frequency, vaginal bleeding, and hemorrhoids.    Additional History:  reports eight term vaginal deliveries. She denies history of preeclampsia, GDM, no major surgeries, Period irregular, LMP 2025. PAULETTE: 2026 per recent ulstrasound done. Cynthia plans to deliver at [facility] and see Dr. Cadena for OB care.    Have you travelled during the pregnancy?No  Have your sexual partner(s) travelled during the pregnancy?No      HISTORY:   Planned Pregnancy: No, but acceptable of pregnancy  Marital Status:   Occupation: PCA  Living in Household: Spouse and Children    Past History:  Her past medical history is non-contributory.      She has a history of  postpartum hemorrhage.    Since her last LMP she denies use of alcohol, tobacco and street drugs.    Past medical, surgical, social and family history were reviewed and updated in EPIC.    Allergies as of 2025:    Allergies as of 2025    (No Known Allergies)       Current Outpatient Medications   Medication Sig Dispense Refill    Doxylamine-Pyridoxine 10-10 MG TBEC Take 10 mg by mouth at bedtime. 20 tablet 2    Prenatal Vit-Fe Fumarate-FA (PRENATAL MULTIVITAMIN W/IRON) 27-0.8 MG tablet TAKE 1 TABLET BY MOUTH EVERY DAY 90 tablet 3    pyridOXINE (VITAMIN  B-6) 25 MG tablet 1 tablet every 6-8 hours 60 tablet 2    senna-docusate (SENOKOT-S/PERICOLACE) 8.6-50 MG tablet Take 1 tablet by mouth 2 times daily. Start with 1 tablet PO BID, reduce to 1 tablet daily when having daily BMs.  Stop for loose stools. 60 tablet 6    cholecalciferol (VITAMIN D3) 125 mcg (5000 units) capsule Take by mouth daily (Patient not taking: Reported on 7/29/2025)      ferrous sulfate (FEROSUL) 325 (65 Fe) MG tablet Take 1 tablet (325 mg) by mouth daily (with breakfast) (Patient not taking: Reported on 7/29/2025) 90 tablet 2    hydrOXYzine HCl (ATARAX) 50 MG tablet Take 1 tablet (50 mg) by mouth at bedtime as needed, may repeat once for other (adjuvant pain). (Patient not taking: Reported on 7/29/2025) 30 tablet 3    levonorgestrel (PLAN B) 1.5 MG tablet Take 1 tablet (1.5 mg) by mouth once for 1 dose. (Patient not taking: Reported on 7/29/2025) 1 tablet 0    loratadine (CLARITIN) 10 MG tablet TAKE 1 TABLET (10 MG) BY MOUTH DAILY. (Patient not taking: Reported on 7/29/2025) 30 tablet 5    metoclopramide (REGLAN) 10 MG tablet Take one tablet twice daily for seven days to increase breastmilk production. (Patient not taking: Reported on 7/29/2025) 60 tablet 5    norgestimate-ethinyl estradiol (ORTHO-CYCLEN) 0.25-35 MG-MCG tablet Take 1 tablet by mouth daily. (Patient not taking: Reported on 7/29/2025) 84 tablet 3     No current facility-administered medications for this visit.       ROS:   12 point review of systems negative other than symptoms noted below or in the HPI.      OBJECTIVE:     EXAM:  LMP 04/20/2025 (Approximate)  There is no height or weight on file to calculate BMI.    This was a virtual RN OB Intake visit, so unable to obtain any vitals or patient's weight.    ASSESSMENT/PLAN:     Discussed Potential Myriad Prenatal Genetic Testing with patient, however patient declines at this time.      ICD-10-CM    1. Prenatal care, subsequent pregnancy, unspecified trimester  Z34.80       2. Nausea and vomiting in pregnancy  O21.9 pyridOXINE (VITAMIN  B-6) 25 MG tablet     Doxylamine-Pyridoxine 10-10 MG TBEC      3. Constipation during pregnancy  O99.619 senna-docusate (SENOKOT-S/PERICOLACE) 8.6-50 MG tablet    K59.00            39 year old , 15w1d weeks of pregnancy with PAULETTE of 2026, by Ultrasound    Discussed as follows:    Prenatal Education during RN OB visit Intake Today:    Medications: Prenatal Vitamin, recommend one with DHA as this helps with development of eyes and brain, no specific brand recommendation.     Water Intake:  ounces daily     Weight: monitored at each appointment     Common Symptoms: may experience shortness of breath, increased heart rate, nausea/vomiting, headaches, cramping, spotting, etc.  If symptoms not improved and or worsening, contact your OB clinician.      Only take Tylenol (acetaminophen) for headaches/pain concerns   Review remedies & OTC medication to help with common symptoms in pregnancy (see taking medication during pregnancy handout)     Bledsoe: baby is protected, not uncommon to have light cramping or spotting, reach out if persisting or worsening. Please reach out, if persisting or worsening.    Diet:  Wash fruits and vegetables before eating raw or cooking   Avoid unpasteurized products   Avoid undercooked meat, poultry, fish (no raw fish) and eggs    Reheat hot dogs and luncheon meats/cold cuts prior to consumption     Caffeine: limit to 200 mg/day (about 1.5 cups of coffee)     Please abstain from smoking, alcohol, and drugs      Travel:  No travel 4-6 weeks out from due date   Planes/lengthy drives- get up and walk every 1-2 hours for circulation, increased risk for DVT during pregnancy  Seat belts- wear underneath belly not across it   Air bags- back up seat     Disease and Infection:  Risk of toxoplasmosis with cats  feces, have someone else change litter box during pregnancy, wear gloves when working outside and wash hands thoroughly.  Avoid traveling to locations high risk for zika, use insect repellent, wear long sleeves and pants.  Recommended to receive the Flu vaccine during flu season, and COVID vaccine.  TDAP recommended with each pregnancy, make sure  your partner is up to date as well (usually only once every 10 years).     Frequency of Appointments:   Unless advised otherwise   Every 4 weeks until 28 weeks   Every 2 weeks until 36 weeks   Weekly until delivery     Counseling given:   - Follow up in 4-6 weeks for return OB visit.    Aug 12, 2025 5:00 PM  (Arrive by 4:40 PM)  OB Visit with Taisha Cadena MD  [facility] ([facility] [place]) [phone]           PLAN/PATIENT INSTRUCTIONS:    Per new Prenatal Care  Map, Prenatal Labs will be ordered & drawn after First OB Provider visit:    1) ABO/RH Type and Screen  2) CBC with Platelets  3) Hepatitis C Screening Reflex HCV Quant  4) Hemoglobin A1C  5) Lead Venous Blood  6) Urine Culture  7) UA with Micro  8) Treponema Abs W Reflex TO RPR and Titer  9) Rubella Antibody IGG  10) HIV Antigen Antibody Combo  11) Hepatitis B Surface Antigen  12) Chlamydia PCR  13) Gonorrhea PCR          Prenatal OB Questionnaire      Patient supplied answers from flow sheet for:  Prenatal OB Questionnaire.  Past Medical History  Have you ever recieved care for your mental health? : (Proxy-Rptd) No  Have you ever been in a major accident or suffered serious trauma?: (Proxy-Rptd) No  Within the last year, has anyone hit, slapped, kicked or otherwise hurt you?: (Proxy-Rptd) No  In the last year, has anyone forced you to have sex when you didn't want to?: (Proxy-Rptd) No    Past Medical History 2   Have you ever received a blood transfusion?: (Proxy-Rptd) No  Would you accept a blood transfusion if was medically recommended?: (Proxy-Rptd) Yes  Does anyone in your home smoke?: (Proxy-Rptd) No   Is your blood type Rh negative?: (Proxy-Rptd) Unknown  Have you ever ?: (!) (Proxy-Rptd) Yes  Have you been hospitalized for a nonsurgical reason excluding normal delivery?: (Proxy-Rptd) No  Have you ever had an abnormal pap smear?: (Proxy-Rptd) No    Past Medical History (Continued)  Do you have a  history of abnormalities of the uterus?: (Proxy-Rptd) No  Did your mother take MEGHAN or any other hormones when she was pregnant with you?: (Proxy-Rptd) Unknown  Do you have any other problems we have not asked about which you feel may be important to this pregnancy?: (Proxy-Rptd) No         Early ultrasound screening tool:    Does patient have irregular periods?  Yes  Did patient use hormonal birth control in the three months prior to positive urine pregnancy test? No  Is the patient breastfeeding?  No  Is the patient 10 weeks or greater at time of education visit?  Yes       BULL SaavedraN, RN, PHN   [facility]

## 2025-07-29 NOTE — Clinical Note
Rolando Cadena,  I am just routing RN OB Intake per standard workflow. I was able to get patient scheduled to see you on 8/12. Patient unable to come on 8/01 when offered availability. [place] let me know if you would like to see her sooner. Thanks!

## 2025-08-12 ENCOUNTER — DOCUMENTATION ONLY (OUTPATIENT)
Dept: FAMILY MEDICINE | Facility: CLINIC | Age: 40
End: 2025-08-12

## 2025-08-12 PROBLEM — D62 ANEMIA DUE TO BLOOD LOSS, ACUTE: Status: RESOLVED | Noted: 2024-10-05 | Resolved: 2025-08-12

## 2025-08-12 PROBLEM — O99.019 ANEMIA IN PREGNANCY: Status: RESOLVED | Noted: 2024-05-07 | Resolved: 2025-08-12

## 2025-08-12 PROBLEM — O09.899 SHORT INTERVAL BETWEEN PREGNANCIES AFFECTING PREGNANCY, ANTEPARTUM: Status: ACTIVE | Noted: 2025-08-12

## 2025-08-19 ENCOUNTER — HOSPITAL ENCOUNTER (EMERGENCY)
Facility: HOSPITAL | Age: 40
Discharge: HOME | End: 2025-08-19
Attending: FAMILY MEDICINE
Payer: COMMERCIAL

## 2025-08-19 ENCOUNTER — APPOINTMENT (OUTPATIENT)
Dept: MRI IMAGING | Facility: HOSPITAL | Age: 40
End: 2025-08-19
Attending: FAMILY MEDICINE
Payer: COMMERCIAL

## 2025-08-19 VITALS
DIASTOLIC BLOOD PRESSURE: 57 MMHG | HEIGHT: 68 IN | SYSTOLIC BLOOD PRESSURE: 100 MMHG | OXYGEN SATURATION: 98 % | TEMPERATURE: 98.1 F | HEART RATE: 82 BPM | BODY MASS INDEX: 30.46 KG/M2 | RESPIRATION RATE: 16 BRPM | WEIGHT: 201 LBS

## 2025-08-19 DIAGNOSIS — R51.9 ACUTE NONINTRACTABLE HEADACHE, UNSPECIFIED HEADACHE TYPE: Primary | ICD-10-CM

## 2025-08-19 PROCEDURE — 96375 TX/PRO/DX INJ NEW DRUG ADDON: CPT

## 2025-08-19 PROCEDURE — 258N000003 HC RX IP 258 OP 636: Performed by: FAMILY MEDICINE

## 2025-08-19 PROCEDURE — 99285 EMERGENCY DEPT VISIT HI MDM: CPT | Mod: 25 | Performed by: FAMILY MEDICINE

## 2025-08-19 PROCEDURE — 96361 HYDRATE IV INFUSION ADD-ON: CPT

## 2025-08-19 PROCEDURE — 70551 MRI BRAIN STEM W/O DYE: CPT

## 2025-08-19 PROCEDURE — 250N000011 HC RX IP 250 OP 636: Performed by: FAMILY MEDICINE

## 2025-08-19 PROCEDURE — 96374 THER/PROPH/DIAG INJ IV PUSH: CPT

## 2025-08-19 RX ORDER — DEXAMETHASONE SODIUM PHOSPHATE 10 MG/ML
10 INJECTION, SOLUTION INTRAMUSCULAR; INTRAVENOUS ONCE
Status: COMPLETED | OUTPATIENT
Start: 2025-08-19 | End: 2025-08-19

## 2025-08-19 RX ADMIN — SODIUM CHLORIDE 1000 ML: 0.9 INJECTION, SOLUTION INTRAVENOUS at 11:55

## 2025-08-19 RX ADMIN — PROCHLORPERAZINE EDISYLATE 5 MG: 5 INJECTION INTRAMUSCULAR; INTRAVENOUS at 11:55

## 2025-08-19 RX ADMIN — DEXAMETHASONE SODIUM PHOSPHATE 10 MG: 10 INJECTION, SOLUTION INTRAMUSCULAR; INTRAVENOUS at 11:55

## 2025-08-19 ASSESSMENT — ACTIVITIES OF DAILY LIVING (ADL)
ADLS_ACUITY_SCORE: 56

## 2025-08-19 ASSESSMENT — COLUMBIA-SUICIDE SEVERITY RATING SCALE - C-SSRS
6. HAVE YOU EVER DONE ANYTHING, STARTED TO DO ANYTHING, OR PREPARED TO DO ANYTHING TO END YOUR LIFE?: NO
1. IN THE PAST MONTH, HAVE YOU WISHED YOU WERE DEAD OR WISHED YOU COULD GO TO SLEEP AND NOT WAKE UP?: NO
2. HAVE YOU ACTUALLY HAD ANY THOUGHTS OF KILLING YOURSELF IN THE PAST MONTH?: NO

## 2025-08-27 ENCOUNTER — TELEPHONE (OUTPATIENT)
Dept: FAMILY MEDICINE | Facility: CLINIC | Age: 40
End: 2025-08-27
Payer: COMMERCIAL

## 2025-09-01 LAB
ABO + RH BLD: NORMAL
BLD GP AB SCN SERPL QL: NEGATIVE
SPECIMEN EXP DATE BLD: NORMAL

## 2025-09-02 ENCOUNTER — PRENATAL OFFICE VISIT (OUTPATIENT)
Dept: FAMILY MEDICINE | Facility: CLINIC | Age: 40
End: 2025-09-02
Payer: COMMERCIAL

## 2025-09-02 VITALS
TEMPERATURE: 97.2 F | OXYGEN SATURATION: 99 % | BODY MASS INDEX: 30.58 KG/M2 | WEIGHT: 201.75 LBS | RESPIRATION RATE: 16 BRPM | SYSTOLIC BLOOD PRESSURE: 111 MMHG | HEART RATE: 90 BPM | DIASTOLIC BLOOD PRESSURE: 73 MMHG | HEIGHT: 68 IN

## 2025-09-02 DIAGNOSIS — Z87.59 HISTORY OF POSTPARTUM HEMORRHAGE: ICD-10-CM

## 2025-09-02 DIAGNOSIS — J30.2 SEASONAL ALLERGIC RHINITIS, UNSPECIFIED TRIGGER: ICD-10-CM

## 2025-09-02 DIAGNOSIS — J30.89 SEASONAL ALLERGIC RHINITIS DUE TO OTHER ALLERGIC TRIGGER: ICD-10-CM

## 2025-09-02 DIAGNOSIS — O09.899 SHORT INTERVAL BETWEEN PREGNANCIES AFFECTING PREGNANCY, ANTEPARTUM: ICD-10-CM

## 2025-09-02 DIAGNOSIS — O09.529 ANTEPARTUM MULTIGRAVIDA OF ADVANCED MATERNAL AGE: Primary | ICD-10-CM

## 2025-09-02 LAB
ALBUMIN UR-MCNC: NEGATIVE MG/DL
APPEARANCE UR: ABNORMAL
BACTERIA #/AREA URNS HPF: ABNORMAL /HPF
BILIRUB UR QL STRIP: NEGATIVE
COLOR UR AUTO: YELLOW
ERYTHROCYTE [DISTWIDTH] IN BLOOD BY AUTOMATED COUNT: 13.8 % (ref 10–15)
GLUCOSE UR STRIP-MCNC: NEGATIVE MG/DL
HCT VFR BLD AUTO: 34.3 % (ref 35–47)
HGB BLD-MCNC: 11.8 G/DL (ref 11.7–15.7)
HGB UR QL STRIP: NEGATIVE
KETONES UR STRIP-MCNC: 40 MG/DL
LEUKOCYTE ESTERASE UR QL STRIP: ABNORMAL
MCH RBC QN AUTO: 29.4 PG (ref 26.5–33)
MCHC RBC AUTO-ENTMCNC: 34.4 G/DL (ref 31.5–36.5)
MCV RBC AUTO: 85.3 FL (ref 78–100)
MUCOUS THREADS #/AREA URNS LPF: PRESENT /LPF
NITRATE UR QL: NEGATIVE
PH UR STRIP: 5.5 [PH] (ref 5–7)
PLATELET # BLD AUTO: 212 10E3/UL (ref 150–450)
RBC # BLD AUTO: 4.02 10E6/UL (ref 3.8–5.2)
RBC #/AREA URNS AUTO: ABNORMAL /HPF
SP GR UR STRIP: 1.02 (ref 1–1.03)
SQUAMOUS #/AREA URNS AUTO: ABNORMAL /LPF
UROBILINOGEN UR STRIP-ACNC: 0.2 E.U./DL
WBC # BLD AUTO: 6.87 10E3/UL (ref 4–11)
WBC #/AREA URNS AUTO: ABNORMAL /HPF

## 2025-09-02 PROCEDURE — 86900 BLOOD TYPING SEROLOGIC ABO: CPT | Performed by: FAMILY MEDICINE

## 2025-09-02 PROCEDURE — 99000 SPECIMEN HANDLING OFFICE-LAB: CPT | Performed by: FAMILY MEDICINE

## 2025-09-02 PROCEDURE — 82728 ASSAY OF FERRITIN: CPT | Performed by: FAMILY MEDICINE

## 2025-09-02 PROCEDURE — 87086 URINE CULTURE/COLONY COUNT: CPT | Performed by: FAMILY MEDICINE

## 2025-09-02 PROCEDURE — 85027 COMPLETE CBC AUTOMATED: CPT | Performed by: FAMILY MEDICINE

## 2025-09-02 PROCEDURE — 86762 RUBELLA ANTIBODY: CPT | Performed by: FAMILY MEDICINE

## 2025-09-02 PROCEDURE — 87340 HEPATITIS B SURFACE AG IA: CPT | Performed by: FAMILY MEDICINE

## 2025-09-02 PROCEDURE — 36415 COLL VENOUS BLD VENIPUNCTURE: CPT | Performed by: FAMILY MEDICINE

## 2025-09-02 PROCEDURE — 83655 ASSAY OF LEAD: CPT | Mod: 90 | Performed by: FAMILY MEDICINE

## 2025-09-02 PROCEDURE — 81001 URINALYSIS AUTO W/SCOPE: CPT | Performed by: FAMILY MEDICINE

## 2025-09-02 PROCEDURE — 86901 BLOOD TYPING SEROLOGIC RH(D): CPT | Performed by: FAMILY MEDICINE

## 2025-09-02 PROCEDURE — 86780 TREPONEMA PALLIDUM: CPT | Performed by: FAMILY MEDICINE

## 2025-09-02 PROCEDURE — 86850 RBC ANTIBODY SCREEN: CPT | Performed by: FAMILY MEDICINE

## 2025-09-02 PROCEDURE — 87389 HIV-1 AG W/HIV-1&-2 AB AG IA: CPT | Performed by: FAMILY MEDICINE

## 2025-09-02 RX ORDER — FERROUS SULFATE 325(65) MG
325 TABLET ORAL
Qty: 90 TABLET | Refills: 2 | Status: SHIPPED | OUTPATIENT
Start: 2025-09-02

## 2025-09-02 RX ORDER — LORATADINE 10 MG/1
1 TABLET ORAL DAILY
Qty: 30 TABLET | Refills: 11 | Status: SHIPPED | OUTPATIENT
Start: 2025-09-02

## 2025-09-03 ENCOUNTER — TRANSCRIBE ORDERS (OUTPATIENT)
Dept: MATERNAL FETAL MEDICINE | Facility: HOSPITAL | Age: 40
End: 2025-09-03
Payer: COMMERCIAL

## 2025-09-03 DIAGNOSIS — O26.90 PREGNANCY RELATED CONDITION: Primary | ICD-10-CM

## 2025-09-03 LAB
FERRITIN SERPL-MCNC: 15 NG/ML (ref 6–175)
HBV SURFACE AG SERPL QL IA: NONREACTIVE
HIV 1+2 AB+HIV1 P24 AG SERPL QL IA: NONREACTIVE
RUBV IGG SERPL QL IA: 4.4 INDEX
RUBV IGG SERPL QL IA: POSITIVE
T PALLIDUM AB SER QL: NONREACTIVE

## 2025-09-04 LAB — BACTERIA UR CULT: NORMAL
